# Patient Record
Sex: FEMALE | Race: BLACK OR AFRICAN AMERICAN | NOT HISPANIC OR LATINO | Employment: UNEMPLOYED | RURAL
[De-identification: names, ages, dates, MRNs, and addresses within clinical notes are randomized per-mention and may not be internally consistent; named-entity substitution may affect disease eponyms.]

---

## 2017-02-20 ENCOUNTER — OFFICE VISIT (OUTPATIENT)
Dept: RETAIL CLINIC | Facility: CLINIC | Age: 57
End: 2017-02-20

## 2017-02-20 VITALS
DIASTOLIC BLOOD PRESSURE: 72 MMHG | TEMPERATURE: 96.9 F | HEART RATE: 82 BPM | SYSTOLIC BLOOD PRESSURE: 130 MMHG | HEIGHT: 63 IN | OXYGEN SATURATION: 97 % | BODY MASS INDEX: 24.1 KG/M2 | WEIGHT: 136 LBS

## 2017-02-20 DIAGNOSIS — S46.911A RIGHT SHOULDER STRAIN, INITIAL ENCOUNTER: Primary | ICD-10-CM

## 2017-02-20 PROCEDURE — 99213 OFFICE O/P EST LOW 20 MIN: CPT | Performed by: NURSE PRACTITIONER

## 2017-02-20 PROCEDURE — 96372 THER/PROPH/DIAG INJ SC/IM: CPT | Performed by: NURSE PRACTITIONER

## 2017-02-20 RX ORDER — METHYLPREDNISOLONE ACETATE 40 MG/ML
80 INJECTION, SUSPENSION INTRA-ARTICULAR; INTRALESIONAL; INTRAMUSCULAR; SOFT TISSUE ONCE
Status: COMPLETED | OUTPATIENT
Start: 2017-02-20 | End: 2017-02-20

## 2017-02-20 RX ORDER — NAPROXEN SODIUM 220 MG
220 TABLET ORAL 2 TIMES DAILY PRN
COMMUNITY
End: 2017-07-10

## 2017-02-20 RX ORDER — CYCLOBENZAPRINE HCL 10 MG
10 TABLET ORAL 3 TIMES DAILY PRN
Qty: 30 TABLET | Refills: 0 | Status: SHIPPED | OUTPATIENT
Start: 2017-02-20 | End: 2017-07-10

## 2017-02-20 RX ADMIN — METHYLPREDNISOLONE ACETATE 80 MG: 40 INJECTION, SUSPENSION INTRA-ARTICULAR; INTRALESIONAL; INTRAMUSCULAR; SOFT TISSUE at 16:19

## 2017-02-20 NOTE — PATIENT INSTRUCTIONS
Muscle Strain  A muscle strain is an injury that occurs when a muscle is stretched beyond its normal length. Usually a small number of muscle fibers are torn when this happens. Muscle strain is rated in degrees. First-degree strains have the least amount of muscle fiber tearing and pain. Second-degree and third-degree strains have increasingly more tearing and pain.   Usually, recovery from muscle strain takes 1-2 weeks. Complete healing takes 5-6 weeks.   CAUSES   Muscle strain happens when a sudden, violent force placed on a muscle stretches it too far. This may occur with lifting, sports, or a fall.   RISK FACTORS  Muscle strain is especially common in athletes.   SIGNS AND SYMPTOMS  At the site of the muscle strain, there may be:  · Pain.  · Bruising.  · Swelling.  · Difficulty using the muscle due to pain or lack of normal function.  DIAGNOSIS   Your health care provider will perform a physical exam and ask about your medical history.  TREATMENT   Often, the best treatment for a muscle strain is resting, icing, and applying cold compresses to the injured area.    HOME CARE INSTRUCTIONS   · Use the PRICE method of treatment to promote muscle healing during the first 2-3 days after your injury. The PRICE method involves:    Protecting the muscle from being injured again.    Restricting your activity and resting the injured body part.    Icing your injury. To do this, put ice in a plastic bag. Place a towel between your skin and the bag. Then, apply the ice and leave it on from 15-20 minutes each hour. After the third day, switch to moist heat packs.    Apply compression to the injured area with a splint or elastic bandage. Be careful not to wrap it too tightly. This may interfere with blood circulation or increase swelling.    Elevate the injured body part above the level of your heart as often as you can.  · Only take over-the-counter or prescription medicines for pain, discomfort, or fever as directed by your  health care provider.  · Warming up prior to exercise helps to prevent future muscle strains.  SEEK MEDICAL CARE IF:   · You have increasing pain or swelling in the injured area.  · You have numbness, tingling, or a significant loss of strength in the injured area.  MAKE SURE YOU:   · Understand these instructions.  · Will watch your condition.  · Will get help right away if you are not doing well or get worse.     This information is not intended to replace advice given to you by your health care provider. Make sure you discuss any questions you have with your health care provider.     Document Released: 12/18/2006 Document Revised: 10/08/2014 Document Reviewed: 07/17/2014  [employer] Interactive Patient Education ©2016 [employer]    Shoulder Pain  Many things can cause shoulder pain, including:  · An injury to the area.  · Overuse of the shoulder.  · Arthritis.  The source of the pain can be:  · Inflammation.  · An injury to the shoulder joint.  · An injury to a tendon, ligament, or bone.  HOME CARE INSTRUCTIONS  Take these actions to help with your pain:  · Squeeze a soft ball or a foam pad as much as possible. This helps to keep the shoulder from swelling. It also helps to strengthen the arm.  · Take over-the-counter and prescription medicines only as told by your health care provider.  · If directed, apply ice to the area:    Put ice in a plastic bag.    Place a towel between your skin and the bag.    Leave the ice on for 20 minutes, 2-3 times per day. Stop applying ice if it does not help with the pain.  · If you were given a shoulder sling or immobilizer:    Wear it as told.    Remove it to shower or bathe.    Move your arm as little as possible, but keep your hand moving to prevent swelling.  SEEK MEDICAL CARE IF:  · Your pain gets worse.  · Your pain is not relieved with medicines.  · New pain develops in your arm, hand, or fingers.  SEEK IMMEDIATE MEDICAL CARE IF:  · Your arm, hand, or fingers:     Tingle.    Become numb.    Become swollen.    Become painful.    Turn white or blue.     This information is not intended to replace advice given to you by your health care provider. Make sure you discuss any questions you have with your health care provider.    Return to see your Primary Care Provider if not improved in 1 week.       Document Released: 09/27/2006 Document Revised: 11/28/2016 Document Reviewed: 04/11/2016  [employer] Interactive Patient Education ©2016 [employer] Inc.

## 2017-02-20 NOTE — PROGRESS NOTES
"Subjective   Jo Ann Lacy is a 56 y.o. female.     HPI Comments: Patient presents to clinic with Right shoulder pain, worsened by the repetitive motion she performs at work.  On questioning whether the patient had reported this to her employer, she says not yet \"because I've seen the way they treat the other employees around there when they go and report things like wrist pain.  A lot of them just get ignored. Or they put them on restrictions briefly but usually they end up doing their same job anyway.\"  Patient has been using aleve that helps a little.  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    Upper Extremity Issue   This is a new problem. The current episode started 1 to 4 weeks ago. The problem occurs constantly. The problem has been gradually worsening. Associated symptoms include chest pain, myalgias and weakness. Pertinent negatives include no joint swelling, neck pain or numbness. Associated symptoms comments: RUE weakness. Exacerbated by: Repetitive activity performed at work. She has tried NSAIDs (pain patches) for the symptoms. The treatment provided mild relief.        The following portions of the patient's history were reviewed and updated as appropriate: allergies, current medications, past family history, past medical history, past social history, past surgical history and problem list.    Review of Systems   Cardiovascular: Positive for chest pain.        Right upper anterior chest wall pain   Musculoskeletal: Positive for myalgias. Negative for joint swelling, neck pain and neck stiffness.   Neurological: Positive for weakness. Negative for numbness.        Extremity weakness      No Known Allergies      Objective      Visit Vitals   • /72 (BP Location: Left arm, Patient Position: Sitting, Cuff Size: Adult)   • Pulse 82   • Temp 96.9 °F (36.1 °C) (Tympanic)   • Ht 63\" (160 cm)   • Wt 136 lb (61.7 kg)   • SpO2 " 97%   • BMI 24.09 kg/m2       Physical Exam   Constitutional: She is oriented to person, place, and time. She appears well-developed.   Eyes: Conjunctivae and EOM are normal. Pupils are equal, round, and reactive to light.   Neck: Normal range of motion. Neck supple.   Cardiovascular: Normal rate, regular rhythm, normal heart sounds and intact distal pulses.  Exam reveals no gallop and no friction rub.    No murmur heard.  Pulmonary/Chest: Effort normal and breath sounds normal. No respiratory distress. She has no wheezes. She has no rales. She exhibits tenderness. She exhibits no mass, no laceration, no edema, no deformity, no swelling and no retraction.       Musculoskeletal:        Right shoulder: She exhibits tenderness, pain and decreased strength. She exhibits no effusion, no crepitus and normal pulse.   Pain elicited with most provocative testing for RTC   Neurological: She is alert and oriented to person, place, and time. She has normal reflexes. She displays no atrophy and no tremor. No cranial nerve deficit or sensory deficit. She exhibits normal muscle tone. Coordination normal.   RUE strength <LUE       Assessment/Plan   Jo Ann was seen today for shoulder pain.    Diagnoses and all orders for this visit:    Right shoulder strain, initial encounter  -     methylPREDNISolone acetate (DEPO-medrol) injection 80 mg; Inject 2 mL into the shoulder, thigh, or buttocks 1 (One) Time.    Other orders  -     diclofenac (VOLTAREN) 1 % gel gel; Apply 4 g topically 4 (Four) Times a Day As Needed (shoulder pain).  -     cyclobenzaprine (FLEXERIL) 10 MG tablet; Take 1 tablet by mouth 3 (Three) Times a Day As Needed for muscle spasms (DO NOT USE WHILE AT WORK).      SEEK IMMEDIATE MEDICAL CARE IF:  · Your arm, hand, or fingers:    Tingle.    Become numb.    Become swollen.    Become painful.    Turn white or blue.    Discussed the importance of further evaluation by a higher level of care.  Dulling pain with medications  can potentiate the risk for additional injury. I also encouraged her to further discuss her complaint with her employer.      RODNEY Oviedo

## 2017-06-15 RX ORDER — AMLODIPINE BESYLATE 5 MG/1
TABLET ORAL
Qty: 30 TABLET | Refills: 0 | OUTPATIENT
Start: 2017-06-15

## 2017-06-19 RX ORDER — AMLODIPINE BESYLATE 5 MG/1
TABLET ORAL
Qty: 30 TABLET | Refills: 0 | Status: SHIPPED | OUTPATIENT
Start: 2017-06-19 | End: 2017-07-10 | Stop reason: SDUPTHER

## 2017-07-10 ENCOUNTER — OFFICE VISIT (OUTPATIENT)
Dept: FAMILY MEDICINE CLINIC | Facility: CLINIC | Age: 57
End: 2017-07-10

## 2017-07-10 VITALS
WEIGHT: 130.9 LBS | RESPIRATION RATE: 20 BRPM | HEART RATE: 79 BPM | SYSTOLIC BLOOD PRESSURE: 128 MMHG | BODY MASS INDEX: 23.2 KG/M2 | TEMPERATURE: 98.7 F | HEIGHT: 63 IN | OXYGEN SATURATION: 97 % | DIASTOLIC BLOOD PRESSURE: 80 MMHG

## 2017-07-10 DIAGNOSIS — K63.5 BENIGN POLYP OF LARGE INTESTINE: Chronic | ICD-10-CM

## 2017-07-10 DIAGNOSIS — D37.4 VILLOUS ADENOMA OF COLON: Chronic | ICD-10-CM

## 2017-07-10 DIAGNOSIS — Z13.29 SCREENING FOR THYROID DISORDER: ICD-10-CM

## 2017-07-10 DIAGNOSIS — I10 ESSENTIAL HYPERTENSION: Primary | ICD-10-CM

## 2017-07-10 DIAGNOSIS — Z11.59 NEED FOR HEPATITIS C SCREENING TEST: ICD-10-CM

## 2017-07-10 DIAGNOSIS — Z13.220 SCREENING FOR LIPOID DISORDERS: ICD-10-CM

## 2017-07-10 DIAGNOSIS — D12.6 TUBULAR ADENOMA OF COLON: Chronic | ICD-10-CM

## 2017-07-10 DIAGNOSIS — Z12.31 ENCOUNTER FOR SCREENING MAMMOGRAM FOR BREAST CANCER: ICD-10-CM

## 2017-07-10 DIAGNOSIS — Z13.820 SCREENING FOR OSTEOPOROSIS: ICD-10-CM

## 2017-07-10 DIAGNOSIS — Z13.1 SCREENING FOR DIABETES MELLITUS: ICD-10-CM

## 2017-07-10 LAB
25(OH)D3 SERPL-MCNC: 33.9 NG/ML (ref 30–100)
ALBUMIN SERPL-MCNC: 4.2 G/DL (ref 3.4–4.8)
ALBUMIN UR-MCNC: 0.6 MG/L
ALBUMIN/GLOB SERPL: 1.4 G/DL (ref 1.1–1.8)
ALP SERPL-CCNC: 71 U/L (ref 38–126)
ALT SERPL W P-5'-P-CCNC: 29 U/L (ref 9–52)
ANION GAP SERPL CALCULATED.3IONS-SCNC: 10 MMOL/L (ref 5–15)
ARTICHOKE IGE QN: 80 MG/DL (ref 1–129)
AST SERPL-CCNC: 17 U/L (ref 14–36)
BILIRUB SERPL-MCNC: 0.2 MG/DL (ref 0.2–1.3)
BUN BLD-MCNC: 18 MG/DL (ref 7–21)
BUN/CREAT SERPL: 20.7 (ref 7–25)
CALCIUM SPEC-SCNC: 9.5 MG/DL (ref 8.4–10.2)
CHLORIDE SERPL-SCNC: 105 MMOL/L (ref 95–110)
CHOLEST SERPL-MCNC: 189 MG/DL (ref 0–199)
CO2 SERPL-SCNC: 24 MMOL/L (ref 22–31)
CREAT BLD-MCNC: 0.87 MG/DL (ref 0.5–1)
CREAT UR-MCNC: 59.5 MG/DL
DEPRECATED RDW RBC AUTO: 47.2 FL (ref 36.4–46.3)
ERYTHROCYTE [DISTWIDTH] IN BLOOD BY AUTOMATED COUNT: 13.3 % (ref 11.5–14.5)
GFR SERPL CREATININE-BSD FRML MDRD: 81 ML/MIN/1.73 (ref 51–120)
GLOBULIN UR ELPH-MCNC: 3 GM/DL (ref 2.3–3.5)
GLUCOSE BLD-MCNC: 91 MG/DL (ref 60–100)
HBA1C MFR BLD: 5.94 % (ref 4–5.6)
HCT VFR BLD AUTO: 37.4 % (ref 35–45)
HDLC SERPL-MCNC: 48 MG/DL (ref 60–200)
HGB BLD-MCNC: 12.3 G/DL (ref 12–15.5)
LDLC/HDLC SERPL: 1.52 {RATIO} (ref 0–3.22)
MCH RBC QN AUTO: 31.9 PG (ref 26.5–34)
MCHC RBC AUTO-ENTMCNC: 32.9 G/DL (ref 31.4–36)
MCV RBC AUTO: 96.9 FL (ref 80–98)
MICROALBUMIN/CREAT UR: 10.1 MG/G (ref 0–30)
PLATELET # BLD AUTO: 302 10*3/MM3 (ref 150–450)
PMV BLD AUTO: 10.1 FL (ref 8–12)
POTASSIUM BLD-SCNC: 4.3 MMOL/L (ref 3.5–5.1)
PROT SERPL-MCNC: 7.2 G/DL (ref 6.3–8.6)
RBC # BLD AUTO: 3.86 10*6/MM3 (ref 3.77–5.16)
SODIUM BLD-SCNC: 139 MMOL/L (ref 137–145)
TRIGL SERPL-MCNC: 340 MG/DL (ref 20–199)
TSH SERPL DL<=0.05 MIU/L-ACNC: 1.05 MIU/ML (ref 0.46–4.68)
WBC NRBC COR # BLD: 6.36 10*3/MM3 (ref 3.2–9.8)

## 2017-07-10 PROCEDURE — 80061 LIPID PANEL: CPT | Performed by: NURSE PRACTITIONER

## 2017-07-10 PROCEDURE — 82043 UR ALBUMIN QUANTITATIVE: CPT | Performed by: NURSE PRACTITIONER

## 2017-07-10 PROCEDURE — 80074 ACUTE HEPATITIS PANEL: CPT | Performed by: NURSE PRACTITIONER

## 2017-07-10 PROCEDURE — 99214 OFFICE O/P EST MOD 30 MIN: CPT | Performed by: NURSE PRACTITIONER

## 2017-07-10 PROCEDURE — 84443 ASSAY THYROID STIM HORMONE: CPT | Performed by: NURSE PRACTITIONER

## 2017-07-10 PROCEDURE — 82306 VITAMIN D 25 HYDROXY: CPT | Performed by: NURSE PRACTITIONER

## 2017-07-10 PROCEDURE — 83036 HEMOGLOBIN GLYCOSYLATED A1C: CPT | Performed by: NURSE PRACTITIONER

## 2017-07-10 PROCEDURE — 80053 COMPREHEN METABOLIC PANEL: CPT | Performed by: NURSE PRACTITIONER

## 2017-07-10 PROCEDURE — 82570 ASSAY OF URINE CREATININE: CPT | Performed by: NURSE PRACTITIONER

## 2017-07-10 PROCEDURE — 85027 COMPLETE CBC AUTOMATED: CPT | Performed by: NURSE PRACTITIONER

## 2017-07-10 RX ORDER — AMLODIPINE BESYLATE 5 MG/1
5 TABLET ORAL DAILY
Qty: 90 TABLET | Refills: 3 | Status: SHIPPED | OUTPATIENT
Start: 2017-07-10 | End: 2017-08-04 | Stop reason: SDUPTHER

## 2017-07-10 NOTE — PATIENT INSTRUCTIONS
Preventive Care 40-64 Years, Female  Preventive care refers to lifestyle choices and visits with your health care provider that can promote health and wellness.  WHAT DOES PREVENTIVE CARE INCLUDE?  · A yearly physical exam. This is also called an annual well check.  · Dental exams once or twice a year.  · Routine eye exams. Ask your health care provider how often you should have your eyes checked.  · Personal lifestyle choices, including:    Daily care of your teeth and gums.    Regular physical activity.    Eating a healthy diet.    Avoiding tobacco and drug use.    Limiting alcohol use.    Practicing safe sex.    Taking low-dose aspirin daily starting at age 50.    Taking vitamin and mineral supplements as recommended by your health care provider.  WHAT HAPPENS DURING AN ANNUAL WELL CHECK?  The services and screenings done by your health care provider during your annual well check will depend on your age, overall health, lifestyle risk factors, and family history of disease.  Counseling  Your health care provider may ask you questions about your:  · Alcohol use.  · Tobacco use.  · Drug use.  · Emotional well-being.  · Home and relationship well-being.  · Sexual activity.  · Eating habits.  · Work and work environment.  · Method of birth control.  · Menstrual cycle.  · Pregnancy history.  Screening  You may have the following tests or measurements:  · Height, weight, and BMI.  · Blood pressure.  · Lipid and cholesterol levels. These may be checked every 5 years, or more frequently if you are over 50 years old.  · Skin check.  · Lung cancer screening. You may have this screening every year starting at age 55 if you have a 30-pack-year history of smoking and currently smoke or have quit within the past 15 years.  · Fecal occult blood test (FOBT) of the stool. You may have this test every year starting at age 50.  · Flexible sigmoidoscopy or colonoscopy. You may have a sigmoidoscopy every 5 years or a colonoscopy  every 10 years starting at age 50.  · Hepatitis C blood test.  · Hepatitis B blood test.  · Sexually transmitted disease (STD) testing.  · Diabetes screening. This is done by checking your blood sugar (glucose) after you have not eaten for a while (fasting). You may have this done every 1-3 years.  · Mammogram. This may be done every 1-2 years. Talk to your health care provider about when you should start having regular mammograms. This may depend on whether you have a family history of breast cancer.  · BRCA-related cancer screening. This may be done if you have a family history of breast, ovarian, tubal, or peritoneal cancers.  · Pelvic exam and Pap test. This may be done every 3 years starting at age 21. Starting at age 30, this may be done every 5 years if you have a Pap test in combination with an HPV test.  · Bone density scan. This is done to screen for osteoporosis. You may have this scan if you are at high risk for osteoporosis.  Discuss your test results, treatment options, and if necessary, the need for more tests with your health care provider.  Vaccines   Your health care provider may recommend certain vaccines, such as:  · Influenza vaccine. This is recommended every year.  · Tetanus, diphtheria, and acellular pertussis (Tdap, Td) vaccine. You may need a Td booster every 10 years.  · Varicella vaccine. You may need this if you have not been vaccinated.  · Zoster vaccine. You may need this after age 60.  · Measles, mumps, and rubella (MMR) vaccine. You may need at least one dose of MMR if you were born in 1957 or later. You may also need a second dose.  · Pneumococcal 13-valent conjugate (PCV13) vaccine. You may need this if you have certain conditions and were not previously vaccinated.  · Pneumococcal polysaccharide (PPSV23) vaccine. You may need one or two doses if you smoke cigarettes or if you have certain conditions.  · Meningococcal vaccine. You may need this if you have certain  conditions.  · Hepatitis A vaccine. You may need this if you have certain conditions or if you travel or work in places where you may be exposed to hepatitis A.  · Hepatitis B vaccine. You may need this if you have certain conditions or if you travel or work in places where you may be exposed to hepatitis B.  · Haemophilus influenzae type b (Hib) vaccine. You may need this if you have certain conditions.  Talk to your health care provider about which screenings and vaccines you need and how often you need them.     This information is not intended to replace advice given to you by your health care provider. Make sure you discuss any questions you have with your health care provider.     Document Released: 01/13/2017 Document Reviewed: 01/13/2017  [employer] Interactive Patient Education ©2017 [employer] Inc.    Hypertension  Hypertension is another name for high blood pressure. High blood pressure forces your heart to work harder to pump blood. A blood pressure reading has two numbers, which includes a higher number over a lower number (example: 110/72).  HOME CARE   · Have your blood pressure rechecked by your doctor.  · Only take medicine as told by your doctor. Follow the directions carefully. The medicine does not work as well if you skip doses. Skipping doses also puts you at risk for problems.  · Do not smoke.  · Monitor your blood pressure at home as told by your doctor.  GET HELP IF:  · You think you are having a reaction to the medicine you are taking.  · You have repeat headaches or feel dizzy.  · You have puffiness (swelling) in your ankles.  · You have trouble with your vision.  GET HELP RIGHT AWAY IF:   · You get a very bad headache and are confused.  · You feel weak, numb, or faint.  · You get chest or belly (abdominal) pain.  · You throw up (vomit).  · You cannot breathe very well.  MAKE SURE YOU:   · Understand these instructions.  · Will watch your condition.  · Will get help right away if you are not  doing well or get worse.     This information is not intended to replace advice given to you by your health care provider. Make sure you discuss any questions you have with your health care provider.     Document Released: 06/05/2009 Document Revised: 12/23/2014 Document Reviewed: 10/10/2014  [employer] Interactive Patient Education ©2017 [employer]    Steps to Quit Smoking   Smoking tobacco can be harmful to your health and can affect almost every organ in your body. Smoking puts you, and those around you, at risk for developing many serious chronic diseases. Quitting smoking is difficult, but it is one of the best things that you can do for your health. It is never too late to quit.  WHAT ARE THE BENEFITS OF QUITTING SMOKING?  When you quit smoking, you lower your risk of developing serious diseases and conditions, such as:  · Lung cancer or lung disease, such as COPD.  · Heart disease.  · Stroke.  · Heart attack.  · Infertility.  · Osteoporosis and bone fractures.  Additionally, symptoms such as coughing, wheezing, and shortness of breath may get better when you quit. You may also find that you get sick less often because your body is stronger at fighting off colds and infections. If you are pregnant, quitting smoking can help to reduce your chances of having a baby of low birth weight.  HOW DO I GET READY TO QUIT?  When you decide to quit smoking, create a plan to make sure that you are successful. Before you quit:  · Pick a date to quit. Set a date within the next two weeks to give you time to prepare.  · Write down the reasons why you are quitting. Keep this list in places where you will see it often, such as on your bathroom mirror or in your car or wallet.  · Identify the people, places, things, and activities that make you want to smoke (triggers) and avoid them. Make sure to take these actions:    Throw away all cigarettes at home, at work, and in your car.    Throw away smoking accessories, such as  "ashtrays and lighters.    Clean your car and make sure to empty the ashtray.    Clean your home, including curtains and carpets.  · Tell your family, friends, and coworkers that you are quitting. Support from your loved ones can make quitting easier.  · Talk with your health care provider about your options for quitting smoking.  · Find out what treatment options are covered by your health insurance.  WHAT STRATEGIES CAN I USE TO QUIT SMOKING?   Talk with your healthcare provider about different strategies to quit smoking. Some strategies include:  · Quitting smoking altogether instead of gradually lessening how much you smoke over a period of time. Research shows that quitting \"cold turkey\" is more successful than gradually quitting.  · Attending in-person counseling to help you build problem-solving skills. You are more likely to have success in quitting if you attend several counseling sessions. Even short sessions of 10 minutes can be effective.  · Finding resources and support systems that can help you to quit smoking and remain smoke-free after you quit. These resources are most helpful when you use them often. They can include:    Online chats with a counselor.    Telephone quitlines.    Printed self-help materials.    Support groups or group counseling.    Text messaging programs.    Mobile phone applications.  · Taking medicines to help you quit smoking. (If you are pregnant or breastfeeding, talk with your health care provider first.) Some medicines contain nicotine and some do not. Both types of medicines help with cravings, but the medicines that include nicotine help to relieve withdrawal symptoms. Your health care provider may recommend:    Nicotine patches, gum, or lozenges.    Nicotine inhalers or sprays.    Non-nicotine medicine that is taken by mouth.  Talk with your health care provider about combining strategies, such as taking medicines while you are also receiving in-person counseling. Using " these two strategies together makes you more likely to succeed in quitting than if you used either strategy on its own.  If you are pregnant or breastfeeding, talk with your health care provider about finding counseling or other support strategies to quit smoking. Do not take medicine to help you quit smoking unless told to do so by your health care provider.  WHAT THINGS CAN I DO TO MAKE IT EASIER TO QUIT?  Quitting smoking might feel overwhelming at first, but there is a lot that you can do to make it easier. Take these important actions:  · Reach out to your family and friends and ask that they support and encourage you during this time. Call telephone quitlines, reach out to support groups, or work with a counselor for support.  · Ask people who smoke to avoid smoking around you.  · Avoid places that trigger you to smoke, such as bars, parties, or smoke-break areas at work.  · Spend time around people who do not smoke.  · Lessen stress in your life, because stress can be a smoking trigger for some people. To lessen stress, try:    Exercising regularly.    Deep-breathing exercises.    Yoga.    Meditating.    Performing a body scan. This involves closing your eyes, scanning your body from head to toe, and noticing which parts of your body are particularly tense. Purposefully relax the muscles in those areas.  · Download or purchase mobile phone or tablet apps (applications) that can help you stick to your quit plan by providing reminders, tips, and encouragement. There are many free apps, such as QuitGuide from the CDC (Centers for Disease Control and Prevention). You can find other support for quitting smoking (smoking cessation) through smokefree.gov and other websites.  HOW WILL I FEEL WHEN I QUIT SMOKING?  Within the first 24 hours of quitting smoking, you may start to feel some withdrawal symptoms. These symptoms are usually most noticeable 2-3 days after quitting, but they usually do not last beyond 2-3  weeks. Changes or symptoms that you might experience include:  · Mood swings.  · Restlessness, anxiety, or irritation.  · Difficulty concentrating.  · Dizziness.  · Strong cravings for sugary foods in addition to nicotine.  · Mild weight gain.  · Constipation.  · Nausea.  · Coughing or a sore throat.  · Changes in how your medicines work in your body.  · A depressed mood.  · Difficulty sleeping (insomnia).  After the first 2-3 weeks of quitting, you may start to notice more positive results, such as:  · Improved sense of smell and taste.  · Decreased coughing and sore throat.  · Slower heart rate.  · Lower blood pressure.  · Clearer skin.  · The ability to breathe more easily.  · Fewer sick days.  Quitting smoking is very challenging for most people. Do not get discouraged if you are not successful the first time. Some people need to make many attempts to quit before they achieve long-term success. Do your best to stick to your quit plan, and talk with your health care provider if you have any questions or concerns.     This information is not intended to replace advice given to you by your health care provider. Make sure you discuss any questions you have with your health care provider.     Document Released: 12/12/2002 Document Revised: 05/03/2016 Document Reviewed: 05/03/2016  [employer] Interactive Patient Education ©2017 [employer]

## 2017-07-10 NOTE — PROGRESS NOTES
Subjective   Jo Ann Lacy is a 56 y.o. female. She presents today for her routine follow up on chronic medical problems.  She has been doing fairly well since her last visit with me.  Hx of HTN that is well controlled on Norvasc.  She also is due for fasting labs and a repeat colonoscopy.  Previously saw Dr. Muñiz and Vaibhav Olsen, but does not have transportation to travel to [place]   She is also due for her screening mammogram and DXA scan.  Has had a total hysterectomy.    Hypertension   This is a chronic problem. The current episode started more than 1 year ago. The problem has been resolved since onset. The problem is controlled. Pertinent negatives include no anxiety, blurred vision, chest pain, headaches, malaise/fatigue, neck pain, orthopnea, palpitations, peripheral edema, PND, shortness of breath or sweats. There are no associated agents to hypertension. Risk factors for coronary artery disease include post-menopausal state, family history, sedentary lifestyle, smoking/tobacco exposure and stress. Past treatments include calcium channel blockers. The current treatment provides significant improvement. Compliance problems include diet and exercise.         The following portions of the patient's history were reviewed and updated as appropriate: allergies, current medications, past family history, past medical history, past social history, past surgical history and problem list.    Review of Systems   Constitutional: Negative.  Negative for malaise/fatigue.   Eyes: Negative for blurred vision.   Respiratory: Negative.  Negative for shortness of breath.    Cardiovascular: Negative.  Negative for chest pain, palpitations, orthopnea and PND.   Musculoskeletal: Negative for neck pain.   Skin: Negative.    Neurological: Negative for headaches.       Objective   Physical Exam   Constitutional: She is oriented to person, place, and time. Vital signs are normal. She appears well-developed and  well-nourished. No distress.   HENT:   Head: Normocephalic.   Right Ear: External ear normal.   Left Ear: External ear normal.   Nose: Nose normal.   Mouth/Throat: Oropharynx is clear and moist.   Eyes: EOM are normal. Pupils are equal, round, and reactive to light.   Neck: Normal range of motion. Neck supple. No JVD present. No thyromegaly present.   Cardiovascular: Normal rate and regular rhythm.  Exam reveals no gallop and no friction rub.    No murmur heard.  Pulmonary/Chest: Effort normal and breath sounds normal. No respiratory distress. She has no wheezes. She has no rales.   Musculoskeletal: Normal range of motion.   Neurological: She is alert and oriented to person, place, and time.   Skin: Skin is warm and dry. No rash noted. She is not diaphoretic. No erythema. No pallor.   Psychiatric: She has a normal mood and affect. Her behavior is normal. Judgment and thought content normal.   Nursing note and vitals reviewed.      Assessment/Plan   Jo Ann was seen today for follow-up and hypertension.    Diagnoses and all orders for this visit:    Essential hypertension  -     amLODIPine (NORVASC) 5 MG tablet; Take 1 tablet by mouth Daily.  -     CBC (No Diff)  -     Comprehensive Metabolic Panel  -     Microalbumin / Creatinine Urine Ratio  -     Vitamin D 25 Hydroxy    Screening for diabetes mellitus  -     Hemoglobin A1c    Screening for lipoid disorders  -     Lipid Panel    Screening for thyroid disorder  -     TSH    Encounter for screening mammogram for breast cancer  -     Mammo Screening Bilateral With CAD    Screening for osteoporosis  -     DEXA Bone Density Axial    Need for hepatitis C screening test  -     Hepatitis Panel, Acute    Fasting labs.   Schedule screening mammo and DXA.  Referral to Dr. Brewer at patient's request for repeat colonoscopy and GI care due to lack of transportation.   Continue current medications.  Follow up in 3 months for routine follow up.  Follow up sooner for  problems/concerns.  Patient verbalized understanding and agreement with plan of care.        This document has been electronically signed by RODNEY David on July 10, 2017 12:19 PM

## 2017-07-11 DIAGNOSIS — E78.1 HYPERTRIGLYCERIDEMIA: Primary | ICD-10-CM

## 2017-07-11 RX ORDER — FENOFIBRATE 160 MG/1
160 TABLET ORAL DAILY
Qty: 90 TABLET | Refills: 1 | Status: SHIPPED | OUTPATIENT
Start: 2017-07-11 | End: 2018-02-14 | Stop reason: SDUPTHER

## 2017-07-12 LAB
HAV IGM SERPL QL IA: NEGATIVE
HBV CORE IGM SERPL QL IA: NEGATIVE
HBV SURFACE AG SERPL QL IA: NEGATIVE
HCV AB SER DONR QL: NEGATIVE

## 2017-08-04 DIAGNOSIS — I10 ESSENTIAL HYPERTENSION: ICD-10-CM

## 2017-08-04 RX ORDER — AMLODIPINE BESYLATE 5 MG/1
5 TABLET ORAL DAILY
Qty: 90 TABLET | Refills: 3 | Status: SHIPPED | OUTPATIENT
Start: 2017-08-04 | End: 2018-02-14 | Stop reason: SDUPTHER

## 2018-02-14 ENCOUNTER — OFFICE VISIT (OUTPATIENT)
Dept: FAMILY MEDICINE CLINIC | Facility: CLINIC | Age: 58
End: 2018-02-14

## 2018-02-14 VITALS
RESPIRATION RATE: 20 BRPM | HEIGHT: 63 IN | OXYGEN SATURATION: 97 % | DIASTOLIC BLOOD PRESSURE: 80 MMHG | HEART RATE: 94 BPM | BODY MASS INDEX: 24.73 KG/M2 | TEMPERATURE: 98.4 F | SYSTOLIC BLOOD PRESSURE: 118 MMHG | WEIGHT: 139.6 LBS

## 2018-02-14 DIAGNOSIS — Z13.820 SCREENING FOR OSTEOPOROSIS: ICD-10-CM

## 2018-02-14 DIAGNOSIS — K63.5 BENIGN POLYP OF LARGE INTESTINE: Chronic | ICD-10-CM

## 2018-02-14 DIAGNOSIS — D37.4 VILLOUS ADENOMA OF COLON: Chronic | ICD-10-CM

## 2018-02-14 DIAGNOSIS — I10 ESSENTIAL HYPERTENSION: Primary | Chronic | ICD-10-CM

## 2018-02-14 DIAGNOSIS — D12.6 TUBULAR ADENOMA OF COLON: Chronic | ICD-10-CM

## 2018-02-14 DIAGNOSIS — E55.9 VITAMIN D DEFICIENCY: Chronic | ICD-10-CM

## 2018-02-14 DIAGNOSIS — Z12.31 ENCOUNTER FOR SCREENING MAMMOGRAM FOR BREAST CANCER: ICD-10-CM

## 2018-02-14 DIAGNOSIS — Z13.1 SCREENING FOR DIABETES MELLITUS: ICD-10-CM

## 2018-02-14 DIAGNOSIS — E78.1 HYPERTRIGLYCERIDEMIA: Chronic | ICD-10-CM

## 2018-02-14 DIAGNOSIS — Z13.29 SCREENING FOR THYROID DISORDER: ICD-10-CM

## 2018-02-14 PROCEDURE — 99213 OFFICE O/P EST LOW 20 MIN: CPT | Performed by: NURSE PRACTITIONER

## 2018-02-14 RX ORDER — FENOFIBRATE 160 MG/1
160 TABLET ORAL DAILY
Qty: 90 TABLET | Refills: 3 | Status: SHIPPED | OUTPATIENT
Start: 2018-02-14 | End: 2018-08-14 | Stop reason: SDDI

## 2018-02-14 RX ORDER — INFLUENZA A VIRUS A/MICHIGAN/45/2015 X-275 (H1N1) ANTIGEN (FORMALDEHYDE INACTIVATED), INFLUENZA A VIRUS A/SINGAPORE/INFIMH-16-0019/2016 IVR-186 (H3N2) ANTIGEN (FORMALDEHYDE INACTIVATED), INFLUENZA B VIRUS B/PHUKET/3073/2013 ANTIGEN (FORMALDEHYDE INACTIVATED), AND INFLUENZA B VIRUS B/MARYLAND/15/2016 BX-69A ANTIGEN (FORMALDEHYDE INACTIVATED) 15; 15; 15; 15 UG/.5ML; UG/.5ML; UG/.5ML; UG/.5ML
INJECTION, SUSPENSION INTRAMUSCULAR
COMMUNITY
Start: 2017-12-30 | End: 2018-02-14

## 2018-02-14 RX ORDER — AMLODIPINE BESYLATE 5 MG/1
5 TABLET ORAL DAILY
Qty: 90 TABLET | Refills: 3 | Status: SHIPPED | OUTPATIENT
Start: 2018-02-14 | End: 2018-08-14 | Stop reason: SDUPTHER

## 2018-02-14 RX ORDER — ISONIAZID 300 MG/1
TABLET ORAL
COMMUNITY
Start: 2018-01-17 | End: 2018-02-14

## 2018-02-14 NOTE — PROGRESS NOTES
Subjective   Jo Ann Lacy is a 57 y.o. female. She presents today for her routine follow up on chronic medical problems.  She is due for fasting labs, mammogram, and DXA scan.  No new complaints today in the office.    Hypertension   This is a chronic problem. The current episode started more than 1 year ago. The problem has been resolved since onset. The problem is controlled. Pertinent negatives include no anxiety, blurred vision, chest pain, headaches, malaise/fatigue, neck pain, orthopnea, palpitations, peripheral edema, PND, shortness of breath or sweats. There are no associated agents to hypertension. Risk factors for coronary artery disease include post-menopausal state, dyslipidemia, family history, sedentary lifestyle, smoking/tobacco exposure and stress. Past treatments include calcium channel blockers. The current treatment provides significant improvement. Compliance problems include diet and exercise.         The following portions of the patient's history were reviewed and updated as appropriate: allergies, current medications, past family history, past medical history, past social history, past surgical history and problem list.    Review of Systems   Constitutional: Negative.  Negative for malaise/fatigue.   Eyes: Negative for blurred vision.   Respiratory: Negative.  Negative for shortness of breath.    Cardiovascular: Negative.  Negative for chest pain, palpitations, orthopnea and PND.   Musculoskeletal: Negative for neck pain.   Skin: Negative.    Neurological: Negative for headaches.       Objective   Physical Exam   Constitutional: She is oriented to person, place, and time. Vital signs are normal. She appears well-developed and well-nourished. No distress.   HENT:   Head: Normocephalic.   Right Ear: External ear normal.   Left Ear: External ear normal.   Nose: Nose normal.   Mouth/Throat: Oropharynx is clear and moist.   Eyes: EOM are normal. Pupils are equal, round, and reactive to light.    Neck: Normal range of motion. Neck supple. No JVD present. No thyromegaly present.   Cardiovascular: Normal rate and regular rhythm.  Exam reveals no gallop and no friction rub.    No murmur heard.  Pulmonary/Chest: Effort normal and breath sounds normal. No respiratory distress. She has no wheezes. She has no rales.   Musculoskeletal: Normal range of motion.   Neurological: She is alert and oriented to person, place, and time.   Skin: Skin is warm and dry. No rash noted. She is not diaphoretic. No erythema. No pallor.   Psychiatric: She has a normal mood and affect. Her behavior is normal. Judgment and thought content normal.   Nursing note and vitals reviewed.      Assessment/Plan   Jo Ann was seen today for follow-up and hypertension.    Diagnoses and all orders for this visit:    Essential hypertension  -     CBC (No Diff)  -     Comprehensive Metabolic Panel  -     Microalbumin / Creatinine Urine Ratio - Urine, Clean Catch  -     amLODIPine (NORVASC) 5 MG tablet; Take 1 tablet by mouth Daily.    Hypertriglyceridemia  -     Lipid Panel  -     fenofibrate 160 MG tablet; Take 1 tablet by mouth Daily.    Vitamin D deficiency  -     Vitamin D 25 Hydroxy    Encounter for screening mammogram for breast cancer  -     Mammo Screening Bilateral With CAD    Screening for osteoporosis  -     DEXA Bone Density Axial    Screening for diabetes mellitus  -     Hemoglobin A1c    Screening for thyroid disorder  -     TSH    Fasting labs.  Schedule for mammo and DXA scan.  Continue all other current medications.  Follow up in 6 months for routine follow up.  Follow up sooner for problems/concerns.  Patient verbalized understanding and agreement with plan of care.        This document has been electronically signed by RODNEY David on February 14, 2018 8:47 AM

## 2018-02-14 NOTE — PATIENT INSTRUCTIONS
"Hypertension  Hypertension, commonly called high blood pressure, is when the force of blood pumping through the arteries is too strong. The arteries are the blood vessels that carry blood from the heart throughout the body. Hypertension forces the heart to work harder to pump blood and may cause arteries to become narrow or stiff. Having untreated or uncontrolled hypertension can cause heart attacks, strokes, kidney disease, and other problems.  A blood pressure reading consists of a higher number over a lower number. Ideally, your blood pressure should be below 120/80. The first (\"top\") number is called the systolic pressure. It is a measure of the pressure in your arteries as your heart beats. The second (\"bottom\") number is called the diastolic pressure. It is a measure of the pressure in your arteries as the heart relaxes.  What are the causes?  The cause of this condition is not known.  What increases the risk?  Some risk factors for high blood pressure are under your control. Others are not.  Factors you can change   · Smoking.  · Having type 2 diabetes mellitus, high cholesterol, or both.  · Not getting enough exercise or physical activity.  · Being overweight.  · Having too much fat, sugar, calories, or salt (sodium) in your diet.  · Drinking too much alcohol.  Factors that are difficult or impossible to change   · Having chronic kidney disease.  · Having a family history of high blood pressure.  · Age. Risk increases with age.  · Race. You may be at higher risk if you are -American.  · Gender. Men are at higher risk than women before age 45. After age 65, women are at higher risk than men.  · Having obstructive sleep apnea.  · Stress.  What are the signs or symptoms?  Extremely high blood pressure (hypertensive crisis) may cause:  · Headache.  · Anxiety.  · Shortness of breath.  · Nosebleed.  · Nausea and vomiting.  · Severe chest pain.  · Jerky movements you cannot control (seizures).  How is this " diagnosed?  This condition is diagnosed by measuring your blood pressure while you are seated, with your arm resting on a surface. The cuff of the blood pressure monitor will be placed directly against the skin of your upper arm at the level of your heart. It should be measured at least twice using the same arm. Certain conditions can cause a difference in blood pressure between your right and left arms.  Certain factors can cause blood pressure readings to be lower or higher than normal (elevated) for a short period of time:  · When your blood pressure is higher when you are in a health care provider's office than when you are at home, this is called white coat hypertension. Most people with this condition do not need medicines.  · When your blood pressure is higher at home than when you are in a health care provider's office, this is called masked hypertension. Most people with this condition may need medicines to control blood pressure.  If you have a high blood pressure reading during one visit or you have normal blood pressure with other risk factors:  · You may be asked to return on a different day to have your blood pressure checked again.  · You may be asked to monitor your blood pressure at home for 1 week or longer.  If you are diagnosed with hypertension, you may have other blood or imaging tests to help your health care provider understand your overall risk for other conditions.  How is this treated?  This condition is treated by making healthy lifestyle changes, such as eating healthy foods, exercising more, and reducing your alcohol intake. Your health care provider may prescribe medicine if lifestyle changes are not enough to get your blood pressure under control, and if:  · Your systolic blood pressure is above 130.  · Your diastolic blood pressure is above 80.  Your personal target blood pressure may vary depending on your medical conditions, your age, and other factors.  Follow these instructions  at home:  Eating and drinking   · Eat a diet that is high in fiber and potassium, and low in sodium, added sugar, and fat. An example eating plan is called the DASH (Dietary Approaches to Stop Hypertension) diet. To eat this way:  ¨ Eat plenty of fresh fruits and vegetables. Try to fill half of your plate at each meal with fruits and vegetables.  ¨ Eat whole grains, such as whole wheat pasta, brown rice, or whole grain bread. Fill about one quarter of your plate with whole grains.  ¨ Eat or drink low-fat dairy products, such as skim milk or low-fat yogurt.  ¨ Avoid fatty cuts of meat, processed or cured meats, and poultry with skin. Fill about one quarter of your plate with lean proteins, such as fish, chicken without skin, beans, eggs, and tofu.  ¨ Avoid premade and processed foods. These tend to be higher in sodium, added sugar, and fat.  · Reduce your daily sodium intake. Most people with hypertension should eat less than 1,500 mg of sodium a day.  · Limit alcohol intake to no more than 1 drink a day for nonpregnant women and 2 drinks a day for men. One drink equals 12 oz of beer, 5 oz of wine, or 1½ oz of hard liquor.  Lifestyle   · Work with your health care provider to maintain a healthy body weight or to lose weight. Ask what an ideal weight is for you.  · Get at least 30 minutes of exercise that causes your heart to beat faster (aerobic exercise) most days of the week. Activities may include walking, swimming, or biking.  · Include exercise to strengthen your muscles (resistance exercise), such as pilates or lifting weights, as part of your weekly exercise routine. Try to do these types of exercises for 30 minutes at least 3 days a week.  · Do not use any products that contain nicotine or tobacco, such as cigarettes and e-cigarettes. If you need help quitting, ask your health care provider.  · Monitor your blood pressure at home as told by your health care provider.  · Keep all follow-up visits as told by  "your health care provider. This is important.  Medicines   · Take over-the-counter and prescription medicines only as told by your health care provider. Follow directions carefully. Blood pressure medicines must be taken as prescribed.  · Do not skip doses of blood pressure medicine. Doing this puts you at risk for problems and can make the medicine less effective.  · Ask your health care provider about side effects or reactions to medicines that you should watch for.  Contact a health care provider if:  · You think you are having a reaction to a medicine you are taking.  · You have headaches that keep coming back (recurring).  · You feel dizzy.  · You have swelling in your ankles.  · You have trouble with your vision.  Get help right away if:  · You develop a severe headache or confusion.  · You have unusual weakness or numbness.  · You feel faint.  · You have severe pain in your chest or abdomen.  · You vomit repeatedly.  · You have trouble breathing.  Summary  · Hypertension is when the force of blood pumping through your arteries is too strong. If this condition is not controlled, it may put you at risk for serious complications.  · Your personal target blood pressure may vary depending on your medical conditions, your age, and other factors. For most people, a normal blood pressure is less than 120/80.  · Hypertension is treated with lifestyle changes, medicines, or a combination of both. Lifestyle changes include weight loss, eating a healthy, low-sodium diet, exercising more, and limiting alcohol.  This information is not intended to replace advice given to you by your health care provider. Make sure you discuss any questions you have with your health care provider.  Document Released: 12/18/2006 Document Revised: 11/15/2017 Document Reviewed: 11/15/2017  [employer] Interactive Patient Education © 2017 [employer]    DASH Eating Plan  DASH stands for \"Dietary Approaches to Stop Hypertension.\" The DASH eating " "plan is a healthy eating plan that has been shown to reduce high blood pressure (hypertension). It may also reduce your risk for type 2 diabetes, heart disease, and stroke. The DASH eating plan may also help with weight loss.  What are tips for following this plan?  General guidelines   · Avoid eating more than 2,300 mg (milligrams) of salt (sodium) a day. If you have hypertension, you may need to reduce your sodium intake to 1,500 mg a day.  · Limit alcohol intake to no more than 1 drink a day for nonpregnant women and 2 drinks a day for men. One drink equals 12 oz of beer, 5 oz of wine, or 1½ oz of hard liquor.  · Work with your health care provider to maintain a healthy body weight or to lose weight. Ask what an ideal weight is for you.  · Get at least 30 minutes of exercise that causes your heart to beat faster (aerobic exercise) most days of the week. Activities may include walking, swimming, or biking.  · Work with your health care provider or diet and nutrition specialist (dietitian) to adjust your eating plan to your individual calorie needs.  Reading food labels   · Check food labels for the amount of sodium per serving. Choose foods with less than 5 percent of the Daily Value of sodium. Generally, foods with less than 300 mg of sodium per serving fit into this eating plan.  · To find whole grains, look for the word \"whole\" as the first word in the ingredient list.  Shopping   · Buy products labeled as \"low-sodium\" or \"no salt added.\"  · Buy fresh foods. Avoid canned foods and premade or frozen meals.  Cooking   · Avoid adding salt when cooking. Use salt-free seasonings or herbs instead of table salt or sea salt. Check with your health care provider or pharmacist before using salt substitutes.  · Do not rawls foods. Cook foods using healthy methods such as baking, boiling, grilling, and broiling instead.  · Cook with heart-healthy oils, such as olive, canola, soybean, or sunflower oil.  Meal planning "     · Eat a balanced diet that includes:  ¨ 5 or more servings of fruits and vegetables each day. At each meal, try to fill half of your plate with fruits and vegetables.  ¨ Up to 6-8 servings of whole grains each day.  ¨ Less than 6 oz of lean meat, poultry, or fish each day. A 3-oz serving of meat is about the same size as a deck of cards. One egg equals 1 oz.  ¨ 2 servings of low-fat dairy each day.  ¨ A serving of nuts, seeds, or beans 5 times each week.  ¨ Heart-healthy fats. Healthy fats called Omega-3 fatty acids are found in foods such as flaxseeds and coldwater fish, like sardines, salmon, and mackerel.  · Limit how much you eat of the following:  ¨ Canned or prepackaged foods.  ¨ Food that is high in trans fat, such as fried foods.  ¨ Food that is high in saturated fat, such as fatty meat.  ¨ Sweets, desserts, sugary drinks, and other foods with added sugar.  ¨ Full-fat dairy products.  · Do not salt foods before eating.  · Try to eat at least 2 vegetarian meals each week.  · Eat more home-cooked food and less restaurant, buffet, and fast food.  · When eating at a restaurant, ask that your food be prepared with less salt or no salt, if possible.  What foods are recommended?  The items listed may not be a complete list. Talk with your dietitian about what dietary choices are best for you.  Grains   Whole-grain or whole-wheat bread. Whole-grain or whole-wheat pasta. Brown rice. Oatmeal. Quinoa. Bulgur. Whole-grain and low-sodium cereals. Lea bread. Low-fat, low-sodium crackers. Whole-wheat flour tortillas.  Vegetables   Fresh or frozen vegetables (raw, steamed, roasted, or grilled). Low-sodium or reduced-sodium tomato and vegetable juice. Low-sodium or reduced-sodium tomato sauce and tomato paste. Low-sodium or reduced-sodium canned vegetables.  Fruits   All fresh, dried, or frozen fruit. Canned fruit in natural juice (without added sugar).  Meat and other protein foods   Skinless chicken or turkey. Ground  chicken or turkey. Pork with fat trimmed off. Fish and seafood. Egg whites. Dried beans, peas, or lentils. Unsalted nuts, nut butters, and seeds. Unsalted canned beans. Lean cuts of beef with fat trimmed off. Low-sodium, lean deli meat.  Dairy   Low-fat (1%) or fat-free (skim) milk. Fat-free, low-fat, or reduced-fat cheeses. Nonfat, low-sodium ricotta or cottage cheese. Low-fat or nonfat yogurt. Low-fat, low-sodium cheese.  Fats and oils   Soft margarine without trans fats. Vegetable oil. Low-fat, reduced-fat, or light mayonnaise and salad dressings (reduced-sodium). Canola, safflower, olive, soybean, and sunflower oils. Avocado.  Seasoning and other foods   Herbs. Spices. Seasoning mixes without salt. Unsalted popcorn and pretzels. Fat-free sweets.  What foods are not recommended?  The items listed may not be a complete list. Talk with your dietitian about what dietary choices are best for you.  Grains   Baked goods made with fat, such as croissants, muffins, or some breads. Dry pasta or rice meal packs.  Vegetables   Creamed or fried vegetables. Vegetables in a cheese sauce. Regular canned vegetables (not low-sodium or reduced-sodium). Regular canned tomato sauce and paste (not low-sodium or reduced-sodium). Regular tomato and vegetable juice (not low-sodium or reduced-sodium). Pickles. Olives.  Fruits   Canned fruit in a light or heavy syrup. Fried fruit. Fruit in cream or butter sauce.  Meat and other protein foods   Fatty cuts of meat. Ribs. Fried meat. Patton. Sausage. Bologna and other processed lunch meats. Salami. Fatback. Hotdogs. Bratwurst. Salted nuts and seeds. Canned beans with added salt. Canned or smoked fish. Whole eggs or egg yolks. Chicken or turkey with skin.  Dairy   Whole or 2% milk, cream, and half-and-half. Whole or full-fat cream cheese. Whole-fat or sweetened yogurt. Full-fat cheese. Nondairy creamers. Whipped toppings. Processed cheese and cheese spreads.  Fats and oils   Butter. Stick  margarine. Lard. Shortening. Ghee. Patton fat. Tropical oils, such as coconut, palm kernel, or palm oil.  Seasoning and other foods   Salted popcorn and pretzels. Onion salt, garlic salt, seasoned salt, table salt, and sea salt. Worcestershire sauce. Tartar sauce. Barbecue sauce. Teriyaki sauce. Soy sauce, including reduced-sodium. Steak sauce. Canned and packaged gravies. Fish sauce. Oyster sauce. Cocktail sauce. Horseradish that you find on the shelf. Ketchup. Mustard. Meat flavorings and tenderizers. Bouillon cubes. Hot sauce and Tabasco sauce. Premade or packaged marinades. Premade or packaged taco seasonings. Relishes. Regular salad dressings.  Where to find more information:  · National Heart, Lung, and Blood [place]: www.nhlbi.nih.gov  · American Heart Association: www.heart.org  Summary  · The DASH eating plan is a healthy eating plan that has been shown to reduce high blood pressure (hypertension). It may also reduce your risk for type 2 diabetes, heart disease, and stroke.  · With the DASH eating plan, you should limit salt (sodium) intake to 2,300 mg a day. If you have hypertension, you may need to reduce your sodium intake to 1,500 mg a day.  · When on the DASH eating plan, aim to eat more fresh fruits and vegetables, whole grains, lean proteins, low-fat dairy, and heart-healthy fats.  · Work with your health care provider or diet and nutrition specialist (dietitian) to adjust your eating plan to your individual calorie needs.  This information is not intended to replace advice given to you by your health care provider. Make sure you discuss any questions you have with your health care provider.  Document Released: 12/06/2012 Document Revised: 12/11/2017 Document Reviewed: 12/11/2017  [employer] Interactive Patient Education © 2017 [employer]    Preventive Care 40-64 Years, Female  Preventive care refers to lifestyle choices and visits with your health care provider that can promote health and  wellness.  What does preventive care include?  · A yearly physical exam. This is also called an annual well check.  · Dental exams once or twice a year.  · Routine eye exams. Ask your health care provider how often you should have your eyes checked.  · Personal lifestyle choices, including:  ¨ Daily care of your teeth and gums.  ¨ Regular physical activity.  ¨ Eating a healthy diet.  ¨ Avoiding tobacco and drug use.  ¨ Limiting alcohol use.  ¨ Practicing safe sex.  ¨ Taking low-dose aspirin daily starting at age 50.  ¨ Taking vitamin and mineral supplements as recommended by your health care provider.  What happens during an annual well check?  The services and screenings done by your health care provider during your annual well check will depend on your age, overall health, lifestyle risk factors, and family history of disease.  Counseling   Your health care provider may ask you questions about your:  · Alcohol use.  · Tobacco use.  · Drug use.  · Emotional well-being.  · Home and relationship well-being.  · Sexual activity.  · Eating habits.  · Work and work environment.  · Method of birth control.  · Menstrual cycle.  · Pregnancy history.  Screening   You may have the following tests or measurements:  · Height, weight, and BMI.  · Blood pressure.  · Lipid and cholesterol levels. These may be checked every 5 years, or more frequently if you are over 50 years old.  · Skin check.  · Lung cancer screening. You may have this screening every year starting at age 55 if you have a 30-pack-year history of smoking and currently smoke or have quit within the past 15 years.  · Fecal occult blood test (FOBT) of the stool. You may have this test every year starting at age 50.  · Flexible sigmoidoscopy or colonoscopy. You may have a sigmoidoscopy every 5 years or a colonoscopy every 10 years starting at age 50.  · Hepatitis C blood test.  · Hepatitis B blood test.  · Sexually transmitted disease (STD) testing.  · Diabetes  screening. This is done by checking your blood sugar (glucose) after you have not eaten for a while (fasting). You may have this done every 1-3 years.  · Mammogram. This may be done every 1-2 years. Talk to your health care provider about when you should start having regular mammograms. This may depend on whether you have a family history of breast cancer.  · BRCA-related cancer screening. This may be done if you have a family history of breast, ovarian, tubal, or peritoneal cancers.  · Pelvic exam and Pap test. This may be done every 3 years starting at age 21. Starting at age 30, this may be done every 5 years if you have a Pap test in combination with an HPV test.  · Bone density scan. This is done to screen for osteoporosis. You may have this scan if you are at high risk for osteoporosis.  Discuss your test results, treatment options, and if necessary, the need for more tests with your health care provider.  Vaccines   Your health care provider may recommend certain vaccines, such as:  · Influenza vaccine. This is recommended every year.  · Tetanus, diphtheria, and acellular pertussis (Tdap, Td) vaccine. You may need a Td booster every 10 years.  · Varicella vaccine. You may need this if you have not been vaccinated.  · Zoster vaccine. You may need this after age 60.  · Measles, mumps, and rubella (MMR) vaccine. You may need at least one dose of MMR if you were born in 1957 or later. You may also need a second dose.  · Pneumococcal 13-valent conjugate (PCV13) vaccine. You may need this if you have certain conditions and were not previously vaccinated.  · Pneumococcal polysaccharide (PPSV23) vaccine. You may need one or two doses if you smoke cigarettes or if you have certain conditions.  · Meningococcal vaccine. You may need this if you have certain conditions.  · Hepatitis A vaccine. You may need this if you have certain conditions or if you travel or work in places where you may be exposed to hepatitis  A.  · Hepatitis B vaccine. You may need this if you have certain conditions or if you travel or work in places where you may be exposed to hepatitis B.  · Haemophilus influenzae type b (Hib) vaccine. You may need this if you have certain conditions.  Talk to your health care provider about which screenings and vaccines you need and how often you need them.  This information is not intended to replace advice given to you by your health care provider. Make sure you discuss any questions you have with your health care provider.  Document Released: 01/13/2017 Document Revised: 09/06/2017 Document Reviewed: 10/18/2016  [email or web address] Interactive Patient Education © 2017 [email or web address]    Steps to Quit Smoking  Smoking tobacco can be harmful to your health and can affect almost every organ in your body. Smoking puts you, and those around you, at risk for developing many serious chronic diseases. Quitting smoking is difficult, but it is one of the best things that you can do for your health. It is never too late to quit.  What are the benefits of quitting smoking?  When you quit smoking, you lower your risk of developing serious diseases and conditions, such as:  · Lung cancer or lung disease, such as COPD.  · Heart disease.  · Stroke.  · Heart attack.  · Infertility.  · Osteoporosis and bone fractures.  Additionally, symptoms such as coughing, wheezing, and shortness of breath may get better when you quit. You may also find that you get sick less often because your body is stronger at fighting off colds and infections. If you are pregnant, quitting smoking can help to reduce your chances of having a baby of low birth weight.  How do I get ready to quit?  When you decide to quit smoking, create a plan to make sure that you are successful. Before you quit:  · Pick a date to quit. Set a date within the next two weeks to give you time to prepare.  · Write down the reasons why you are quitting. Keep this list in places where you will  see it often, such as on your bathroom mirror or in your car or wallet.  · Identify the people, places, things, and activities that make you want to smoke (triggers) and avoid them. Make sure to take these actions:  ¨ Throw away all cigarettes at home, at work, and in your car.  ¨ Throw away smoking accessories, such as ashtrays and lighters.  ¨ Clean your car and make sure to empty the ashtray.  ¨ Clean your home, including curtains and carpets.  · Tell your family, friends, and coworkers that you are quitting. Support from your loved ones can make quitting easier.  · Talk with your health care provider about your options for quitting smoking.  · Find out what treatment options are covered by your health insurance.  What strategies can I use to quit smoking?  Talk with your healthcare provider about different strategies to quit smoking. Some strategies include:  · Quitting smoking altogether instead of gradually lessening how much you smoke over a period of time. Research shows that quitting “cold turkey” is more successful than gradually quitting.  · Attending in-person counseling to help you build problem-solving skills. You are more likely to have success in quitting if you attend several counseling sessions. Even short sessions of 10 minutes can be effective.  · Finding resources and support systems that can help you to quit smoking and remain smoke-free after you quit. These resources are most helpful when you use them often. They can include:  ¨ Online chats with a counselor.  ¨ Telephone quitlines.  ¨ Printed self-help materials.  ¨ Support groups or group counseling.  ¨ Text messaging programs.  ¨ Mobile phone applications.  · Taking medicines to help you quit smoking. (If you are pregnant or breastfeeding, talk with your health care provider first.) Some medicines contain nicotine and some do not. Both types of medicines help with cravings, but the medicines that include nicotine help to relieve withdrawal  symptoms. Your health care provider may recommend:  ¨ Nicotine patches, gum, or lozenges.  ¨ Nicotine inhalers or sprays.  ¨ Non-nicotine medicine that is taken by mouth.  Talk with your health care provider about combining strategies, such as taking medicines while you are also receiving in-person counseling. Using these two strategies together makes you more likely to succeed in quitting than if you used either strategy on its own.  If you are pregnant or breastfeeding, talk with your health care provider about finding counseling or other support strategies to quit smoking. Do not take medicine to help you quit smoking unless told to do so by your health care provider.  What things can I do to make it easier to quit?  Quitting smoking might feel overwhelming at first, but there is a lot that you can do to make it easier. Take these important actions:  · Reach out to your family and friends and ask that they support and encourage you during this time. Call telephone quitlines, reach out to support groups, or work with a counselor for support.  · Ask people who smoke to avoid smoking around you.  · Avoid places that trigger you to smoke, such as bars, parties, or smoke-break areas at work.  · Spend time around people who do not smoke.  · Lessen stress in your life, because stress can be a smoking trigger for some people. To lessen stress, try:  ¨ Exercising regularly.  ¨ Deep-breathing exercises.  ¨ Yoga.  ¨ Meditating.  ¨ Performing a body scan. This involves closing your eyes, scanning your body from head to toe, and noticing which parts of your body are particularly tense. Purposefully relax the muscles in those areas.  · Download or purchase mobile phone or tablet apps (applications) that can help you stick to your quit plan by providing reminders, tips, and encouragement. There are many free apps, such as QuitGuide from the CDC (Centers for Disease Control and Prevention). You can find other support for  quitting smoking (smoking cessation) through smokefree.gov and other websites.  How will I feel when I quit smoking?  Within the first 24 hours of quitting smoking, you may start to feel some withdrawal symptoms. These symptoms are usually most noticeable 2-3 days after quitting, but they usually do not last beyond 2-3 weeks. Changes or symptoms that you might experience include:  · Mood swings.  · Restlessness, anxiety, or irritation.  · Difficulty concentrating.  · Dizziness.  · Strong cravings for sugary foods in addition to nicotine.  · Mild weight gain.  · Constipation.  · Nausea.  · Coughing or a sore throat.  · Changes in how your medicines work in your body.  · A depressed mood.  · Difficulty sleeping (insomnia).  After the first 2-3 weeks of quitting, you may start to notice more positive results, such as:  · Improved sense of smell and taste.  · Decreased coughing and sore throat.  · Slower heart rate.  · Lower blood pressure.  · Clearer skin.  · The ability to breathe more easily.  · Fewer sick days.  Quitting smoking is very challenging for most people. Do not get discouraged if you are not successful the first time. Some people need to make many attempts to quit before they achieve long-term success. Do your best to stick to your quit plan, and talk with your health care provider if you have any questions or concerns.  This information is not intended to replace advice given to you by your health care provider. Make sure you discuss any questions you have with your health care provider.  Document Released: 12/12/2002 Document Revised: 08/15/2017 Document Reviewed: 05/03/2016  [employer] Interactive Patient Education © 2017 [employer]

## 2018-05-30 ENCOUNTER — TELEPHONE (OUTPATIENT)
Dept: FAMILY MEDICINE CLINIC | Facility: CLINIC | Age: 58
End: 2018-05-30

## 2018-07-14 DIAGNOSIS — I10 ESSENTIAL HYPERTENSION: ICD-10-CM

## 2018-07-17 RX ORDER — AMLODIPINE BESYLATE 5 MG/1
TABLET ORAL
Qty: 90 TABLET | Refills: 1 | Status: SHIPPED | OUTPATIENT
Start: 2018-07-17 | End: 2018-08-14 | Stop reason: SDUPTHER

## 2018-08-14 ENCOUNTER — OFFICE VISIT (OUTPATIENT)
Dept: FAMILY MEDICINE CLINIC | Facility: CLINIC | Age: 58
End: 2018-08-14

## 2018-08-14 VITALS
SYSTOLIC BLOOD PRESSURE: 130 MMHG | DIASTOLIC BLOOD PRESSURE: 80 MMHG | TEMPERATURE: 98.7 F | HEART RATE: 81 BPM | BODY MASS INDEX: 24.08 KG/M2 | HEIGHT: 63 IN | RESPIRATION RATE: 20 BRPM | OXYGEN SATURATION: 97 % | WEIGHT: 135.9 LBS

## 2018-08-14 DIAGNOSIS — E78.1 HYPERTRIGLYCERIDEMIA: Chronic | ICD-10-CM

## 2018-08-14 DIAGNOSIS — I10 ESSENTIAL HYPERTENSION: Primary | Chronic | ICD-10-CM

## 2018-08-14 DIAGNOSIS — R63.4 WEIGHT LOSS: ICD-10-CM

## 2018-08-14 DIAGNOSIS — E55.9 VITAMIN D DEFICIENCY: Chronic | ICD-10-CM

## 2018-08-14 LAB
ALBUMIN UR-MCNC: <0.6 MG/L
CREAT UR-MCNC: 81.7 MG/DL
DEPRECATED RDW RBC AUTO: 51.2 FL (ref 36.4–46.3)
ERYTHROCYTE [DISTWIDTH] IN BLOOD BY AUTOMATED COUNT: 14.3 % (ref 11.5–14.5)
HBA1C MFR BLD: 5.8 % (ref 4–5.6)
HCT VFR BLD AUTO: 38.3 % (ref 35–45)
HGB BLD-MCNC: 12.7 G/DL (ref 12–15.5)
MCH RBC QN AUTO: 32.3 PG (ref 26.5–34)
MCHC RBC AUTO-ENTMCNC: 33.2 G/DL (ref 31.4–36)
MCV RBC AUTO: 97.5 FL (ref 80–98)
MICROALBUMIN/CREAT UR: NORMAL MG/G (ref 0–30)
PLATELET # BLD AUTO: 280 10*3/MM3 (ref 150–450)
PMV BLD AUTO: 10.6 FL (ref 8–12)
RBC # BLD AUTO: 3.93 10*6/MM3 (ref 3.77–5.16)
WBC NRBC COR # BLD: 8.23 10*3/MM3 (ref 3.2–9.8)

## 2018-08-14 PROCEDURE — 84443 ASSAY THYROID STIM HORMONE: CPT | Performed by: NURSE PRACTITIONER

## 2018-08-14 PROCEDURE — 99214 OFFICE O/P EST MOD 30 MIN: CPT | Performed by: NURSE PRACTITIONER

## 2018-08-14 PROCEDURE — 83036 HEMOGLOBIN GLYCOSYLATED A1C: CPT | Performed by: NURSE PRACTITIONER

## 2018-08-14 PROCEDURE — 82043 UR ALBUMIN QUANTITATIVE: CPT | Performed by: NURSE PRACTITIONER

## 2018-08-14 PROCEDURE — 36415 COLL VENOUS BLD VENIPUNCTURE: CPT | Performed by: NURSE PRACTITIONER

## 2018-08-14 PROCEDURE — 82570 ASSAY OF URINE CREATININE: CPT | Performed by: NURSE PRACTITIONER

## 2018-08-14 PROCEDURE — 82306 VITAMIN D 25 HYDROXY: CPT | Performed by: NURSE PRACTITIONER

## 2018-08-14 PROCEDURE — 80061 LIPID PANEL: CPT | Performed by: NURSE PRACTITIONER

## 2018-08-14 PROCEDURE — 80053 COMPREHEN METABOLIC PANEL: CPT | Performed by: NURSE PRACTITIONER

## 2018-08-14 PROCEDURE — 85027 COMPLETE CBC AUTOMATED: CPT | Performed by: NURSE PRACTITIONER

## 2018-08-14 RX ORDER — AMITRIPTYLINE HYDROCHLORIDE 25 MG/1
25 TABLET, FILM COATED ORAL NIGHTLY
Qty: 30 TABLET | Refills: 5 | Status: SHIPPED | OUTPATIENT
Start: 2018-08-14 | End: 2019-02-12 | Stop reason: SDUPTHER

## 2018-08-14 RX ORDER — AMLODIPINE BESYLATE 5 MG/1
5 TABLET ORAL DAILY
Qty: 90 TABLET | Refills: 3 | Status: SHIPPED | OUTPATIENT
Start: 2018-08-14 | End: 2019-02-12 | Stop reason: SDUPTHER

## 2018-08-14 NOTE — PROGRESS NOTES
Subjective   Jo Ann Lacy is a 57 y.o. female.     She presents today for her routine follow up on chronic medical problems.  Her BP is well controlled on her current medication without side effects.  Unsure how well her cholesterol is controlled as she never had the fasting labs that we ordered for her the last time she was in the office.  She still has concerns with weight loss.  She has lost 4 lbs since her last office visit with me.  She would like to try something to help with weight loss.  Reports that she is currently in the process of moving and does not have any help so this has been very stressful for her.      Hypertension   This is a chronic problem. The current episode started more than 1 year ago. The problem has been resolved since onset. The problem is controlled. Associated symptoms include malaise/fatigue. Pertinent negatives include no anxiety, blurred vision, chest pain, headaches, neck pain, orthopnea, palpitations, peripheral edema, PND, shortness of breath or sweats. There are no associated agents to hypertension. Risk factors for coronary artery disease include family history, dyslipidemia, post-menopausal state, sedentary lifestyle, stress and smoking/tobacco exposure. Past treatments include calcium channel blockers. Current antihypertension treatment includes calcium channel blockers. The current treatment provides significant improvement. Compliance problems include diet and exercise.  There is no history of chronic renal disease.   Hyperlipidemia   This is a chronic problem. The current episode started more than 1 year ago. She has no history of chronic renal disease, diabetes, hypothyroidism, liver disease, obesity or nephrotic syndrome. There are no known factors aggravating her hyperlipidemia. Pertinent negatives include no chest pain, focal sensory loss, focal weakness, leg pain, myalgias or shortness of breath. Current antihyperlipidemic treatment includes fibric acid derivatives.  Compliance problems include adherence to diet, adherence to exercise and psychosocial issues.  Risk factors for coronary artery disease include dyslipidemia, family history, hypertension, post-menopausal, a sedentary lifestyle and stress.   Weight Loss   This is a chronic problem. The current episode started more than 1 year ago. The problem occurs constantly. The problem has been unchanged. Pertinent negatives include no abdominal pain, anorexia, arthralgias, change in bowel habit, chest pain, chills, congestion, coughing, diaphoresis, fatigue, fever, headaches, joint swelling, myalgias, nausea, neck pain, numbness, rash, sore throat, swollen glands, urinary symptoms, vertigo, visual change, vomiting or weakness. The treatment provided no relief.        The following portions of the patient's history were reviewed and updated as appropriate: allergies, current medications, past family history, past medical history, past social history, past surgical history and problem list.    Review of Systems   Constitutional: Positive for malaise/fatigue and unexpected weight loss. Negative for chills, diaphoresis, fatigue and fever.   HENT: Negative.  Negative for congestion and sore throat.    Eyes: Negative.  Negative for blurred vision.   Respiratory: Negative.  Negative for cough and shortness of breath.    Cardiovascular: Negative.  Negative for chest pain, palpitations, orthopnea and PND.   Gastrointestinal: Negative.  Negative for abdominal pain, anorexia, change in bowel habit, nausea and vomiting.   Musculoskeletal: Negative.  Negative for arthralgias, joint swelling, myalgias and neck pain.   Skin: Negative.  Negative for rash.   Allergic/Immunologic: Negative.    Neurological: Negative.  Negative for vertigo, focal weakness, weakness and numbness.   Hematological: Negative.    Psychiatric/Behavioral: Negative.  Positive for stress.       Objective   Physical Exam   Constitutional: She is oriented to person, place,  and time. Vital signs are normal. She appears well-developed and well-nourished. No distress.   HENT:   Head: Normocephalic.   Right Ear: External ear normal.   Left Ear: External ear normal.   Nose: Nose normal.   Mouth/Throat: Oropharynx is clear and moist. No oropharyngeal exudate.   Eyes: Pupils are equal, round, and reactive to light. Conjunctivae and EOM are normal. Right eye exhibits no discharge. Left eye exhibits no discharge.   Neck: Normal range of motion. Neck supple. No tracheal deviation present. No thyromegaly present.   Cardiovascular: Normal rate, regular rhythm and normal heart sounds.  Exam reveals no gallop and no friction rub.    No murmur heard.  Pulmonary/Chest: Effort normal and breath sounds normal. No respiratory distress. She has no wheezes. She has no rales. She exhibits no tenderness.   Musculoskeletal: Normal range of motion.   Lymphadenopathy:     She has no cervical adenopathy.   Neurological: She is alert and oriented to person, place, and time.   Skin: Skin is warm and dry. Capillary refill takes less than 2 seconds. No rash noted. She is not diaphoretic. No erythema. No pallor.   Psychiatric: She has a normal mood and affect. Her behavior is normal. Judgment and thought content normal.   Nursing note and vitals reviewed.        Assessment/Plan   Jo Ann was seen today for follow-up and hypertension.    Diagnoses and all orders for this visit:    Essential hypertension  -     amLODIPine (NORVASC) 5 MG tablet; Take 1 tablet by mouth Daily.    Hypertriglyceridemia    Vitamin D deficiency    Weight loss  -     amitriptyline (ELAVIL) 25 MG tablet; Take 1 tablet by mouth Every Night.    Patient's Body mass index is 24.07 kg/m². BMI is within normal parameters. No follow-up required.  Start on Elavil once daily at bedtime to help with weight gain.  Fasting labs that were previously ordered will be collected today in the office.  Continue all other current medications.  Follow up in 6  months for routine follow up.  Follow up sooner for problems/concerns.  Patient verbalized understanding and agreement with plan of care.        This document has been electronically signed by RODNEY David on August 27, 2018 11:09 PM

## 2018-08-14 NOTE — PATIENT INSTRUCTIONS
"Managing Your Hypertension  Hypertension is commonly called high blood pressure. This is when the force of your blood pressing against the walls of your arteries is too strong. Arteries are blood vessels that carry blood from your heart throughout your body. Hypertension forces the heart to work harder to pump blood, and may cause the arteries to become narrow or stiff. Having untreated or uncontrolled hypertension can cause heart attack, stroke, kidney disease, and other problems.  What are blood pressure readings?  A blood pressure reading consists of a higher number over a lower number. Ideally, your blood pressure should be below 120/80. The first (\"top\") number is called the systolic pressure. It is a measure of the pressure in your arteries as your heart beats. The second (\"bottom\") number is called the diastolic pressure. It is a measure of the pressure in your arteries as the heart relaxes.  What does my blood pressure reading mean?  Blood pressure is classified into four stages. Based on your blood pressure reading, your health care provider may use the following stages to determine what type of treatment you need, if any. Systolic pressure and diastolic pressure are measured in a unit called mm Hg.  Normal  · Systolic pressure: below 120.  · Diastolic pressure: below 80.  Elevated  · Systolic pressure: 120-129.  · Diastolic pressure: below 80.  Hypertension stage 1  · Systolic pressure: 130-139.  · Diastolic pressure: 80-89.  Hypertension stage 2  · Systolic pressure: 140 or above.  · Diastolic pressure: 90 or above.  What health risks are associated with hypertension?  Managing your hypertension is an important responsibility. Uncontrolled hypertension can lead to:  · A heart attack.  · A stroke.  · A weakened blood vessel (aneurysm).  · Heart failure.  · Kidney damage.  · Eye damage.  · Metabolic syndrome.  · Memory and concentration problems.    What changes can I make to manage my " hypertension?  Hypertension can be managed by making lifestyle changes and possibly by taking medicines. Your health care provider will help you make a plan to bring your blood pressure within a normal range.  Eating and drinking  · Eat a diet that is high in fiber and potassium, and low in salt (sodium), added sugar, and fat. An example eating plan is called the DASH (Dietary Approaches to Stop Hypertension) diet. To eat this way:  ? Eat plenty of fresh fruits and vegetables. Try to fill half of your plate at each meal with fruits and vegetables.  ? Eat whole grains, such as whole wheat pasta, brown rice, or whole grain bread. Fill about one quarter of your plate with whole grains.  ? Eat low-fat diary products.  ? Avoid fatty cuts of meat, processed or cured meats, and poultry with skin. Fill about one quarter of your plate with lean proteins such as fish, chicken without skin, beans, eggs, and tofu.  ? Avoid premade and processed foods. These tend to be higher in sodium, added sugar, and fat.  · Reduce your daily sodium intake. Most people with hypertension should eat less than 1,500 mg of sodium a day.  · Limit alcohol intake to no more than 1 drink a day for nonpregnant women and 2 drinks a day for men. One drink equals 12 oz of beer, 5 oz of wine, or 1½ oz of hard liquor.  Lifestyle  · Work with your health care provider to maintain a healthy body weight, or to lose weight. Ask what an ideal weight is for you.  · Get at least 30 minutes of exercise that causes your heart to beat faster (aerobic exercise) most days of the week. Activities may include walking, swimming, or biking.  · Include exercise to strengthen your muscles (resistance exercise), such as weight lifting, as part of your weekly exercise routine. Try to do these types of exercises for 30 minutes at least 3 days a week.  · Do not use any products that contain nicotine or tobacco, such as cigarettes and e-cigarettes. If you need help quitting, ask  your health care provider.  · Control any long-term (chronic) conditions you have, such as high cholesterol or diabetes.  Monitoring  · Monitor your blood pressure at home as told by your health care provider. Your personal target blood pressure may vary depending on your medical conditions, your age, and other factors.  · Have your blood pressure checked regularly, as often as told by your health care provider.  Working with your health care provider  · Review all the medicines you take with your health care provider because there may be side effects or interactions.  · Talk with your health care provider about your diet, exercise habits, and other lifestyle factors that may be contributing to hypertension.  · Visit your health care provider regularly. Your health care provider can help you create and adjust your plan for managing hypertension.  Will I need medicine to control my blood pressure?  Your health care provider may prescribe medicine if lifestyle changes are not enough to get your blood pressure under control, and if:  · Your systolic blood pressure is 130 or higher.  · Your diastolic blood pressure is 80 or higher.    Take medicines only as told by your health care provider. Follow the directions carefully. Blood pressure medicines must be taken as prescribed. The medicine does not work as well when you skip doses. Skipping doses also puts you at risk for problems.  Contact a health care provider if:  · You think you are having a reaction to medicines you have taken.  · You have repeated (recurrent) headaches.  · You feel dizzy.  · You have swelling in your ankles.  · You have trouble with your vision.  Get help right away if:  · You develop a severe headache or confusion.  · You have unusual weakness or numbness, or you feel faint.  · You have severe pain in your chest or abdomen.  · You vomit repeatedly.  · You have trouble breathing.  Summary  · Hypertension is when the force of blood pumping through  your arteries is too strong. If this condition is not controlled, it may put you at risk for serious complications.  · Your personal target blood pressure may vary depending on your medical conditions, your age, and other factors. For most people, a normal blood pressure is less than 120/80.  · Hypertension is managed by lifestyle changes, medicines, or both. Lifestyle changes include weight loss, eating a healthy, low-sodium diet, exercising more, and limiting alcohol.  This information is not intended to replace advice given to you by your health care provider. Make sure you discuss any questions you have with your health care provider.  Document Released: 09/11/2013 Document Revised: 11/15/2017 Document Reviewed: 11/15/2017  [employer] Interactive Patient Education © 2018 [employer]  Preventive Care 40-64 Years, Female  Preventive care refers to lifestyle choices and visits with your health care provider that can promote health and wellness.  What does preventive care include?  · A yearly physical exam. This is also called an annual well check.  · Dental exams once or twice a year.  · Routine eye exams. Ask your health care provider how often you should have your eyes checked.  · Personal lifestyle choices, including:  ? Daily care of your teeth and gums.  ? Regular physical activity.  ? Eating a healthy diet.  ? Avoiding tobacco and drug use.  ? Limiting alcohol use.  ? Practicing safe sex.  ? Taking low-dose aspirin daily starting at age 50.  ? Taking vitamin and mineral supplements as recommended by your health care provider.  What happens during an annual well check?  The services and screenings done by your health care provider during your annual well check will depend on your age, overall health, lifestyle risk factors, and family history of disease.  Counseling  Your health care provider may ask you questions about your:  · Alcohol use.  · Tobacco use.  · Drug use.  · Emotional well-being.  · Home and  relationship well-being.  · Sexual activity.  · Eating habits.  · Work and work environment.  · Method of birth control.  · Menstrual cycle.  · Pregnancy history.    Screening  You may have the following tests or measurements:  · Height, weight, and BMI.  · Blood pressure.  · Lipid and cholesterol levels. These may be checked every 5 years, or more frequently if you are over 50 years old.  · Skin check.  · Lung cancer screening. You may have this screening every year starting at age 55 if you have a 30-pack-year history of smoking and currently smoke or have quit within the past 15 years.  · Fecal occult blood test (FOBT) of the stool. You may have this test every year starting at age 50.  · Flexible sigmoidoscopy or colonoscopy. You may have a sigmoidoscopy every 5 years or a colonoscopy every 10 years starting at age 50.  · Hepatitis C blood test.  · Hepatitis B blood test.  · Sexually transmitted disease (STD) testing.  · Diabetes screening. This is done by checking your blood sugar (glucose) after you have not eaten for a while (fasting). You may have this done every 1-3 years.  · Mammogram. This may be done every 1-2 years. Talk to your health care provider about when you should start having regular mammograms. This may depend on whether you have a family history of breast cancer.  · BRCA-related cancer screening. This may be done if you have a family history of breast, ovarian, tubal, or peritoneal cancers.  · Pelvic exam and Pap test. This may be done every 3 years starting at age 21. Starting at age 30, this may be done every 5 years if you have a Pap test in combination with an HPV test.  · Bone density scan. This is done to screen for osteoporosis. You may have this scan if you are at high risk for osteoporosis.    Discuss your test results, treatment options, and if necessary, the need for more tests with your health care provider.  Vaccines  Your health care provider may recommend certain vaccines, such  as:  · Influenza vaccine. This is recommended every year.  · Tetanus, diphtheria, and acellular pertussis (Tdap, Td) vaccine. You may need a Td booster every 10 years.  · Varicella vaccine. You may need this if you have not been vaccinated.  · Zoster vaccine. You may need this after age 60.  · Measles, mumps, and rubella (MMR) vaccine. You may need at least one dose of MMR if you were born in 1957 or later. You may also need a second dose.  · Pneumococcal 13-valent conjugate (PCV13) vaccine. You may need this if you have certain conditions and were not previously vaccinated.  · Pneumococcal polysaccharide (PPSV23) vaccine. You may need one or two doses if you smoke cigarettes or if you have certain conditions.  · Meningococcal vaccine. You may need this if you have certain conditions.  · Hepatitis A vaccine. You may need this if you have certain conditions or if you travel or work in places where you may be exposed to hepatitis A.  · Hepatitis B vaccine. You may need this if you have certain conditions or if you travel or work in places where you may be exposed to hepatitis B.  · Haemophilus influenzae type b (Hib) vaccine. You may need this if you have certain conditions.    Talk to your health care provider about which screenings and vaccines you need and how often you need them.  This information is not intended to replace advice given to you by your health care provider. Make sure you discuss any questions you have with your health care provider.  Document Released: 01/13/2017 Document Revised: 09/06/2017 Document Reviewed: 10/18/2016  [employer] Interactive Patient Education © 2018 [employer]

## 2018-08-15 LAB
25(OH)D3 SERPL-MCNC: 24.9 NG/ML (ref 30–100)
ALBUMIN SERPL-MCNC: 4.2 G/DL (ref 3.4–4.8)
ALBUMIN/GLOB SERPL: 1.5 G/DL (ref 1.1–1.8)
ALP SERPL-CCNC: 66 U/L (ref 38–126)
ALT SERPL W P-5'-P-CCNC: 20 U/L (ref 9–52)
ANION GAP SERPL CALCULATED.3IONS-SCNC: 9 MMOL/L (ref 5–15)
ARTICHOKE IGE QN: 74 MG/DL (ref 1–129)
AST SERPL-CCNC: 19 U/L (ref 14–36)
BILIRUB SERPL-MCNC: 0.3 MG/DL (ref 0.2–1.3)
BUN BLD-MCNC: 17 MG/DL (ref 7–21)
BUN/CREAT SERPL: 25.8 (ref 7–25)
CALCIUM SPEC-SCNC: 9.2 MG/DL (ref 8.4–10.2)
CHLORIDE SERPL-SCNC: 109 MMOL/L (ref 95–110)
CHOLEST SERPL-MCNC: 184 MG/DL (ref 0–199)
CO2 SERPL-SCNC: 22 MMOL/L (ref 22–31)
CREAT BLD-MCNC: 0.66 MG/DL (ref 0.5–1)
GFR SERPL CREATININE-BSD FRML MDRD: 111 ML/MIN/1.73 (ref 51–120)
GLOBULIN UR ELPH-MCNC: 2.8 GM/DL (ref 2.3–3.5)
GLUCOSE BLD-MCNC: 93 MG/DL (ref 60–100)
HDLC SERPL-MCNC: 57 MG/DL (ref 60–200)
LDLC/HDLC SERPL: 1.51 {RATIO} (ref 0–3.22)
POTASSIUM BLD-SCNC: 4.1 MMOL/L (ref 3.5–5.1)
PROT SERPL-MCNC: 7 G/DL (ref 6.3–8.6)
SODIUM BLD-SCNC: 140 MMOL/L (ref 137–145)
TRIGL SERPL-MCNC: 206 MG/DL (ref 20–199)
TSH SERPL DL<=0.05 MIU/L-ACNC: 1.52 MIU/ML (ref 0.46–4.68)

## 2018-08-21 DIAGNOSIS — E78.00 HYPERCHOLESTEREMIA: Primary | ICD-10-CM

## 2018-08-21 RX ORDER — EZETIMIBE 10 MG/1
10 TABLET ORAL DAILY
Qty: 90 TABLET | Refills: 1 | Status: SHIPPED | OUTPATIENT
Start: 2018-08-21 | End: 2018-09-20 | Stop reason: CLARIF

## 2018-08-28 ENCOUNTER — TELEPHONE (OUTPATIENT)
Dept: FAMILY MEDICINE CLINIC | Facility: CLINIC | Age: 58
End: 2018-08-28

## 2018-08-28 NOTE — TELEPHONE ENCOUNTER
----- Message from RODNEY David sent at 8/21/2018  9:40 AM CDT -----  Her triglycerides are too high.  I am going to send in Zetia 10 mg once daily.  A1C continues to be mildly elevated at 5.8.  Continue to monitor diet closely and avoid greasy/sugary substances.

## 2018-08-29 ENCOUNTER — TELEPHONE (OUTPATIENT)
Dept: FAMILY MEDICINE CLINIC | Facility: CLINIC | Age: 58
End: 2018-08-29

## 2018-08-29 NOTE — TELEPHONE ENCOUNTER
I returned Pt's call to inform her of her lab results. Pt wants to know if you can write a statement work. Stating that she needs bathroom breaks due to blood presasure.

## 2018-09-20 DIAGNOSIS — E78.5 DYSLIPIDEMIA: Primary | ICD-10-CM

## 2018-09-20 RX ORDER — ROSUVASTATIN CALCIUM 20 MG/1
20 TABLET, COATED ORAL DAILY
Qty: 90 TABLET | Refills: 1 | Status: SHIPPED | OUTPATIENT
Start: 2018-09-20 | End: 2019-02-12 | Stop reason: SDUPTHER

## 2019-02-12 ENCOUNTER — OFFICE VISIT (OUTPATIENT)
Dept: FAMILY MEDICINE CLINIC | Facility: CLINIC | Age: 59
End: 2019-02-12

## 2019-02-12 VITALS
OXYGEN SATURATION: 98 % | HEART RATE: 93 BPM | WEIGHT: 139.5 LBS | RESPIRATION RATE: 20 BRPM | HEIGHT: 63 IN | DIASTOLIC BLOOD PRESSURE: 80 MMHG | SYSTOLIC BLOOD PRESSURE: 120 MMHG | TEMPERATURE: 98.2 F | BODY MASS INDEX: 24.72 KG/M2

## 2019-02-12 DIAGNOSIS — J40 BRONCHITIS: Primary | ICD-10-CM

## 2019-02-12 DIAGNOSIS — R63.4 WEIGHT LOSS: ICD-10-CM

## 2019-02-12 DIAGNOSIS — E78.5 DYSLIPIDEMIA: ICD-10-CM

## 2019-02-12 DIAGNOSIS — I10 ESSENTIAL HYPERTENSION: Chronic | ICD-10-CM

## 2019-02-12 PROCEDURE — 99214 OFFICE O/P EST MOD 30 MIN: CPT | Performed by: NURSE PRACTITIONER

## 2019-02-12 RX ORDER — LEVOFLOXACIN 500 MG/1
500 TABLET, FILM COATED ORAL DAILY
Qty: 10 TABLET | Refills: 0 | Status: SHIPPED | OUTPATIENT
Start: 2019-02-12 | End: 2019-02-14 | Stop reason: SINTOL

## 2019-02-12 RX ORDER — ALBUTEROL SULFATE 90 UG/1
2 AEROSOL, METERED RESPIRATORY (INHALATION) EVERY 4 HOURS PRN
Qty: 18 G | Refills: 1 | Status: SHIPPED | OUTPATIENT
Start: 2019-02-12 | End: 2019-08-16

## 2019-02-12 RX ORDER — AMLODIPINE BESYLATE 5 MG/1
5 TABLET ORAL DAILY
Qty: 90 TABLET | Refills: 3 | Status: SHIPPED | OUTPATIENT
Start: 2019-02-12 | End: 2020-02-25 | Stop reason: SDUPTHER

## 2019-02-12 RX ORDER — AMITRIPTYLINE HYDROCHLORIDE 25 MG/1
25 TABLET, FILM COATED ORAL NIGHTLY
Qty: 30 TABLET | Refills: 5 | Status: SHIPPED | OUTPATIENT
Start: 2019-02-12 | End: 2020-02-25 | Stop reason: SDUPTHER

## 2019-02-12 RX ORDER — GUAIFENESIN 600 MG/1
1200 TABLET, EXTENDED RELEASE ORAL 2 TIMES DAILY
Qty: 20 TABLET | Refills: 0 | Status: SHIPPED | OUTPATIENT
Start: 2019-02-12 | End: 2019-08-16

## 2019-02-12 RX ORDER — ROSUVASTATIN CALCIUM 20 MG/1
20 TABLET, COATED ORAL DAILY
Qty: 90 TABLET | Refills: 1 | Status: SHIPPED | OUTPATIENT
Start: 2019-02-12 | End: 2020-02-25 | Stop reason: SDUPTHER

## 2019-02-12 NOTE — PATIENT INSTRUCTIONS
Community-Acquired Pneumonia, Adult  Pneumonia is an infection of the lungs. One type of pneumonia can happen while a person is in a hospital. A different type can happen when a person is not in a hospital (community-acquired pneumonia). It is easy for this kind to spread from person to person. It can spread to you if you breathe near an infected person who coughs or sneezes. Some symptoms include:  · A dry cough.  · A wet (productive) cough.  · Fever.  · Sweating.  · Chest pain.    Follow these instructions at home:  · Take over-the-counter and prescription medicines only as told by your doctor.  ? Only take cough medicine if you are losing sleep.  ? If you were prescribed an antibiotic medicine, take it as told by your doctor. Do not stop taking the antibiotic even if you start to feel better.  · Sleep with your head and neck raised (elevated). You can do this by putting a few pillows under your head, or you can sleep in a recliner.  · Do not use tobacco products. These include cigarettes, chewing tobacco, and e-cigarettes. If you need help quitting, ask your doctor.  · Drink enough water to keep your pee (urine) clear or pale yellow.  A shot (vaccine) can help prevent pneumonia. Shots are often suggested for:  · People older than 65 years of age.  · People older than 19 years of age:  ? Who are having cancer treatment.  ? Who have long-term (chronic) lung disease.  ? Who have problems with their body's defense system (immune system).    You may also prevent pneumonia if you take these actions:  · Get the flu (influenza) shot every year.  · Go to the dentist as often as told.  · Wash your hands often. If soap and water are not available, use hand .    Contact a doctor if:  · You have a fever.  · You lose sleep because your cough medicine does not help.  Get help right away if:  · You are short of breath and it gets worse.  · You have more chest pain.  · Your sickness gets worse. This is very serious  if:  ? You are an older adult.  ? Your body's defense system is weak.  · You cough up blood.  This information is not intended to replace advice given to you by your health care provider. Make sure you discuss any questions you have with your health care provider.  Document Released: 06/05/2009 Document Revised: 05/25/2017 Document Reviewed: 04/13/2016  [employer] Interactive Patient Education © 2018 [employer]    Acute Bronchitis, Adult  Acute bronchitis is sudden (acute) swelling of the air tubes (bronchi) in the lungs. Acute bronchitis causes these tubes to fill with mucus, which can make it hard to breathe. It can also cause coughing or wheezing.  In adults, acute bronchitis usually goes away within 2 weeks. A cough caused by bronchitis may last up to 3 weeks. Smoking, allergies, and asthma can make the condition worse. Repeated episodes of bronchitis may cause further lung problems, such as chronic obstructive pulmonary disease (COPD).  What are the causes?  This condition can be caused by germs and by substances that irritate the lungs, including:  · Cold and flu viruses. This condition is most often caused by the same virus that causes a cold.  · Bacteria.  · Exposure to tobacco smoke, dust, fumes, and air pollution.    What increases the risk?  This condition is more likely to develop in people who:  · Have close contact with someone with acute bronchitis.  · Are exposed to lung irritants, such as tobacco smoke, dust, fumes, and vapors.  · Have a weak immune system.  · Have a respiratory condition such as asthma.    What are the signs or symptoms?  Symptoms of this condition include:  · A cough.  · Coughing up clear, yellow, or green mucus.  · Wheezing.  · Chest congestion.  · Shortness of breath.  · A fever.  · Body aches.  · Chills.  · A sore throat.    How is this diagnosed?  This condition is usually diagnosed with a physical exam. During the exam, your health care provider may order tests, such as  chest X-rays, to rule out other conditions. He or she may also:  · Test a sample of your mucus for bacterial infection.  · Check the level of oxygen in your blood. This is done to check for pneumonia.  · Do a chest X-ray or lung function testing to rule out pneumonia and other conditions.  · Perform blood tests.    Your health care provider will also ask about your symptoms and medical history.  How is this treated?  Most cases of acute bronchitis clear up over time without treatment. Your health care provider may recommend:  · Drinking more fluids. Drinking more makes your mucus thinner, which may make it easier to breathe.  · Taking a medicine for a fever or cough.  · Taking an antibiotic medicine.  · Using an inhaler to help improve shortness of breath and to control a cough.  · Using a cool mist vaporizer or humidifier to make it easier to breathe.    Follow these instructions at home:  Medicines  · Take over-the-counter and prescription medicines only as told by your health care provider.  · If you were prescribed an antibiotic, take it as told by your health care provider. Do not stop taking the antibiotic even if you start to feel better.  General instructions  · Get plenty of rest.  · Drink enough fluids to keep your urine clear or pale yellow.  · Avoid smoking and secondhand smoke. Exposure to cigarette smoke or irritating chemicals will make bronchitis worse. If you smoke and you need help quitting, ask your health care provider. Quitting smoking will help your lungs heal faster.  · Use an inhaler, cool mist vaporizer, or humidifier as told by your health care provider.  · Keep all follow-up visits as told by your health care provider. This is important.  How is this prevented?  To lower your risk of getting this condition again:  · Wash your hands often with soap and water. If soap and water are not available, use hand .  · Avoid contact with people who have cold symptoms.  · Try not to touch  your hands to your mouth, nose, or eyes.  · Make sure to get the flu shot every year.    Contact a health care provider if:  · Your symptoms do not improve in 2 weeks of treatment.  Get help right away if:  · You cough up blood.  · You have chest pain.  · You have severe shortness of breath.  · You become dehydrated.  · You faint or keep feeling like you are going to faint.  · You keep vomiting.  · You have a severe headache.  · Your fever or chills gets worse.  This information is not intended to replace advice given to you by your health care provider. Make sure you discuss any questions you have with your health care provider.  Document Released: 01/25/2006 Document Revised: 07/12/2017 Document Reviewed: 06/07/2017  [employer] Interactive Patient Education © 2018 [employer]

## 2019-02-14 ENCOUNTER — TELEPHONE (OUTPATIENT)
Dept: FAMILY MEDICINE CLINIC | Facility: CLINIC | Age: 59
End: 2019-02-14

## 2019-02-14 DIAGNOSIS — J06.9 ACUTE URI: Primary | ICD-10-CM

## 2019-02-14 RX ORDER — AZITHROMYCIN 250 MG/1
TABLET, FILM COATED ORAL
Qty: 6 TABLET | Refills: 0 | Status: SHIPPED | OUTPATIENT
Start: 2019-02-14 | End: 2019-08-16

## 2019-02-14 NOTE — TELEPHONE ENCOUNTER
Patient states that she believes that the antibiotics caused her not to be able to sleep states she also have swelling in one of her ankles another side effect she read about.  Would also like her note to be extended for another day.

## 2019-02-15 ENCOUNTER — TELEPHONE (OUTPATIENT)
Dept: FAMILY MEDICINE CLINIC | Facility: CLINIC | Age: 59
End: 2019-02-15

## 2019-02-15 NOTE — TELEPHONE ENCOUNTER
----- Message from RODNEY David sent at 2/13/2019  8:37 AM CST -----  No pneumonia noted on x-ray.

## 2019-02-27 NOTE — PROGRESS NOTES
Subjective   Jo Ann Lacy is a 58 y.o. female.     She presents today for her routine follow up on chronic medical problems.  Her BP is well controlled on her current medication without side effects.  She is tolerating her cholesterol medication without side effects.  She still has concerns with weight loss, but she has gained 4 lbs since her last office visit with me.  She reports that she is still under an immense amount of stress currently.  She has complaints of cough, congestion, chest tightness, etc over the last several days.  She denies any fever.  She is otherwise without any new complaints today in the office.      Hypertension   This is a chronic problem. The current episode started more than 1 year ago. The problem has been resolved since onset. The problem is controlled. Associated symptoms include malaise/fatigue and shortness of breath. Pertinent negatives include no anxiety, blurred vision, chest pain, headaches, neck pain, orthopnea, palpitations, peripheral edema, PND or sweats. There are no associated agents to hypertension. Risk factors for coronary artery disease include family history, dyslipidemia, post-menopausal state, sedentary lifestyle, stress and smoking/tobacco exposure. Past treatments include calcium channel blockers. Current antihypertension treatment includes calcium channel blockers. The current treatment provides significant improvement. Compliance problems include diet and exercise.  There is no history of chronic renal disease.   Hyperlipidemia   This is a chronic problem. The current episode started more than 1 year ago. She has no history of chronic renal disease, diabetes, hypothyroidism, liver disease, obesity or nephrotic syndrome. There are no known factors aggravating her hyperlipidemia. Associated symptoms include shortness of breath. Pertinent negatives include no chest pain, focal sensory loss, focal weakness, leg pain or myalgias. Current antihyperlipidemic  treatment includes fibric acid derivatives. Compliance problems include adherence to diet, adherence to exercise and psychosocial issues.  Risk factors for coronary artery disease include dyslipidemia, family history, hypertension, post-menopausal, a sedentary lifestyle and stress.   Weight Loss   This is a chronic problem. The current episode started more than 1 year ago. The problem occurs constantly. The problem has been unchanged. Associated symptoms include congestion, coughing, fatigue and a sore throat. Pertinent negatives include no abdominal pain, anorexia, arthralgias, change in bowel habit, chest pain, chills, diaphoresis, fever, headaches, joint swelling, myalgias, nausea, neck pain, numbness, rash, swollen glands, urinary symptoms, vertigo, visual change, vomiting or weakness. The treatment provided no relief.   URI    This is a new problem. The current episode started in the past 7 days. The problem has been gradually worsening. There has been no fever. Associated symptoms include congestion, coughing, rhinorrhea, sinus pain, sneezing, a sore throat and wheezing. Pertinent negatives include no abdominal pain, chest pain, diarrhea, dysuria, ear pain, headaches, joint pain, joint swelling, nausea, neck pain, plugged ear sensation, rash, swollen glands or vomiting. The treatment provided no relief.        The following portions of the patient's history were reviewed and updated as appropriate: allergies, current medications, past family history, past medical history, past social history, past surgical history and problem list.    Review of Systems   Constitutional: Positive for fatigue, malaise/fatigue and unexpected weight loss. Negative for chills, diaphoresis and fever.   HENT: Positive for congestion, postnasal drip, rhinorrhea, sinus pressure, sneezing and sore throat. Negative for ear pain.    Eyes: Negative.  Negative for blurred vision.   Respiratory: Positive for cough, chest tightness, shortness  of breath and wheezing.    Cardiovascular: Negative.  Negative for chest pain, palpitations, orthopnea and PND.   Gastrointestinal: Negative.  Negative for abdominal pain, anorexia, change in bowel habit, diarrhea, nausea and vomiting.   Genitourinary: Negative for dysuria.   Musculoskeletal: Negative.  Negative for arthralgias, joint pain, joint swelling, myalgias and neck pain.   Skin: Negative.  Negative for rash.   Allergic/Immunologic: Negative.    Neurological: Negative.  Negative for vertigo, focal weakness, weakness and numbness.   Hematological: Negative.    Psychiatric/Behavioral: Negative.        Objective   Physical Exam   Constitutional: She is oriented to person, place, and time. Vital signs are normal. She appears well-developed and well-nourished. No distress.   HENT:   Head: Normocephalic.   Right Ear: External ear normal. A middle ear effusion is present.   Left Ear: External ear normal. A middle ear effusion is present.   Nose: Mucosal edema and rhinorrhea present.   Mouth/Throat: Posterior oropharyngeal edema and posterior oropharyngeal erythema present. No oropharyngeal exudate.   Eyes: Conjunctivae and EOM are normal. Pupils are equal, round, and reactive to light. Right eye exhibits no discharge. Left eye exhibits no discharge.   Neck: Normal range of motion. Neck supple. No tracheal deviation present. No thyromegaly present.   Cardiovascular: Normal rate, regular rhythm and normal heart sounds. Exam reveals no gallop and no friction rub.   No murmur heard.  Pulmonary/Chest: Effort normal. No respiratory distress. She has wheezes. She has rhonchi. She has no rales. She exhibits no tenderness.   Musculoskeletal: Normal range of motion.   Lymphadenopathy:     She has no cervical adenopathy.   Neurological: She is alert and oriented to person, place, and time.   Skin: Skin is warm and dry. Capillary refill takes less than 2 seconds. No rash noted. She is not diaphoretic. No erythema. No pallor.    Psychiatric: She has a normal mood and affect. Her behavior is normal. Judgment and thought content normal.   Nursing note and vitals reviewed.        Assessment/Plan   Jo Ann was seen today for follow-up, hypertension and cough.    Diagnoses and all orders for this visit:    Bronchitis  -     XR Chest PA & Lateral  -     Discontinue: levoFLOXacin (LEVAQUIN) 500 MG tablet; Take 1 tablet by mouth Daily.  -     albuterol sulfate  (90 Base) MCG/ACT inhaler; Inhale 2 puffs Every 4 (Four) Hours As Needed for Wheezing.  -     Spacer/Aero Chamber Mouthpiece misc; 1 each 1 (One) Time for 1 dose.  -     guaiFENesin (MUCINEX) 600 MG 12 hr tablet; Take 2 tablets by mouth 2 (Two) Times a Day.    Weight loss  -     amitriptyline (ELAVIL) 25 MG tablet; Take 1 tablet by mouth Every Night.    Essential hypertension  -     amLODIPine (NORVASC) 5 MG tablet; Take 1 tablet by mouth Daily.    Dyslipidemia  -     rosuvastatin (CRESTOR) 20 MG tablet; Take 1 tablet by mouth Daily.    Patient's Body mass index is 24.71 kg/m². BMI is within normal parameters. No follow-up required..  X-ray following office visit.  Plenty of fluids.  Finish all antibiotics.  Albuterol inhaler with spacer given to help with wheezing symptoms.  Mucinex BID for congestion symptoms.  Continue all other current medications.  Follow up in 6 weeks for routine follow up.  Follow up sooner for problems/concerns.  Patient verbalized understanding and agreement with plan of care.        This document has been electronically signed by RODNEY David on February 26, 2019 9:05 PM

## 2019-04-30 ENCOUNTER — TELEPHONE (OUTPATIENT)
Dept: GASTROENTEROLOGY | Facility: CLINIC | Age: 59
End: 2019-04-30

## 2019-04-30 NOTE — TELEPHONE ENCOUNTER
----- Message from Vaibhav Olsen PA-C sent at 4/30/2019  4:08 PM CDT -----  Can you pull last note? I cant.  ----- Message -----  From: Purnima Espinoza MA  Sent: 4/30/2019   2:25 PM  To: Vaibhav Olsen PA-C    Seen and scoped by Dr. Brewer 04/2018 last seen by you in 2016.   ----- Message -----  From: Mai Saenz MA  Sent: 4/30/2019   2:06 PM  To: Purnima Espinoza MA    Patient is former patient of Vaibhav, went to see Dr Brewer. Patient wants to know how long she would have to wait  Before Vaibhav would see her again?

## 2019-04-30 NOTE — TELEPHONE ENCOUNTER
A voicemail has been left for the patient to make her aware that per Vaibhav Olsen MS, PA-C if she provides a copy of her records from Dr. Brewer for him to review he may consider seeing her in office.

## 2019-08-16 ENCOUNTER — OFFICE VISIT (OUTPATIENT)
Dept: FAMILY MEDICINE CLINIC | Facility: CLINIC | Age: 59
End: 2019-08-16

## 2019-08-16 VITALS
DIASTOLIC BLOOD PRESSURE: 70 MMHG | HEART RATE: 89 BPM | TEMPERATURE: 98.1 F | HEIGHT: 63 IN | BODY MASS INDEX: 24.52 KG/M2 | OXYGEN SATURATION: 97 % | RESPIRATION RATE: 20 BRPM | WEIGHT: 138.4 LBS | SYSTOLIC BLOOD PRESSURE: 122 MMHG

## 2019-08-16 DIAGNOSIS — Z13.820 SCREENING FOR OSTEOPOROSIS: ICD-10-CM

## 2019-08-16 DIAGNOSIS — Z13.1 SCREENING FOR DIABETES MELLITUS: ICD-10-CM

## 2019-08-16 DIAGNOSIS — E55.9 VITAMIN D DEFICIENCY: Chronic | ICD-10-CM

## 2019-08-16 DIAGNOSIS — E78.00 HYPERCHOLESTEREMIA: ICD-10-CM

## 2019-08-16 DIAGNOSIS — Z12.31 ENCOUNTER FOR SCREENING MAMMOGRAM FOR BREAST CANCER: ICD-10-CM

## 2019-08-16 DIAGNOSIS — Z13.29 SCREENING FOR THYROID DISORDER: ICD-10-CM

## 2019-08-16 DIAGNOSIS — Z00.00 WELL ADULT EXAM: Primary | ICD-10-CM

## 2019-08-16 DIAGNOSIS — I10 ESSENTIAL HYPERTENSION: Chronic | ICD-10-CM

## 2019-08-16 PROCEDURE — 99214 OFFICE O/P EST MOD 30 MIN: CPT | Performed by: NURSE PRACTITIONER

## 2019-09-03 NOTE — PROGRESS NOTES
Subjective   Jo Ann Lacy is a 58 y.o. female.     She presents today for her routine follow up on chronic medical problems.  Her BP is well controlled on her current medication without side effects.  She is tolerating her cholesterol medication without side effects.  She reports that she has been doing well since her last office visit with me.  She is due for routine fasting labs.  She is also due for screening mammogram and bone density.  She is without any verbalize complaints today in the office.      Hypertension   This is a chronic problem. The current episode started more than 1 year ago. The problem has been resolved since onset. The problem is controlled. Associated symptoms include malaise/fatigue. Pertinent negatives include no anxiety, blurred vision, chest pain, headaches, neck pain, orthopnea, palpitations, peripheral edema, PND, shortness of breath or sweats. There are no associated agents to hypertension. Risk factors for coronary artery disease include family history, dyslipidemia, post-menopausal state, sedentary lifestyle, stress and smoking/tobacco exposure. Past treatments include calcium channel blockers. Current antihypertension treatment includes calcium channel blockers. The current treatment provides significant improvement. Compliance problems include diet and exercise.  There is no history of chronic renal disease.   Hyperlipidemia   This is a chronic problem. The current episode started more than 1 year ago. She has no history of chronic renal disease, diabetes, hypothyroidism, liver disease, obesity or nephrotic syndrome. There are no known factors aggravating her hyperlipidemia. Pertinent negatives include no chest pain, focal sensory loss, leg pain or shortness of breath. Current antihyperlipidemic treatment includes fibric acid derivatives. Compliance problems include adherence to diet, adherence to exercise and psychosocial issues.  Risk factors for coronary artery disease include  dyslipidemia, family history, hypertension, post-menopausal, a sedentary lifestyle and stress.        The following portions of the patient's history were reviewed and updated as appropriate: allergies, current medications, past family history, past medical history, past social history, past surgical history and problem list.    Review of Systems   Constitutional: Positive for malaise/fatigue and unexpected weight loss. Negative for diaphoresis.   HENT: Negative.  Negative for swollen glands.    Eyes: Negative.  Negative for blurred vision.   Respiratory: Negative.  Negative for shortness of breath.    Cardiovascular: Negative.  Negative for chest pain, palpitations, orthopnea and PND.   Gastrointestinal: Negative.    Musculoskeletal: Negative.  Negative for neck pain.   Skin: Negative.    Allergic/Immunologic: Negative.    Neurological: Negative.    Hematological: Negative.    Psychiatric/Behavioral: Negative.        Objective   Physical Exam   Constitutional: She is oriented to person, place, and time. Vital signs are normal. She appears well-developed and well-nourished. No distress.   HENT:   Head: Normocephalic.   Right Ear: External ear normal.   Left Ear: External ear normal.   Nose: Nose normal.   Mouth/Throat: Oropharynx is clear and moist. No oropharyngeal exudate.   Eyes: Conjunctivae and EOM are normal. Pupils are equal, round, and reactive to light. Right eye exhibits no discharge. Left eye exhibits no discharge.   Neck: Normal range of motion. Neck supple. No tracheal deviation present. No thyromegaly present.   Cardiovascular: Normal rate, regular rhythm and normal heart sounds. Exam reveals no gallop and no friction rub.   No murmur heard.  Pulmonary/Chest: Effort normal and breath sounds normal. No respiratory distress. She has no wheezes. She has no rales. She exhibits no tenderness.   Musculoskeletal: Normal range of motion.   Lymphadenopathy:     She has no cervical adenopathy.   Neurological:  She is alert and oriented to person, place, and time.   Skin: Skin is warm and dry. Capillary refill takes less than 2 seconds. No rash noted. She is not diaphoretic. No erythema. No pallor.   Psychiatric: She has a normal mood and affect. Her behavior is normal. Judgment and thought content normal.   Nursing note and vitals reviewed.        Assessment/Plan   Jo Ann was seen today for follow-up, hypertension and annual exam.    Diagnoses and all orders for this visit:    Well adult exam  -     CBC & Differential  -     Comprehensive Metabolic Panel  -     Hemoglobin A1c  -     Lipid Panel  -     TSH  -     Vitamin D 25 Hydroxy  -     Microalbumin / Creatinine Urine Ratio - Urine, Clean Catch  -     DEXA Bone Density Axial; Future  -     Mammo Screening Bilateral With CAD; Future    Essential hypertension  -     CBC & Differential  -     Comprehensive Metabolic Panel  -     Hemoglobin A1c  -     Lipid Panel  -     TSH  -     Vitamin D 25 Hydroxy  -     Microalbumin / Creatinine Urine Ratio - Urine, Clean Catch  -     DEXA Bone Density Axial; Future  -     Mammo Screening Bilateral With CAD; Future    Hypercholesteremia  -     Lipid Panel    Vitamin D deficiency  -     Vitamin D 25 Hydroxy    Encounter for screening mammogram for breast cancer  -     Mammo Screening Bilateral With CAD; Future    Screening for osteoporosis  -     DEXA Bone Density Axial; Future    Screening for diabetes mellitus  -     Hemoglobin A1c    Screening for thyroid disorder  -     TSH               Patient's Body mass index is 24.52 kg/m². BMI is within normal parameters. No follow-up required..    Fasting labs.  Scheduled for mammogram and bone density.  Continue current medications.  Follow up in 1 year for routine follow up.  Follow up sooner for problems/concerns.  Patient verbalized understanding and agreement with plan of care.        This document has been electronically signed by RODNEY David on September 2, 2019 10:19 PM

## 2019-12-11 DIAGNOSIS — Z00.00 WELL ADULT EXAM: ICD-10-CM

## 2019-12-11 DIAGNOSIS — I10 ESSENTIAL HYPERTENSION: Chronic | ICD-10-CM

## 2019-12-11 DIAGNOSIS — Z12.31 ENCOUNTER FOR SCREENING MAMMOGRAM FOR BREAST CANCER: ICD-10-CM

## 2019-12-13 ENCOUNTER — TELEPHONE (OUTPATIENT)
Dept: FAMILY MEDICINE CLINIC | Facility: CLINIC | Age: 59
End: 2019-12-13

## 2019-12-16 DIAGNOSIS — I10 ESSENTIAL HYPERTENSION: Chronic | ICD-10-CM

## 2019-12-16 DIAGNOSIS — Z13.820 SCREENING FOR OSTEOPOROSIS: ICD-10-CM

## 2019-12-16 DIAGNOSIS — Z00.00 WELL ADULT EXAM: ICD-10-CM

## 2020-02-25 ENCOUNTER — OFFICE VISIT (OUTPATIENT)
Dept: FAMILY MEDICINE CLINIC | Facility: CLINIC | Age: 60
End: 2020-02-25

## 2020-02-25 VITALS
BODY MASS INDEX: 23.9 KG/M2 | RESPIRATION RATE: 20 BRPM | DIASTOLIC BLOOD PRESSURE: 80 MMHG | TEMPERATURE: 98.1 F | HEART RATE: 87 BPM | SYSTOLIC BLOOD PRESSURE: 120 MMHG | HEIGHT: 63 IN | WEIGHT: 134.9 LBS | OXYGEN SATURATION: 95 %

## 2020-02-25 DIAGNOSIS — F17.210 NICOTINE DEPENDENCE, CIGARETTES, UNCOMPLICATED: ICD-10-CM

## 2020-02-25 DIAGNOSIS — R63.4 WEIGHT LOSS: ICD-10-CM

## 2020-02-25 DIAGNOSIS — E55.9 VITAMIN D DEFICIENCY: Chronic | ICD-10-CM

## 2020-02-25 DIAGNOSIS — I10 ESSENTIAL HYPERTENSION: Primary | Chronic | ICD-10-CM

## 2020-02-25 DIAGNOSIS — Z71.6 TOBACCO ABUSE COUNSELING: ICD-10-CM

## 2020-02-25 DIAGNOSIS — Z12.2 ENCOUNTER FOR SCREENING FOR LUNG CANCER: ICD-10-CM

## 2020-02-25 DIAGNOSIS — E78.5 DYSLIPIDEMIA: ICD-10-CM

## 2020-02-25 PROCEDURE — 99214 OFFICE O/P EST MOD 30 MIN: CPT | Performed by: NURSE PRACTITIONER

## 2020-02-25 RX ORDER — AMITRIPTYLINE HYDROCHLORIDE 25 MG/1
25 TABLET, FILM COATED ORAL NIGHTLY
Qty: 90 TABLET | Refills: 1 | Status: SHIPPED | OUTPATIENT
Start: 2020-02-25 | End: 2020-02-25 | Stop reason: SDUPTHER

## 2020-02-25 RX ORDER — ROSUVASTATIN CALCIUM 20 MG/1
20 TABLET, COATED ORAL DAILY
Qty: 90 TABLET | Refills: 1 | Status: SHIPPED | OUTPATIENT
Start: 2020-02-25 | End: 2020-02-25 | Stop reason: SDUPTHER

## 2020-02-25 RX ORDER — AMLODIPINE BESYLATE 5 MG/1
5 TABLET ORAL DAILY
Qty: 90 TABLET | Refills: 3 | Status: SHIPPED | OUTPATIENT
Start: 2020-02-25 | End: 2020-08-25 | Stop reason: SDUPTHER

## 2020-02-25 RX ORDER — ROSUVASTATIN CALCIUM 20 MG/1
20 TABLET, COATED ORAL DAILY
Qty: 90 TABLET | Refills: 1 | Status: SHIPPED | OUTPATIENT
Start: 2020-02-25 | End: 2020-08-25 | Stop reason: SDUPTHER

## 2020-02-25 RX ORDER — AMLODIPINE BESYLATE 5 MG/1
5 TABLET ORAL DAILY
Qty: 90 TABLET | Refills: 3 | Status: SHIPPED | OUTPATIENT
Start: 2020-02-25 | End: 2020-02-25 | Stop reason: SDUPTHER

## 2020-02-25 RX ORDER — AMITRIPTYLINE HYDROCHLORIDE 25 MG/1
25 TABLET, FILM COATED ORAL NIGHTLY
Qty: 90 TABLET | Refills: 1 | Status: SHIPPED | OUTPATIENT
Start: 2020-02-25 | End: 2020-08-25 | Stop reason: SDUPTHER

## 2020-02-25 NOTE — PROGRESS NOTES
Subjective   Jo Ann Lacy is a 59 y.o. female.     She presents today for her routine follow up on chronic medical problems.  Her BP is well controlled on her current medication without side effects.  She is tolerating her cholesterol medication without side effects.  She reports that she has been doing well since her last office visit with me.  She is due for routine fasting labs.  She has not yet had these completed.  She reports that her job continues to be stressful.  She is planning to retire this year.  She does have a 45-pack-year history of smoking.  She is open to lung cancer screening.  She is otherwise without any other new complaints today in the office.      Hypertension   This is a chronic problem. The current episode started more than 1 year ago. The problem has been resolved since onset. The problem is controlled. Associated symptoms include malaise/fatigue. Pertinent negatives include no anxiety, blurred vision, chest pain, headaches, neck pain, orthopnea, palpitations, peripheral edema, PND, shortness of breath or sweats. There are no associated agents to hypertension. Risk factors for coronary artery disease include family history, dyslipidemia, post-menopausal state, sedentary lifestyle, stress and smoking/tobacco exposure. Past treatments include calcium channel blockers. Current antihypertension treatment includes calcium channel blockers. The current treatment provides significant improvement. Compliance problems include diet and exercise.  There is no history of chronic renal disease.   Hyperlipidemia   This is a chronic problem. The current episode started more than 1 year ago. She has no history of chronic renal disease, diabetes, hypothyroidism, liver disease, obesity or nephrotic syndrome. There are no known factors aggravating her hyperlipidemia. Pertinent negatives include no chest pain, focal sensory loss, leg pain or shortness of breath. Current antihyperlipidemic treatment includes  fibric acid derivatives. Compliance problems include adherence to diet, adherence to exercise and psychosocial issues.  Risk factors for coronary artery disease include dyslipidemia, family history, hypertension, post-menopausal, a sedentary lifestyle and stress.        The following portions of the patient's history were reviewed and updated as appropriate: allergies, current medications, past family history, past medical history, past social history, past surgical history and problem list.    Review of Systems   Constitutional: Positive for fatigue and malaise/fatigue.   HENT: Negative.    Eyes: Negative.  Negative for blurred vision.   Respiratory: Negative.  Negative for shortness of breath.    Cardiovascular: Negative.  Negative for chest pain, palpitations, orthopnea and PND.   Gastrointestinal: Negative.    Musculoskeletal: Negative.  Negative for neck pain.   Skin: Negative.    Allergic/Immunologic: Negative.    Neurological: Negative.    Hematological: Negative.    Psychiatric/Behavioral: Negative.        Objective   Physical Exam   Constitutional: She is oriented to person, place, and time. Vital signs are normal. She appears well-developed and well-nourished. No distress.   HENT:   Head: Normocephalic.   Right Ear: External ear normal.   Left Ear: External ear normal.   Nose: Nose normal.   Mouth/Throat: Oropharynx is clear and moist. No oropharyngeal exudate.   Eyes: Pupils are equal, round, and reactive to light. Conjunctivae and EOM are normal. Right eye exhibits no discharge. Left eye exhibits no discharge.   Neck: Normal range of motion. Neck supple. No tracheal deviation present. No thyromegaly present.   Cardiovascular: Normal rate, regular rhythm and normal heart sounds. Exam reveals no gallop and no friction rub.   No murmur heard.  Pulmonary/Chest: Effort normal and breath sounds normal. No respiratory distress. She has no wheezes. She has no rales. She exhibits no tenderness.   Musculoskeletal:  Normal range of motion.   Lymphadenopathy:     She has no cervical adenopathy.   Neurological: She is alert and oriented to person, place, and time.   Skin: Skin is warm and dry. Capillary refill takes less than 2 seconds. No rash noted. She is not diaphoretic. No erythema. No pallor.   Psychiatric: She has a normal mood and affect. Her behavior is normal. Judgment and thought content normal.   Nursing note and vitals reviewed.        Assessment/Plan   Jo Ann was seen today for follow-up and hypertension.    Diagnoses and all orders for this visit:    Essential hypertension  -     Discontinue: amLODIPine (NORVASC) 5 MG tablet; Take 1 tablet by mouth Daily.  -     amLODIPine (NORVASC) 5 MG tablet; Take 1 tablet by mouth Daily.    Weight loss  -     Discontinue: amitriptyline (ELAVIL) 25 MG tablet; Take 1 tablet by mouth Every Night.  -     amitriptyline (ELAVIL) 25 MG tablet; Take 1 tablet by mouth Every Night.    Dyslipidemia  -     Discontinue: rosuvastatin (CRESTOR) 20 MG tablet; Take 1 tablet by mouth Daily.  -     rosuvastatin (CRESTOR) 20 MG tablet; Take 1 tablet by mouth Daily.    Vitamin D deficiency    Encounter for screening for lung cancer  -     CT chest low dose wo; Future    Tobacco abuse counseling  -     CT chest low dose wo; Future    Nicotine dependence, cigarettes, uncomplicated  -     CT chest low dose wo; Future               Patient's Body mass index is 23.9 kg/m². BMI is within normal parameters. No follow-up required..    Schedule for low-dose CT scan for lung cancer screening.  Follow-up as soon as possible for fasting labs.  Continue current medications.  Follow up in 6 months for routine follow up.  Follow up sooner for problems/concerns.  Patient verbalized understanding and agreement with plan of care.        This document has been electronically signed by RODNEY David on February 25, 2020 12:35 PM     Low-Dose Lung Cancer CT Screening Visit    CHIEF COMPLAINT:    Shared Decision  "Making  I am discussing tobacco cessation today with Jo Ann Lacy    SMOKING HISTORY:     Social History     Tobacco Use   Smoking Status Current Every Day Smoker   • Packs/day: 1.00   • Years: 45.00   • Pack years: 45.00   • Types: Cigarettes   • Start date: 1975   Smokeless Tobacco Never Used       SUBJECTIVE:     Jo Ann Lacy is currently smoking 1 pack(s) per day with a  45 pack year history.  Reports no use of alternate forms of tobacco, electronic cigarettes, marijuana or other substances.  Based on the recommendation of the [place] Preventive Services Task Force, this patient is at high risk for lung cancer and a low-dose CT screening scan is recommended.     The patient has had no hemoptysis, unintentional weight loss or increasing shortness of breath. The patient is asymptomatic and has no signs or symptoms of lung cancer.     Together we discussed the potential benefits and potential harms of being screened for lung cancer including the potential for follow up diagnostic testing, risk for over diagnosis, false positive rate and radiation exposure using the [facility]: Is Lung Cancer Screening Right for Me Decision Aid (Publication 07-[id number], web site www.ahrq.gov). A copy of this decision aid resource has been provided in the after visit summary.  We also reviewed the patient's smoking history and counseled her on the importance and health benefits of stopping the use of tobacco products.      OBJECTIVE:    /80 (Patient Position: Sitting, Cuff Size: Adult)   Pulse 87   Temp 98.1 °F (36.7 °C) (Oral)   Resp 20   Ht 160 cm (63\")   Wt 61.2 kg (134 lb 14.4 oz)   LMP 07/10/2017   SpO2 95%   BMI 23.90 kg/m²   General: no distress, alert and oriented  Chest: Lung sounds are clear to auscultation, no wheezes, rales or rhonchi, with symmetric air entry. No respiratory distress  Cardiovascular: RRR with no murmur auscultated      Smoking Cessation discussion:     We discussed that there are a " number of resources and interventions to assist with smoking cessation if needed in the future including the 1-800-Quit Now line.(Included in the decision aid shared with the patient today).   On a scale of zero to ten, the patient rates their motivation to quit at a 5 out of 10 today.  Referral to stop smoking class has been offered. Medication options for tobacco cessation have been discussed with the patient.     Recommendations for continued lung cancer screening:      We discussed the NCCN guidelines for lung cancer screening and the patient verbalized understanding that annual screening is recommended until fifteen years beyond smoking as long as they have no other disease or comorbidity that would prevent them from receiving cancer treatments such as surgery should a lung cancer be detected.  After review of the NCCN guidelines and recommendations for ongoing screening, the patient verbalized understanding of recommendations for follow-up.  The patient has decided to proceed with a Low Dose Lung Cancer Screening CT today.      3 minutes face-to-face spent counseling patient on the continued health benefits of having quit tobacco, maintaining smoking abstinence, smoking cessation strategies and resources, as well as the importance of adherence to annual lung cancer low-dose CT screening.

## 2020-02-25 NOTE — PATIENT INSTRUCTIONS
"Hypertension, Adult  High blood pressure (hypertension) is when the force of blood pumping through the arteries is too strong. The arteries are the blood vessels that carry blood from the heart throughout the body. Hypertension forces the heart to work harder to pump blood and may cause arteries to become narrow or stiff. Untreated or uncontrolled hypertension can cause a heart attack, heart failure, a stroke, kidney disease, and other problems.  A blood pressure reading consists of a higher number over a lower number. Ideally, your blood pressure should be below 120/80. The first (\"top\") number is called the systolic pressure. It is a measure of the pressure in your arteries as your heart beats. The second (\"bottom\") number is called the diastolic pressure. It is a measure of the pressure in your arteries as the heart relaxes.  What are the causes?  The exact cause of this condition is not known. There are some conditions that result in or are related to high blood pressure.  What increases the risk?  Some risk factors for high blood pressure are under your control. The following factors may make you more likely to develop this condition:  · Smoking.  · Having type 2 diabetes mellitus, high cholesterol, or both.  · Not getting enough exercise or physical activity.  · Being overweight.  · Having too much fat, sugar, calories, or salt (sodium) in your diet.  · Drinking too much alcohol.  Some risk factors for high blood pressure may be difficult or impossible to change. Some of these factors include:  · Having chronic kidney disease.  · Having a family history of high blood pressure.  · Age. Risk increases with age.  · Race. You may be at higher risk if you are .  · Gender. Men are at higher risk than women before age 45. After age 65, women are at higher risk than men.  · Having obstructive sleep apnea.  · Stress.  What are the signs or symptoms?  High blood pressure may not cause symptoms. Very high " blood pressure (hypertensive crisis) may cause:  · Headache.  · Anxiety.  · Shortness of breath.  · Nosebleed.  · Nausea and vomiting.  · Vision changes.  · Severe chest pain.  · Seizures.  How is this diagnosed?  This condition is diagnosed by measuring your blood pressure while you are seated, with your arm resting on a flat surface, your legs uncrossed, and your feet flat on the floor. The cuff of the blood pressure monitor will be placed directly against the skin of your upper arm at the level of your heart. It should be measured at least twice using the same arm. Certain conditions can cause a difference in blood pressure between your right and left arms.  Certain factors can cause blood pressure readings to be lower or higher than normal for a short period of time:  · When your blood pressure is higher when you are in a health care provider's office than when you are at home, this is called white coat hypertension. Most people with this condition do not need medicines.  · When your blood pressure is higher at home than when you are in a health care provider's office, this is called masked hypertension. Most people with this condition may need medicines to control blood pressure.  If you have a high blood pressure reading during one visit or you have normal blood pressure with other risk factors, you may be asked to:  · Return on a different day to have your blood pressure checked again.  · Monitor your blood pressure at home for 1 week or longer.  If you are diagnosed with hypertension, you may have other blood or imaging tests to help your health care provider understand your overall risk for other conditions.  How is this treated?  This condition is treated by making healthy lifestyle changes, such as eating healthy foods, exercising more, and reducing your alcohol intake. Your health care provider may prescribe medicine if lifestyle changes are not enough to get your blood pressure under control, and  if:  · Your systolic blood pressure is above 130.  · Your diastolic blood pressure is above 80.  Your personal target blood pressure may vary depending on your medical conditions, your age, and other factors.  Follow these instructions at home:  Eating and drinking    · Eat a diet that is high in fiber and potassium, and low in sodium, added sugar, and fat. An example eating plan is called the DASH (Dietary Approaches to Stop Hypertension) diet. To eat this way:  ? Eat plenty of fresh fruits and vegetables. Try to fill one half of your plate at each meal with fruits and vegetables.  ? Eat whole grains, such as whole-wheat pasta, brown rice, or whole-grain bread. Fill about one fourth of your plate with whole grains.  ? Eat or drink low-fat dairy products, such as skim milk or low-fat yogurt.  ? Avoid fatty cuts of meat, processed or cured meats, and poultry with skin. Fill about one fourth of your plate with lean proteins, such as fish, chicken without skin, beans, eggs, or tofu.  ? Avoid pre-made and processed foods. These tend to be higher in sodium, added sugar, and fat.  · Reduce your daily sodium intake. Most people with hypertension should eat less than 1,500 mg of sodium a day.  · Do not drink alcohol if:  ? Your health care provider tells you not to drink.  ? You are pregnant, may be pregnant, or are planning to become pregnant.  · If you drink alcohol:  ? Limit how much you use to:  § 0-1 drink a day for women.  § 0-2 drinks a day for men.  ? Be aware of how much alcohol is in your drink. In the [place] one drink equals one 12 oz bottle of beer (355 mL), one 5 oz glass of wine (148 mL), or one 1½ oz glass of hard liquor (44 mL).  Lifestyle    · Work with your health care provider to maintain a healthy body weight or to lose weight. Ask what an ideal weight is for you.  · Get at least 30 minutes of exercise most days of the week. Activities may include walking, swimming, or biking.  · Include exercise to  strengthen your muscles (resistance exercise), such as Pilates or lifting weights, as part of your weekly exercise routine. Try to do these types of exercises for 30 minutes at least 3 days a week.  · Do not use any products that contain nicotine or tobacco, such as cigarettes, e-cigarettes, and chewing tobacco. If you need help quitting, ask your health care provider.  · Monitor your blood pressure at home as told by your health care provider.  · Keep all follow-up visits as told by your health care provider. This is important.  Medicines  · Take over-the-counter and prescription medicines only as told by your health care provider. Follow directions carefully. Blood pressure medicines must be taken as prescribed.  · Do not skip doses of blood pressure medicine. Doing this puts you at risk for problems and can make the medicine less effective.  · Ask your health care provider about side effects or reactions to medicines that you should watch for.  Contact a health care provider if you:  · Think you are having a reaction to a medicine you are taking.  · Have headaches that keep coming back (recurring).  · Feel dizzy.  · Have swelling in your ankles.  · Have trouble with your vision.  Get help right away if you:  · Develop a severe headache or confusion.  · Have unusual weakness or numbness.  · Feel faint.  · Have severe pain in your chest or abdomen.  · Vomit repeatedly.  · Have trouble breathing.  Summary  · Hypertension is when the force of blood pumping through your arteries is too strong. If this condition is not controlled, it may put you at risk for serious complications.  · Your personal target blood pressure may vary depending on your medical conditions, your age, and other factors. For most people, a normal blood pressure is less than 120/80.  · Hypertension is treated with lifestyle changes, medicines, or a combination of both. Lifestyle changes include losing weight, eating a healthy, low-sodium diet,  "exercising more, and limiting alcohol.  This information is not intended to replace advice given to you by your health care provider. Make sure you discuss any questions you have with your health care provider.  Document Released: 12/18/2006 Document Revised: 08/28/2019 Document Reviewed: 08/28/2019  [employer] Patient Education © 2020 [employer]      Managing Your Hypertension  Hypertension is commonly called high blood pressure. This is when the force of your blood pressing against the walls of your arteries is too strong. Arteries are blood vessels that carry blood from your heart throughout your body. Hypertension forces the heart to work harder to pump blood, and may cause the arteries to become narrow or stiff. Having untreated or uncontrolled hypertension can cause heart attack, stroke, kidney disease, and other problems.  What are blood pressure readings?  A blood pressure reading consists of a higher number over a lower number. Ideally, your blood pressure should be below 120/80. The first (\"top\") number is called the systolic pressure. It is a measure of the pressure in your arteries as your heart beats. The second (\"bottom\") number is called the diastolic pressure. It is a measure of the pressure in your arteries as the heart relaxes.  What does my blood pressure reading mean?  Blood pressure is classified into four stages. Based on your blood pressure reading, your health care provider may use the following stages to determine what type of treatment you need, if any. Systolic pressure and diastolic pressure are measured in a unit called mm Hg.  Normal  · Systolic pressure: below 120.  · Diastolic pressure: below 80.  Elevated  · Systolic pressure: 120-129.  · Diastolic pressure: below 80.  Hypertension stage 1  · Systolic pressure: 130-139.  · Diastolic pressure: 80-89.  Hypertension stage 2  · Systolic pressure: 140 or above.  · Diastolic pressure: 90 or above.  What health risks are " associated with hypertension?  Managing your hypertension is an important responsibility. Uncontrolled hypertension can lead to:  · A heart attack.  · A stroke.  · A weakened blood vessel (aneurysm).  · Heart failure.  · Kidney damage.  · Eye damage.  · Metabolic syndrome.  · Memory and concentration problems.  What changes can I make to manage my hypertension?  Hypertension can be managed by making lifestyle changes and possibly by taking medicines. Your health care provider will help you make a plan to bring your blood pressure within a normal range.  Eating and drinking    · Eat a diet that is high in fiber and potassium, and low in salt (sodium), added sugar, and fat. An example eating plan is called the DASH (Dietary Approaches to Stop Hypertension) diet. To eat this way:  ? Eat plenty of fresh fruits and vegetables. Try to fill half of your plate at each meal with fruits and vegetables.  ? Eat whole grains, such as whole wheat pasta, brown rice, or whole grain bread. Fill about one quarter of your plate with whole grains.  ? Eat low-fat diary products.  ? Avoid fatty cuts of meat, processed or cured meats, and poultry with skin. Fill about one quarter of your plate with lean proteins such as fish, chicken without skin, beans, eggs, and tofu.  ? Avoid premade and processed foods. These tend to be higher in sodium, added sugar, and fat.  · Reduce your daily sodium intake. Most people with hypertension should eat less than 1,500 mg of sodium a day.  · Limit alcohol intake to no more than 1 drink a day for nonpregnant women and 2 drinks a day for men. One drink equals 12 oz of beer, 5 oz of wine, or 1½ oz of hard liquor.  Lifestyle  · Work with your health care provider to maintain a healthy body weight, or to lose weight. Ask what an ideal weight is for you.  · Get at least 30 minutes of exercise that causes your heart to beat faster (aerobic exercise) most days of the week. Activities may include walking,  swimming, or biking.  · Include exercise to strengthen your muscles (resistance exercise), such as weight lifting, as part of your weekly exercise routine. Try to do these types of exercises for 30 minutes at least 3 days a week.  · Do not use any products that contain nicotine or tobacco, such as cigarettes and e-cigarettes. If you need help quitting, ask your health care provider.  · Control any long-term (chronic) conditions you have, such as high cholesterol or diabetes.  Monitoring  · Monitor your blood pressure at home as told by your health care provider. Your personal target blood pressure may vary depending on your medical conditions, your age, and other factors.  · Have your blood pressure checked regularly, as often as told by your health care provider.  Working with your health care provider  · Review all the medicines you take with your health care provider because there may be side effects or interactions.  · Talk with your health care provider about your diet, exercise habits, and other lifestyle factors that may be contributing to hypertension.  · Visit your health care provider regularly. Your health care provider can help you create and adjust your plan for managing hypertension.  Will I need medicine to control my blood pressure?  Your health care provider may prescribe medicine if lifestyle changes are not enough to get your blood pressure under control, and if:  · Your systolic blood pressure is 130 or higher.  · Your diastolic blood pressure is 80 or higher.  Take medicines only as told by your health care provider. Follow the directions carefully. Blood pressure medicines must be taken as prescribed. The medicine does not work as well when you skip doses. Skipping doses also puts you at risk for problems.  Contact a health care provider if:  · You think you are having a reaction to medicines you have taken.  · You have repeated (recurrent) headaches.  · You feel dizzy.  · You have swelling in  your ankles.  · You have trouble with your vision.  Get help right away if:  · You develop a severe headache or confusion.  · You have unusual weakness or numbness, or you feel faint.  · You have severe pain in your chest or abdomen.  · You vomit repeatedly.  · You have trouble breathing.  Summary  · Hypertension is when the force of blood pumping through your arteries is too strong. If this condition is not controlled, it may put you at risk for serious complications.  · Your personal target blood pressure may vary depending on your medical conditions, your age, and other factors. For most people, a normal blood pressure is less than 120/80.  · Hypertension is managed by lifestyle changes, medicines, or both. Lifestyle changes include weight loss, eating a healthy, low-sodium diet, exercising more, and limiting alcohol.  This information is not intended to replace advice given to you by your health care provider. Make sure you discuss any questions you have with your health care provider.  Document Released: 09/11/2013 Document Revised: 11/15/2017 Document Reviewed: 11/15/2017  [employer] Interactive Patient Education © 2020 [employer]      Please review the decision aid used during our discussion regarding the Low dose lung cancer screening visit today.

## 2020-03-18 ENCOUNTER — TELEPHONE (OUTPATIENT)
Dept: GENERAL RADIOLOGY | Facility: HOSPITAL | Age: 60
End: 2020-03-18

## 2020-03-18 NOTE — TELEPHONE ENCOUNTER
Patient was a no show for appointment On 03/18/2020 at  12:30am for a ct chest low dose wo contrast

## 2020-08-25 ENCOUNTER — OFFICE VISIT (OUTPATIENT)
Dept: FAMILY MEDICINE CLINIC | Facility: CLINIC | Age: 60
End: 2020-08-25

## 2020-08-25 ENCOUNTER — LAB (OUTPATIENT)
Dept: LAB | Facility: HOSPITAL | Age: 60
End: 2020-08-25

## 2020-08-25 VITALS
DIASTOLIC BLOOD PRESSURE: 80 MMHG | HEIGHT: 63 IN | TEMPERATURE: 98.2 F | OXYGEN SATURATION: 98 % | RESPIRATION RATE: 20 BRPM | SYSTOLIC BLOOD PRESSURE: 118 MMHG | HEART RATE: 98 BPM | WEIGHT: 140.6 LBS | BODY MASS INDEX: 24.91 KG/M2

## 2020-08-25 DIAGNOSIS — Z13.29 SCREENING FOR THYROID DISORDER: ICD-10-CM

## 2020-08-25 DIAGNOSIS — E78.5 DYSLIPIDEMIA: ICD-10-CM

## 2020-08-25 DIAGNOSIS — Z13.1 SCREENING FOR DIABETES MELLITUS: ICD-10-CM

## 2020-08-25 DIAGNOSIS — Z00.00 WELL ADULT EXAM: Primary | ICD-10-CM

## 2020-08-25 DIAGNOSIS — R63.4 WEIGHT LOSS: ICD-10-CM

## 2020-08-25 DIAGNOSIS — I10 ESSENTIAL HYPERTENSION: ICD-10-CM

## 2020-08-25 LAB
25(OH)D3 SERPL-MCNC: 12 NG/ML (ref 30–100)
ALBUMIN SERPL-MCNC: 4.2 G/DL (ref 3.5–5.2)
ALBUMIN UR-MCNC: <1.2 MG/DL
ALBUMIN/GLOB SERPL: 1.4 G/DL
ALP SERPL-CCNC: 67 U/L (ref 39–117)
ALT SERPL W P-5'-P-CCNC: 11 U/L (ref 1–33)
ANION GAP SERPL CALCULATED.3IONS-SCNC: 10.7 MMOL/L (ref 5–15)
AST SERPL-CCNC: 16 U/L (ref 1–32)
BASOPHILS # BLD AUTO: 0.04 10*3/MM3 (ref 0–0.2)
BASOPHILS NFR BLD AUTO: 0.6 % (ref 0–1.5)
BILIRUB SERPL-MCNC: 0.2 MG/DL (ref 0–1.2)
BUN SERPL-MCNC: 16 MG/DL (ref 8–23)
BUN/CREAT SERPL: 25.8 (ref 7–25)
CALCIUM SPEC-SCNC: 9.7 MG/DL (ref 8.6–10.5)
CHLORIDE SERPL-SCNC: 108 MMOL/L (ref 98–107)
CHOLEST SERPL-MCNC: 202 MG/DL (ref 0–200)
CO2 SERPL-SCNC: 21.3 MMOL/L (ref 22–29)
CREAT SERPL-MCNC: 0.62 MG/DL (ref 0.57–1)
CREAT UR-MCNC: 119.1 MG/DL
DEPRECATED RDW RBC AUTO: 49.6 FL (ref 37–54)
EOSINOPHIL # BLD AUTO: 0.18 10*3/MM3 (ref 0–0.4)
EOSINOPHIL NFR BLD AUTO: 2.7 % (ref 0.3–6.2)
ERYTHROCYTE [DISTWIDTH] IN BLOOD BY AUTOMATED COUNT: 14 % (ref 12.3–15.4)
GFR SERPL CREATININE-BSD FRML MDRD: 119 ML/MIN/1.73
GLOBULIN UR ELPH-MCNC: 2.9 GM/DL
GLUCOSE SERPL-MCNC: 77 MG/DL (ref 65–99)
HBA1C MFR BLD: 5.6 % (ref 4.8–5.6)
HCT VFR BLD AUTO: 37.9 % (ref 34–46.6)
HDLC SERPL-MCNC: 51 MG/DL (ref 40–60)
HGB BLD-MCNC: 12.8 G/DL (ref 12–15.9)
IMM GRANULOCYTES # BLD AUTO: 0.01 10*3/MM3 (ref 0–0.05)
IMM GRANULOCYTES NFR BLD AUTO: 0.2 % (ref 0–0.5)
LDLC SERPL CALC-MCNC: 126 MG/DL (ref 0–100)
LDLC/HDLC SERPL: 2.47 {RATIO}
LYMPHOCYTES # BLD AUTO: 2.34 10*3/MM3 (ref 0.7–3.1)
LYMPHOCYTES NFR BLD AUTO: 35.6 % (ref 19.6–45.3)
MCH RBC QN AUTO: 32.2 PG (ref 26.6–33)
MCHC RBC AUTO-ENTMCNC: 33.8 G/DL (ref 31.5–35.7)
MCV RBC AUTO: 95.5 FL (ref 79–97)
MICROALBUMIN/CREAT UR: NORMAL MG/G{CREAT}
MONOCYTES # BLD AUTO: 0.4 10*3/MM3 (ref 0.1–0.9)
MONOCYTES NFR BLD AUTO: 6.1 % (ref 5–12)
NEUTROPHILS NFR BLD AUTO: 3.6 10*3/MM3 (ref 1.7–7)
NEUTROPHILS NFR BLD AUTO: 54.8 % (ref 42.7–76)
NRBC BLD AUTO-RTO: 0 /100 WBC (ref 0–0.2)
PLATELET # BLD AUTO: 310 10*3/MM3 (ref 140–450)
PMV BLD AUTO: 10.5 FL (ref 6–12)
POTASSIUM SERPL-SCNC: 3.5 MMOL/L (ref 3.5–5.2)
PROT SERPL-MCNC: 7.1 G/DL (ref 6–8.5)
RBC # BLD AUTO: 3.97 10*6/MM3 (ref 3.77–5.28)
SODIUM SERPL-SCNC: 140 MMOL/L (ref 136–145)
TRIGL SERPL-MCNC: 126 MG/DL (ref 0–150)
TSH SERPL DL<=0.05 MIU/L-ACNC: 1.19 UIU/ML (ref 0.27–4.2)
VLDLC SERPL-MCNC: 25.2 MG/DL (ref 5–40)
WBC # BLD AUTO: 6.57 10*3/MM3 (ref 3.4–10.8)

## 2020-08-25 PROCEDURE — 83036 HEMOGLOBIN GLYCOSYLATED A1C: CPT | Performed by: NURSE PRACTITIONER

## 2020-08-25 PROCEDURE — 80053 COMPREHEN METABOLIC PANEL: CPT | Performed by: NURSE PRACTITIONER

## 2020-08-25 PROCEDURE — 82043 UR ALBUMIN QUANTITATIVE: CPT | Performed by: NURSE PRACTITIONER

## 2020-08-25 PROCEDURE — 84443 ASSAY THYROID STIM HORMONE: CPT | Performed by: NURSE PRACTITIONER

## 2020-08-25 PROCEDURE — 82570 ASSAY OF URINE CREATININE: CPT | Performed by: NURSE PRACTITIONER

## 2020-08-25 PROCEDURE — 85025 COMPLETE CBC W/AUTO DIFF WBC: CPT | Performed by: NURSE PRACTITIONER

## 2020-08-25 PROCEDURE — 82306 VITAMIN D 25 HYDROXY: CPT | Performed by: NURSE PRACTITIONER

## 2020-08-25 PROCEDURE — 99213 OFFICE O/P EST LOW 20 MIN: CPT | Performed by: NURSE PRACTITIONER

## 2020-08-25 PROCEDURE — 80061 LIPID PANEL: CPT | Performed by: NURSE PRACTITIONER

## 2020-08-25 RX ORDER — AMITRIPTYLINE HYDROCHLORIDE 25 MG/1
25 TABLET, FILM COATED ORAL NIGHTLY
Qty: 90 TABLET | Refills: 1 | Status: SHIPPED | OUTPATIENT
Start: 2020-08-25 | End: 2021-02-23 | Stop reason: SDUPTHER

## 2020-08-25 RX ORDER — AMLODIPINE BESYLATE 5 MG/1
5 TABLET ORAL DAILY
Qty: 90 TABLET | Refills: 1 | Status: SHIPPED | OUTPATIENT
Start: 2020-08-25 | End: 2021-02-23 | Stop reason: SDUPTHER

## 2020-08-25 RX ORDER — ROSUVASTATIN CALCIUM 20 MG/1
20 TABLET, COATED ORAL DAILY
Qty: 90 TABLET | Refills: 1 | Status: SHIPPED | OUTPATIENT
Start: 2020-08-25 | End: 2021-02-23 | Stop reason: SDUPTHER

## 2020-08-27 ENCOUNTER — TELEPHONE (OUTPATIENT)
Dept: FAMILY MEDICINE CLINIC | Facility: CLINIC | Age: 60
End: 2020-08-27

## 2020-08-27 NOTE — TELEPHONE ENCOUNTER
----- Message from RODNEY David sent at 8/26/2020 10:52 AM CDT -----  Her cholesterol remains slightly elevated.  Make sure that she is taking her Crestor daily.  If she is, we may need to discuss increasing this dosage.  Vitamin D is low.  Recommend that she start taking vitamin D3 5000 IU over-the-counter once daily.  Otherwise her labs look pretty good.

## 2020-08-28 ENCOUNTER — TELEPHONE (OUTPATIENT)
Dept: FAMILY MEDICINE CLINIC | Facility: CLINIC | Age: 60
End: 2020-08-28

## 2020-09-14 NOTE — PROGRESS NOTES
Subjective   Jo Ann Lacy is a 59 y.o. female.     She presents today for her routine follow up on chronic medical problems.  Her BP is well controlled on her current medication without side effects.  She is tolerating her cholesterol medication without side effects.  She reports that she has been doing well since her last office visit with me.  She is due for routine fasting labs.  She has never had these completed.  She reports that her job continues to be stressful.  She is due for routine screening mammogram and bone density.  She is without any new complaints today in the office.    Hypertension  This is a chronic problem. The current episode started more than 1 year ago. The problem has been resolved since onset. The problem is controlled. Associated symptoms include malaise/fatigue. Pertinent negatives include no anxiety, blurred vision, chest pain, headaches, neck pain, orthopnea, palpitations, peripheral edema, PND, shortness of breath or sweats. There are no associated agents to hypertension. Risk factors for coronary artery disease include family history, dyslipidemia, post-menopausal state, sedentary lifestyle, stress and smoking/tobacco exposure. Past treatments include calcium channel blockers. Current antihypertension treatment includes calcium channel blockers. The current treatment provides significant improvement. Compliance problems include diet and exercise.  There is no history of chronic renal disease.   Hyperlipidemia  This is a chronic problem. The current episode started more than 1 year ago. She has no history of chronic renal disease, diabetes, hypothyroidism, liver disease, obesity or nephrotic syndrome. There are no known factors aggravating her hyperlipidemia. Pertinent negatives include no chest pain, focal sensory loss, leg pain or shortness of breath. Current antihyperlipidemic treatment includes fibric acid derivatives. Compliance problems include adherence to diet, adherence to  exercise and psychosocial issues.  Risk factors for coronary artery disease include dyslipidemia, family history, hypertension, post-menopausal, a sedentary lifestyle and stress.        The following portions of the patient's history were reviewed and updated as appropriate: allergies, current medications, past family history, past medical history, past social history, past surgical history and problem list.    Review of Systems   Constitutional: Positive for fatigue and malaise/fatigue.   HENT: Negative.    Eyes: Negative.  Negative for blurred vision.   Respiratory: Negative.  Negative for shortness of breath.    Cardiovascular: Negative.  Negative for chest pain, palpitations, orthopnea and PND.   Gastrointestinal: Negative.    Musculoskeletal: Negative.  Negative for neck pain.   Skin: Negative.    Allergic/Immunologic: Negative.    Neurological: Negative.    Hematological: Negative.    Psychiatric/Behavioral: Negative.        Objective   Physical Exam  Vitals signs reviewed.   Constitutional:       General: She is not in acute distress.     Appearance: She is well-developed. She is not diaphoretic.   HENT:      Head: Normocephalic.      Right Ear: External ear normal.      Left Ear: External ear normal.      Nose: Nose normal.   Eyes:      Pupils: Pupils are equal, round, and reactive to light.   Neck:      Musculoskeletal: Normal range of motion and neck supple.      Thyroid: No thyromegaly.      Vascular: No JVD.   Cardiovascular:      Rate and Rhythm: Normal rate and regular rhythm.      Heart sounds: No murmur. No friction rub. No gallop.    Pulmonary:      Effort: Pulmonary effort is normal. No respiratory distress.      Breath sounds: Normal breath sounds. No wheezing or rales.   Musculoskeletal: Normal range of motion.   Skin:     General: Skin is warm and dry.      Coloration: Skin is not pale.      Findings: No erythema or rash.   Neurological:      Mental Status: She is alert and oriented to person,  place, and time.   Psychiatric:         Behavior: Behavior normal.         Thought Content: Thought content normal.         Judgment: Judgment normal.           Assessment/Plan   Jo Ann was seen today for follow-up and hypertension.    Diagnoses and all orders for this visit:    Well adult exam  -     CBC & Differential  -     Comprehensive Metabolic Panel  -     Hemoglobin A1c  -     Lipid Panel  -     TSH  -     Vitamin D 25 Hydroxy  -     Microalbumin / Creatinine Urine Ratio - Urine, Clean Catch  -     CBC Auto Differential    Weight loss  -     amitriptyline (ELAVIL) 25 MG tablet; Take 1 tablet by mouth Every Night.  -     CBC & Differential  -     Comprehensive Metabolic Panel  -     Hemoglobin A1c  -     Lipid Panel  -     TSH  -     Vitamin D 25 Hydroxy  -     Microalbumin / Creatinine Urine Ratio - Urine, Clean Catch  -     CBC Auto Differential    Essential hypertension  -     amLODIPine (NORVASC) 5 MG tablet; Take 1 tablet by mouth Daily.  -     CBC & Differential  -     Comprehensive Metabolic Panel  -     Hemoglobin A1c  -     Lipid Panel  -     TSH  -     Vitamin D 25 Hydroxy  -     Microalbumin / Creatinine Urine Ratio - Urine, Clean Catch  -     CBC Auto Differential    Dyslipidemia  -     rosuvastatin (Crestor) 20 MG tablet; Take 1 tablet by mouth Daily.  -     CBC & Differential  -     Comprehensive Metabolic Panel  -     Hemoglobin A1c  -     Lipid Panel  -     TSH  -     Vitamin D 25 Hydroxy  -     Microalbumin / Creatinine Urine Ratio - Urine, Clean Catch  -     CBC Auto Differential    Screening for diabetes mellitus  -     CBC & Differential  -     Comprehensive Metabolic Panel  -     Hemoglobin A1c  -     Lipid Panel  -     TSH  -     Vitamin D 25 Hydroxy  -     Microalbumin / Creatinine Urine Ratio - Urine, Clean Catch  -     CBC Auto Differential    Screening for thyroid disorder  -     CBC & Differential  -     Comprehensive Metabolic Panel  -     Hemoglobin A1c  -     Lipid Panel  -      TSH  -     Vitamin D 25 Hydroxy  -     Microalbumin / Creatinine Urine Ratio - Urine, Clean Catch  -     CBC Auto Differential               Patient's Body mass index is 24.91 kg/m². BMI is within normal parameters. No follow-up required..    Fasting labs.  Continue current medications.  Follow up in 6 months for routine follow up.  Follow up sooner for problems/concerns.  Patient verbalized understanding and agreement with plan of care.        This document has been electronically signed by RODNEY David on September 13, 2020 20:27 CDT

## 2021-02-22 ENCOUNTER — TELEPHONE (OUTPATIENT)
Dept: FAMILY MEDICINE CLINIC | Facility: CLINIC | Age: 61
End: 2021-02-22

## 2021-02-22 NOTE — TELEPHONE ENCOUNTER
Tried calling to confirm appointment remind to wear a mask and prescreen   No answer left voicemail [facility] to read

## 2021-02-23 ENCOUNTER — OFFICE VISIT (OUTPATIENT)
Dept: FAMILY MEDICINE CLINIC | Facility: CLINIC | Age: 61
End: 2021-02-23

## 2021-02-23 VITALS
SYSTOLIC BLOOD PRESSURE: 120 MMHG | OXYGEN SATURATION: 95 % | HEART RATE: 91 BPM | BODY MASS INDEX: 24.31 KG/M2 | RESPIRATION RATE: 20 BRPM | DIASTOLIC BLOOD PRESSURE: 80 MMHG | TEMPERATURE: 96.9 F | WEIGHT: 137.2 LBS | HEIGHT: 63 IN

## 2021-02-23 DIAGNOSIS — E78.00 HYPERCHOLESTEREMIA: ICD-10-CM

## 2021-02-23 DIAGNOSIS — E78.5 DYSLIPIDEMIA: ICD-10-CM

## 2021-02-23 DIAGNOSIS — L67.8 ABNORMAL TEXTURE OF HAIR: ICD-10-CM

## 2021-02-23 DIAGNOSIS — R63.4 WEIGHT LOSS: ICD-10-CM

## 2021-02-23 DIAGNOSIS — I10 ESSENTIAL HYPERTENSION: Primary | Chronic | ICD-10-CM

## 2021-02-23 DIAGNOSIS — Z12.31 ENCOUNTER FOR SCREENING MAMMOGRAM FOR BREAST CANCER: ICD-10-CM

## 2021-02-23 DIAGNOSIS — E55.9 VITAMIN D DEFICIENCY: Chronic | ICD-10-CM

## 2021-02-23 PROCEDURE — 99214 OFFICE O/P EST MOD 30 MIN: CPT | Performed by: NURSE PRACTITIONER

## 2021-02-23 RX ORDER — ROSUVASTATIN CALCIUM 20 MG/1
20 TABLET, COATED ORAL DAILY
Qty: 90 TABLET | Refills: 1 | Status: SHIPPED | OUTPATIENT
Start: 2021-02-23 | End: 2021-08-24 | Stop reason: SDUPTHER

## 2021-02-23 RX ORDER — AMITRIPTYLINE HYDROCHLORIDE 25 MG/1
25 TABLET, FILM COATED ORAL NIGHTLY
Qty: 90 TABLET | Refills: 1 | Status: SHIPPED | OUTPATIENT
Start: 2021-02-23 | End: 2021-08-24 | Stop reason: SDUPTHER

## 2021-02-23 RX ORDER — AMLODIPINE BESYLATE 5 MG/1
5 TABLET ORAL DAILY
Qty: 90 TABLET | Refills: 1 | Status: SHIPPED | OUTPATIENT
Start: 2021-02-23 | End: 2021-08-24 | Stop reason: SDUPTHER

## 2021-02-23 NOTE — PROGRESS NOTES
Subjective   Jo Ann Lacy is a 60 y.o. female.     She presents today for her routine follow up on chronic medical problems.  Her BP is well controlled on her current medication without side effects.  She reports that she has difficulty remembering to take her cholesterol medication, but would like to get this refilled so she can get back started taking it regularly.  She reports that she has been doing OK since her last office visit with me.  She is due for repeat fasting labs.  She reports that her job continues to be stressful.  She is working 12 hours shifts seven days a week.  She is due for her screening mammogram.  She is without any new complaints today in the office other than fatigue symptoms.    Hypertension  This is a chronic problem. The current episode started more than 1 year ago. The problem has been resolved since onset. The problem is controlled. Associated symptoms include malaise/fatigue. Pertinent negatives include no anxiety, blurred vision, chest pain, headaches, neck pain, orthopnea, palpitations, peripheral edema, PND, shortness of breath or sweats. There are no associated agents to hypertension. Risk factors for coronary artery disease include family history, dyslipidemia, post-menopausal state, sedentary lifestyle, stress and smoking/tobacco exposure. Past treatments include calcium channel blockers. Current antihypertension treatment includes calcium channel blockers. The current treatment provides significant improvement. Compliance problems include diet and exercise.  There is no history of chronic renal disease.   Hyperlipidemia  This is a chronic problem. The current episode started more than 1 year ago. She has no history of chronic renal disease, diabetes, hypothyroidism, liver disease, obesity or nephrotic syndrome. There are no known factors aggravating her hyperlipidemia. Pertinent negatives include no chest pain, focal sensory loss, leg pain or shortness of breath. Current  antihyperlipidemic treatment includes fibric acid derivatives. Compliance problems include adherence to diet, adherence to exercise and psychosocial issues.  Risk factors for coronary artery disease include dyslipidemia, family history, hypertension, post-menopausal, a sedentary lifestyle and stress.        The following portions of the patient's history were reviewed and updated as appropriate: allergies, current medications, past family history, past medical history, past social history, past surgical history and problem list.    Review of Systems   Constitutional: Positive for fatigue and malaise/fatigue.   HENT: Negative.    Eyes: Negative.  Negative for blurred vision.   Respiratory: Negative.  Negative for shortness of breath.    Cardiovascular: Negative.  Negative for chest pain, palpitations, orthopnea and PND.   Gastrointestinal: Negative.    Musculoskeletal: Negative.  Negative for neck pain.   Skin: Negative.    Allergic/Immunologic: Negative.    Neurological: Negative.    Hematological: Negative.    Psychiatric/Behavioral: Negative.        Objective   Physical Exam  Vitals signs reviewed.   Constitutional:       General: She is not in acute distress.     Appearance: She is well-developed. She is not diaphoretic.   HENT:      Head: Normocephalic.      Right Ear: External ear normal.      Left Ear: External ear normal.      Nose: Nose normal.   Eyes:      Pupils: Pupils are equal, round, and reactive to light.   Neck:      Musculoskeletal: Normal range of motion and neck supple.      Thyroid: No thyromegaly.      Vascular: No JVD.   Cardiovascular:      Rate and Rhythm: Normal rate and regular rhythm.      Heart sounds: No murmur. No friction rub. No gallop.    Pulmonary:      Effort: Pulmonary effort is normal. No respiratory distress.      Breath sounds: Normal breath sounds. No wheezing or rales.   Musculoskeletal: Normal range of motion.   Skin:     General: Skin is warm and dry.      Coloration: Skin  is not pale.      Findings: No erythema or rash.   Neurological:      Mental Status: She is alert and oriented to person, place, and time.   Psychiatric:         Behavior: Behavior normal.         Thought Content: Thought content normal.         Judgment: Judgment normal.           Assessment/Plan   Diagnoses and all orders for this visit:    1. Essential hypertension (Primary)  -     Comprehensive Metabolic Panel  -     Lipid Panel  -     Vitamin D 25 Hydroxy  -     amLODIPine (NORVASC) 5 MG tablet; Take 1 tablet by mouth Daily.  Dispense: 90 tablet; Refill: 1    2. Hypercholesteremia  -     Comprehensive Metabolic Panel  -     Lipid Panel  -     Vitamin D 25 Hydroxy    3. Vitamin D deficiency  -     Comprehensive Metabolic Panel  -     Lipid Panel  -     Vitamin D 25 Hydroxy    4. Encounter for screening mammogram for breast cancer  -     Mammo Screening Bilateral With CAD; Future    5. Weight loss  -     amitriptyline (ELAVIL) 25 MG tablet; Take 1 tablet by mouth Every Night.  Dispense: 90 tablet; Refill: 1    6. Dyslipidemia  -     rosuvastatin (Crestor) 20 MG tablet; Take 1 tablet by mouth Daily.  Dispense: 90 tablet; Refill: 1    7. Abnormal texture of hair  -     T3, Free  -     T4, Free  -     TSH               Patient's Body mass index is 24.3 kg/m². BMI is within normal parameters. No follow-up required..    Fasting labs.  Schedule for screening mammogram.  Continue current medications.  Follow up in 6 months for routine follow up.  Follow up sooner for problems/concerns.  Patient verbalized understanding and agreement with plan of care.        This document has been electronically signed by RODNEY David on February 23, 2021 11:01 CST

## 2021-02-23 NOTE — PATIENT INSTRUCTIONS
"Hypertension, Adult  High blood pressure (hypertension) is when the force of blood pumping through the arteries is too strong. The arteries are the blood vessels that carry blood from the heart throughout the body. Hypertension forces the heart to work harder to pump blood and may cause arteries to become narrow or stiff. Untreated or uncontrolled hypertension can cause a heart attack, heart failure, a stroke, kidney disease, and other problems.  A blood pressure reading consists of a higher number over a lower number. Ideally, your blood pressure should be below 120/80. The first (\"top\") number is called the systolic pressure. It is a measure of the pressure in your arteries as your heart beats. The second (\"bottom\") number is called the diastolic pressure. It is a measure of the pressure in your arteries as the heart relaxes.  What are the causes?  The exact cause of this condition is not known. There are some conditions that result in or are related to high blood pressure.  What increases the risk?  Some risk factors for high blood pressure are under your control. The following factors may make you more likely to develop this condition:  · Smoking.  · Having type 2 diabetes mellitus, high cholesterol, or both.  · Not getting enough exercise or physical activity.  · Being overweight.  · Having too much fat, sugar, calories, or salt (sodium) in your diet.  · Drinking too much alcohol.  Some risk factors for high blood pressure may be difficult or impossible to change. Some of these factors include:  · Having chronic kidney disease.  · Having a family history of high blood pressure.  · Age. Risk increases with age.  · Race. You may be at higher risk if you are .  · Gender. Men are at higher risk than women before age 45. After age 65, women are at higher risk than men.  · Having obstructive sleep apnea.  · Stress.  What are the signs or symptoms?  High blood pressure may not cause symptoms. Very high " blood pressure (hypertensive crisis) may cause:  · Headache.  · Anxiety.  · Shortness of breath.  · Nosebleed.  · Nausea and vomiting.  · Vision changes.  · Severe chest pain.  · Seizures.  How is this diagnosed?  This condition is diagnosed by measuring your blood pressure while you are seated, with your arm resting on a flat surface, your legs uncrossed, and your feet flat on the floor. The cuff of the blood pressure monitor will be placed directly against the skin of your upper arm at the level of your heart. It should be measured at least twice using the same arm. Certain conditions can cause a difference in blood pressure between your right and left arms.  Certain factors can cause blood pressure readings to be lower or higher than normal for a short period of time:  · When your blood pressure is higher when you are in a health care provider's office than when you are at home, this is called white coat hypertension. Most people with this condition do not need medicines.  · When your blood pressure is higher at home than when you are in a health care provider's office, this is called masked hypertension. Most people with this condition may need medicines to control blood pressure.  If you have a high blood pressure reading during one visit or you have normal blood pressure with other risk factors, you may be asked to:  · Return on a different day to have your blood pressure checked again.  · Monitor your blood pressure at home for 1 week or longer.  If you are diagnosed with hypertension, you may have other blood or imaging tests to help your health care provider understand your overall risk for other conditions.  How is this treated?  This condition is treated by making healthy lifestyle changes, such as eating healthy foods, exercising more, and reducing your alcohol intake. Your health care provider may prescribe medicine if lifestyle changes are not enough to get your blood pressure under control, and  if:  · Your systolic blood pressure is above 130.  · Your diastolic blood pressure is above 80.  Your personal target blood pressure may vary depending on your medical conditions, your age, and other factors.  Follow these instructions at home:  Eating and drinking    · Eat a diet that is high in fiber and potassium, and low in sodium, added sugar, and fat. An example eating plan is called the DASH (Dietary Approaches to Stop Hypertension) diet. To eat this way:  ? Eat plenty of fresh fruits and vegetables. Try to fill one half of your plate at each meal with fruits and vegetables.  ? Eat whole grains, such as whole-wheat pasta, brown rice, or whole-grain bread. Fill about one fourth of your plate with whole grains.  ? Eat or drink low-fat dairy products, such as skim milk or low-fat yogurt.  ? Avoid fatty cuts of meat, processed or cured meats, and poultry with skin. Fill about one fourth of your plate with lean proteins, such as fish, chicken without skin, beans, eggs, or tofu.  ? Avoid pre-made and processed foods. These tend to be higher in sodium, added sugar, and fat.  · Reduce your daily sodium intake. Most people with hypertension should eat less than 1,500 mg of sodium a day.  · Do not drink alcohol if:  ? Your health care provider tells you not to drink.  ? You are pregnant, may be pregnant, or are planning to become pregnant.  · If you drink alcohol:  ? Limit how much you use to:  § 0-1 drink a day for women.  § 0-2 drinks a day for men.  ? Be aware of how much alcohol is in your drink. In the [place] one drink equals one 12 oz bottle of beer (355 mL), one 5 oz glass of wine (148 mL), or one 1½ oz glass of hard liquor (44 mL).  Lifestyle    · Work with your health care provider to maintain a healthy body weight or to lose weight. Ask what an ideal weight is for you.  · Get at least 30 minutes of exercise most days of the week. Activities may include walking, swimming, or biking.  · Include exercise to  strengthen your muscles (resistance exercise), such as Pilates or lifting weights, as part of your weekly exercise routine. Try to do these types of exercises for 30 minutes at least 3 days a week.  · Do not use any products that contain nicotine or tobacco, such as cigarettes, e-cigarettes, and chewing tobacco. If you need help quitting, ask your health care provider.  · Monitor your blood pressure at home as told by your health care provider.  · Keep all follow-up visits as told by your health care provider. This is important.  Medicines  · Take over-the-counter and prescription medicines only as told by your health care provider. Follow directions carefully. Blood pressure medicines must be taken as prescribed.  · Do not skip doses of blood pressure medicine. Doing this puts you at risk for problems and can make the medicine less effective.  · Ask your health care provider about side effects or reactions to medicines that you should watch for.  Contact a health care provider if you:  · Think you are having a reaction to a medicine you are taking.  · Have headaches that keep coming back (recurring).  · Feel dizzy.  · Have swelling in your ankles.  · Have trouble with your vision.  Get help right away if you:  · Develop a severe headache or confusion.  · Have unusual weakness or numbness.  · Feel faint.  · Have severe pain in your chest or abdomen.  · Vomit repeatedly.  · Have trouble breathing.  Summary  · Hypertension is when the force of blood pumping through your arteries is too strong. If this condition is not controlled, it may put you at risk for serious complications.  · Your personal target blood pressure may vary depending on your medical conditions, your age, and other factors. For most people, a normal blood pressure is less than 120/80.  · Hypertension is treated with lifestyle changes, medicines, or a combination of both. Lifestyle changes include losing weight, eating a healthy, low-sodium diet,  "exercising more, and limiting alcohol.  This information is not intended to replace advice given to you by your health care provider. Make sure you discuss any questions you have with your health care provider.  Document Revised: 08/28/2019 Document Reviewed: 08/28/2019  [employer]vier Patient Education © 2020 [employer]      Managing Your Hypertension  Hypertension is commonly called high blood pressure. This is when the force of your blood pressing against the walls of your arteries is too strong. Arteries are blood vessels that carry blood from your heart throughout your body. Hypertension forces the heart to work harder to pump blood, and may cause the arteries to become narrow or stiff. Having untreated or uncontrolled hypertension can cause heart attack, stroke, kidney disease, and other problems.  What are blood pressure readings?  A blood pressure reading consists of a higher number over a lower number. Ideally, your blood pressure should be below 120/80. The first (\"top\") number is called the systolic pressure. It is a measure of the pressure in your arteries as your heart beats. The second (\"bottom\") number is called the diastolic pressure. It is a measure of the pressure in your arteries as the heart relaxes.  What does my blood pressure reading mean?  Blood pressure is classified into four stages. Based on your blood pressure reading, your health care provider may use the following stages to determine what type of treatment you need, if any. Systolic pressure and diastolic pressure are measured in a unit called mm Hg.  Normal  · Systolic pressure: below 120.  · Diastolic pressure: below 80.  Elevated  · Systolic pressure: 120-129.  · Diastolic pressure: below 80.  Hypertension stage 1  · Systolic pressure: 130-139.  · Diastolic pressure: 80-89.  Hypertension stage 2  · Systolic pressure: 140 or above.  · Diastolic pressure: 90 or above.  What health risks are associated with hypertension?  Managing your " hypertension is an important responsibility. Uncontrolled hypertension can lead to:  · A heart attack.  · A stroke.  · A weakened blood vessel (aneurysm).  · Heart failure.  · Kidney damage.  · Eye damage.  · Metabolic syndrome.  · Memory and concentration problems.  What changes can I make to manage my hypertension?  Hypertension can be managed by making lifestyle changes and possibly by taking medicines. Your health care provider will help you make a plan to bring your blood pressure within a normal range.  Eating and drinking    · Eat a diet that is high in fiber and potassium, and low in salt (sodium), added sugar, and fat. An example eating plan is called the DASH (Dietary Approaches to Stop Hypertension) diet. To eat this way:  ? Eat plenty of fresh fruits and vegetables. Try to fill half of your plate at each meal with fruits and vegetables.  ? Eat whole grains, such as whole wheat pasta, brown rice, or whole grain bread. Fill about one quarter of your plate with whole grains.  ? Eat low-fat diary products.  ? Avoid fatty cuts of meat, processed or cured meats, and poultry with skin. Fill about one quarter of your plate with lean proteins such as fish, chicken without skin, beans, eggs, and tofu.  ? Avoid premade and processed foods. These tend to be higher in sodium, added sugar, and fat.  · Reduce your daily sodium intake. Most people with hypertension should eat less than 1,500 mg of sodium a day.  · Limit alcohol intake to no more than 1 drink a day for nonpregnant women and 2 drinks a day for men. One drink equals 12 oz of beer, 5 oz of wine, or 1½ oz of hard liquor.  Lifestyle  · Work with your health care provider to maintain a healthy body weight, or to lose weight. Ask what an ideal weight is for you.  · Get at least 30 minutes of exercise that causes your heart to beat faster (aerobic exercise) most days of the week. Activities may include walking, swimming, or biking.  · Include exercise to  strengthen your muscles (resistance exercise), such as weight lifting, as part of your weekly exercise routine. Try to do these types of exercises for 30 minutes at least 3 days a week.  · Do not use any products that contain nicotine or tobacco, such as cigarettes and e-cigarettes. If you need help quitting, ask your health care provider.  · Control any long-term (chronic) conditions you have, such as high cholesterol or diabetes.  Monitoring  · Monitor your blood pressure at home as told by your health care provider. Your personal target blood pressure may vary depending on your medical conditions, your age, and other factors.  · Have your blood pressure checked regularly, as often as told by your health care provider.  Working with your health care provider  · Review all the medicines you take with your health care provider because there may be side effects or interactions.  · Talk with your health care provider about your diet, exercise habits, and other lifestyle factors that may be contributing to hypertension.  · Visit your health care provider regularly. Your health care provider can help you create and adjust your plan for managing hypertension.  Will I need medicine to control my blood pressure?  Your health care provider may prescribe medicine if lifestyle changes are not enough to get your blood pressure under control, and if:  · Your systolic blood pressure is 130 or higher.  · Your diastolic blood pressure is 80 or higher.  Take medicines only as told by your health care provider. Follow the directions carefully. Blood pressure medicines must be taken as prescribed. The medicine does not work as well when you skip doses. Skipping doses also puts you at risk for problems.  Contact a health care provider if:  · You think you are having a reaction to medicines you have taken.  · You have repeated (recurrent) headaches.  · You feel dizzy.  · You have swelling in your ankles.  · You have trouble with your  vision.  Get help right away if:  · You develop a severe headache or confusion.  · You have unusual weakness or numbness, or you feel faint.  · You have severe pain in your chest or abdomen.  · You vomit repeatedly.  · You have trouble breathing.  Summary  · Hypertension is when the force of blood pumping through your arteries is too strong. If this condition is not controlled, it may put you at risk for serious complications.  · Your personal target blood pressure may vary depending on your medical conditions, your age, and other factors. For most people, a normal blood pressure is less than 120/80.  · Hypertension is managed by lifestyle changes, medicines, or both. Lifestyle changes include weight loss, eating a healthy, low-sodium diet, exercising more, and limiting alcohol.  This information is not intended to replace advice given to you by your health care provider. Make sure you discuss any questions you have with your health care provider.  Document Revised: 04/10/2020 Document Reviewed: 11/15/2017  [employer] Patient Education © 2020 [employer]     Preventive Care 40-64 Years Old, Female  Preventive care refers to visits with your health care provider and lifestyle choices that can promote health and wellness. This includes:  · A yearly physical exam. This may also be called an annual well check.  · Regular dental visits and eye exams.  · Immunizations.  · Screening for certain conditions.  · Healthy lifestyle choices, such as eating a healthy diet, getting regular exercise, not using drugs or products that contain nicotine and tobacco, and limiting alcohol use.  What can I expect for my preventive care visit?  Physical exam  Your health care provider will check your:  · Height and weight. This may be used to calculate body mass index (BMI), which tells if you are at a healthy weight.  · Heart rate and blood pressure.  · Skin for abnormal spots.  Counseling  Your health care provider may ask you questions  about your:  · Alcohol, tobacco, and drug use.  · Emotional well-being.  · Home and relationship well-being.  · Sexual activity.  · Eating habits.  · Work and work environment.  · Method of birth control.  · Menstrual cycle.  · Pregnancy history.  What immunizations do I need?    Influenza (flu) vaccine  · This is recommended every year.  Tetanus, diphtheria, and pertussis (Tdap) vaccine  · You may need a Td booster every 10 years.  Varicella (chickenpox) vaccine  · You may need this if you have not been vaccinated.  Zoster (shingles) vaccine  · You may need this after age 60.  Measles, mumps, and rubella (MMR) vaccine  · You may need at least one dose of MMR if you were born in 1957 or later. You may also need a second dose.  Pneumococcal conjugate (PCV13) vaccine  · You may need this if you have certain conditions and were not previously vaccinated.  Pneumococcal polysaccharide (PPSV23) vaccine  · You may need one or two doses if you smoke cigarettes or if you have certain conditions.  Meningococcal conjugate (MenACWY) vaccine  · You may need this if you have certain conditions.  Hepatitis A vaccine  · You may need this if you have certain conditions or if you travel or work in places where you may be exposed to hepatitis A.  Hepatitis B vaccine  · You may need this if you have certain conditions or if you travel or work in places where you may be exposed to hepatitis B.  Haemophilus influenzae type b (Hib) vaccine  · You may need this if you have certain conditions.  Human papillomavirus (HPV) vaccine  · If recommended by your health care provider, you may need three doses over 6 months.  You may receive vaccines as individual doses or as more than one vaccine together in one shot (combination vaccines). Talk with your health care provider about the risks and benefits of combination vaccines.  What tests do I need?  Blood tests  · Lipid and cholesterol levels. These may be checked every 5 years, or more  frequently if you are over 50 years old.  · Hepatitis C test.  · Hepatitis B test.  Screening  · Lung cancer screening. You may have this screening every year starting at age 55 if you have a 30-pack-year history of smoking and currently smoke or have quit within the past 15 years.  · Colorectal cancer screening. All adults should have this screening starting at age 50 and continuing until age 75. Your health care provider may recommend screening at age 45 if you are at increased risk. You will have tests every 1-10 years, depending on your results and the type of screening test.  · Diabetes screening. This is done by checking your blood sugar (glucose) after you have not eaten for a while (fasting). You may have this done every 1-3 years.  · Mammogram. This may be done every 1-2 years. Talk with your health care provider about when you should start having regular mammograms. This may depend on whether you have a family history of breast cancer.  · BRCA-related cancer screening. This may be done if you have a family history of breast, ovarian, tubal, or peritoneal cancers.  · Pelvic exam and Pap test. This may be done every 3 years starting at age 21. Starting at age 30, this may be done every 5 years if you have a Pap test in combination with an HPV test.  Other tests  · Sexually transmitted disease (STD) testing.  · Bone density scan. This is done to screen for osteoporosis. You may have this scan if you are at high risk for osteoporosis.  Follow these instructions at home:  Eating and drinking  · Eat a diet that includes fresh fruits and vegetables, whole grains, lean protein, and low-fat dairy.  · Take vitamin and mineral supplements as recommended by your health care provider.  · Do not drink alcohol if:  ? Your health care provider tells you not to drink.  ? You are pregnant, may be pregnant, or are planning to become pregnant.  · If you drink alcohol:  ? Limit how much you have to 0-1 drink a day.  ? Be aware  of how much alcohol is in your drink. In the [place] one drink equals one 12 oz bottle of beer (355 mL), one 5 oz glass of wine (148 mL), or one 1½ oz glass of hard liquor (44 mL).  Lifestyle  · Take daily care of your teeth and gums.  · Stay active. Exercise for at least 30 minutes on 5 or more days each week.  · Do not use any products that contain nicotine or tobacco, such as cigarettes, e-cigarettes, and chewing tobacco. If you need help quitting, ask your health care provider.  · If you are sexually active, practice safe sex. Use a condom or other form of birth control (contraception) in order to prevent pregnancy and STIs (sexually transmitted infections).  · If told by your health care provider, take low-dose aspirin daily starting at age 50.  What's next?  · Visit your health care provider once a year for a well check visit.  · Ask your health care provider how often you should have your eyes and teeth checked.  · Stay up to date on all vaccines.  This information is not intended to replace advice given to you by your health care provider. Make sure you discuss any questions you have with your health care provider.  Document Revised: 08/29/2019 Document Reviewed: 08/29/2019  Elsevier Patient Education © 2020 [employer]

## 2021-04-01 DIAGNOSIS — Z12.31 ENCOUNTER FOR SCREENING MAMMOGRAM FOR BREAST CANCER: ICD-10-CM

## 2021-04-05 ENCOUNTER — TELEPHONE (OUTPATIENT)
Dept: FAMILY MEDICINE CLINIC | Facility: CLINIC | Age: 61
End: 2021-04-05

## 2021-04-05 NOTE — TELEPHONE ENCOUNTER
----- Message from RODNEY David sent at 4/1/2021  1:07 PM CDT -----  Normal mammogram.  Repeat in 1 year.

## 2021-08-23 ENCOUNTER — TELEPHONE (OUTPATIENT)
Dept: FAMILY MEDICINE CLINIC | Facility: CLINIC | Age: 61
End: 2021-08-23

## 2021-08-23 NOTE — TELEPHONE ENCOUNTER
Tried calling to confirm appointment and ask COVID screening questions.  No answer left detailed voice message    
muscle strength/gross motor/tone/decreased midline orientation/balance

## 2021-08-24 ENCOUNTER — OFFICE VISIT (OUTPATIENT)
Dept: FAMILY MEDICINE CLINIC | Facility: CLINIC | Age: 61
End: 2021-08-24

## 2021-08-24 ENCOUNTER — LAB (OUTPATIENT)
Dept: LAB | Facility: HOSPITAL | Age: 61
End: 2021-08-24

## 2021-08-24 VITALS
WEIGHT: 142.2 LBS | RESPIRATION RATE: 20 BRPM | BODY MASS INDEX: 25.2 KG/M2 | TEMPERATURE: 97.1 F | HEIGHT: 63 IN | SYSTOLIC BLOOD PRESSURE: 130 MMHG | HEART RATE: 85 BPM | DIASTOLIC BLOOD PRESSURE: 70 MMHG

## 2021-08-24 DIAGNOSIS — R63.4 WEIGHT LOSS: ICD-10-CM

## 2021-08-24 DIAGNOSIS — M25.562 CHRONIC PAIN OF BOTH KNEES: Primary | ICD-10-CM

## 2021-08-24 DIAGNOSIS — E78.5 DYSLIPIDEMIA: ICD-10-CM

## 2021-08-24 DIAGNOSIS — I10 ESSENTIAL HYPERTENSION: Chronic | ICD-10-CM

## 2021-08-24 DIAGNOSIS — R23.4 PEELING SKIN: ICD-10-CM

## 2021-08-24 DIAGNOSIS — G89.29 CHRONIC PAIN OF BOTH KNEES: Primary | ICD-10-CM

## 2021-08-24 DIAGNOSIS — M25.561 CHRONIC PAIN OF BOTH KNEES: Primary | ICD-10-CM

## 2021-08-24 PROCEDURE — 84443 ASSAY THYROID STIM HORMONE: CPT | Performed by: NURSE PRACTITIONER

## 2021-08-24 PROCEDURE — 84481 FREE ASSAY (FT-3): CPT | Performed by: NURSE PRACTITIONER

## 2021-08-24 PROCEDURE — 80061 LIPID PANEL: CPT | Performed by: NURSE PRACTITIONER

## 2021-08-24 PROCEDURE — 85025 COMPLETE CBC W/AUTO DIFF WBC: CPT | Performed by: NURSE PRACTITIONER

## 2021-08-24 PROCEDURE — 99214 OFFICE O/P EST MOD 30 MIN: CPT | Performed by: NURSE PRACTITIONER

## 2021-08-24 PROCEDURE — 80053 COMPREHEN METABOLIC PANEL: CPT | Performed by: NURSE PRACTITIONER

## 2021-08-24 PROCEDURE — 84439 ASSAY OF FREE THYROXINE: CPT | Performed by: NURSE PRACTITIONER

## 2021-08-24 PROCEDURE — 82306 VITAMIN D 25 HYDROXY: CPT | Performed by: NURSE PRACTITIONER

## 2021-08-24 RX ORDER — AMLODIPINE BESYLATE 5 MG/1
5 TABLET ORAL DAILY
Qty: 90 TABLET | Refills: 1 | Status: SHIPPED | OUTPATIENT
Start: 2021-08-24 | End: 2022-05-17 | Stop reason: SDDI

## 2021-08-24 RX ORDER — AMITRIPTYLINE HYDROCHLORIDE 25 MG/1
25 TABLET, FILM COATED ORAL NIGHTLY
Qty: 90 TABLET | Refills: 1 | Status: SHIPPED | OUTPATIENT
Start: 2021-08-24 | End: 2022-05-17 | Stop reason: SDDI

## 2021-08-24 RX ORDER — ROSUVASTATIN CALCIUM 20 MG/1
20 TABLET, COATED ORAL DAILY
Qty: 90 TABLET | Refills: 1 | Status: SHIPPED | OUTPATIENT
Start: 2021-08-24 | End: 2022-05-17 | Stop reason: SDDI

## 2021-08-25 LAB
25(OH)D3 SERPL-MCNC: 9.7 NG/ML
ALBUMIN SERPL-MCNC: 4.1 G/DL (ref 3.5–5.2)
ALBUMIN/GLOB SERPL: 1.4 G/DL
ALP SERPL-CCNC: 72 U/L (ref 39–117)
ALT SERPL W P-5'-P-CCNC: 11 U/L (ref 1–33)
ANION GAP SERPL CALCULATED.3IONS-SCNC: 8.8 MMOL/L (ref 5–15)
AST SERPL-CCNC: 15 U/L (ref 1–32)
BASOPHILS # BLD AUTO: 0.05 10*3/MM3 (ref 0–0.2)
BASOPHILS NFR BLD AUTO: 0.9 % (ref 0–1.5)
BILIRUB SERPL-MCNC: <0.2 MG/DL (ref 0–1.2)
BUN SERPL-MCNC: 22 MG/DL (ref 8–23)
BUN/CREAT SERPL: 28.9 (ref 7–25)
CALCIUM SPEC-SCNC: 8.8 MG/DL (ref 8.6–10.5)
CHLORIDE SERPL-SCNC: 106 MMOL/L (ref 98–107)
CHOLEST SERPL-MCNC: 199 MG/DL (ref 0–200)
CO2 SERPL-SCNC: 24.2 MMOL/L (ref 22–29)
CREAT SERPL-MCNC: 0.76 MG/DL (ref 0.57–1)
DEPRECATED RDW RBC AUTO: 47.2 FL (ref 37–54)
EOSINOPHIL # BLD AUTO: 0.11 10*3/MM3 (ref 0–0.4)
EOSINOPHIL NFR BLD AUTO: 2 % (ref 0.3–6.2)
ERYTHROCYTE [DISTWIDTH] IN BLOOD BY AUTOMATED COUNT: 13.1 % (ref 12.3–15.4)
GFR SERPL CREATININE-BSD FRML MDRD: 93 ML/MIN/1.73
GLOBULIN UR ELPH-MCNC: 3 GM/DL
GLUCOSE SERPL-MCNC: 94 MG/DL (ref 65–99)
HCT VFR BLD AUTO: 36.9 % (ref 34–46.6)
HDLC SERPL-MCNC: 50 MG/DL (ref 40–60)
HGB BLD-MCNC: 12 G/DL (ref 12–15.9)
IMM GRANULOCYTES # BLD AUTO: 0.02 10*3/MM3 (ref 0–0.05)
IMM GRANULOCYTES NFR BLD AUTO: 0.4 % (ref 0–0.5)
LDLC SERPL CALC-MCNC: 126 MG/DL (ref 0–100)
LDLC/HDLC SERPL: 2.46 {RATIO}
LYMPHOCYTES # BLD AUTO: 2.37 10*3/MM3 (ref 0.7–3.1)
LYMPHOCYTES NFR BLD AUTO: 42.5 % (ref 19.6–45.3)
MCH RBC QN AUTO: 31.8 PG (ref 26.6–33)
MCHC RBC AUTO-ENTMCNC: 32.5 G/DL (ref 31.5–35.7)
MCV RBC AUTO: 97.9 FL (ref 79–97)
MONOCYTES # BLD AUTO: 0.4 10*3/MM3 (ref 0.1–0.9)
MONOCYTES NFR BLD AUTO: 7.2 % (ref 5–12)
NEUTROPHILS NFR BLD AUTO: 2.63 10*3/MM3 (ref 1.7–7)
NEUTROPHILS NFR BLD AUTO: 47 % (ref 42.7–76)
NRBC BLD AUTO-RTO: 0 /100 WBC (ref 0–0.2)
PLATELET # BLD AUTO: 244 10*3/MM3 (ref 140–450)
PMV BLD AUTO: 11 FL (ref 6–12)
POTASSIUM SERPL-SCNC: 4.1 MMOL/L (ref 3.5–5.2)
PROT SERPL-MCNC: 7.1 G/DL (ref 6–8.5)
RBC # BLD AUTO: 3.77 10*6/MM3 (ref 3.77–5.28)
SODIUM SERPL-SCNC: 139 MMOL/L (ref 136–145)
T3FREE SERPL-MCNC: 2.95 PG/ML (ref 2–4.4)
T4 FREE SERPL-MCNC: 1.06 NG/DL (ref 0.93–1.7)
TRIGL SERPL-MCNC: 130 MG/DL (ref 0–150)
TSH SERPL DL<=0.05 MIU/L-ACNC: 0.71 UIU/ML (ref 0.27–4.2)
VLDLC SERPL-MCNC: 23 MG/DL (ref 5–40)
WBC # BLD AUTO: 5.58 10*3/MM3 (ref 3.4–10.8)

## 2021-08-26 DIAGNOSIS — E55.9 VITAMIN D DEFICIENCY: Primary | ICD-10-CM

## 2021-08-26 RX ORDER — ERGOCALCIFEROL 1.25 MG/1
50000 CAPSULE ORAL WEEKLY
Qty: 12 CAPSULE | Refills: 3 | Status: SHIPPED | OUTPATIENT
Start: 2021-08-26 | End: 2022-05-17 | Stop reason: SDDI

## 2021-08-30 ENCOUNTER — TELEPHONE (OUTPATIENT)
Dept: FAMILY MEDICINE CLINIC | Facility: CLINIC | Age: 61
End: 2021-08-30

## 2021-08-30 DIAGNOSIS — M17.0 PRIMARY OSTEOARTHRITIS OF BOTH KNEES: Primary | ICD-10-CM

## 2021-08-30 NOTE — TELEPHONE ENCOUNTER
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    I would recommend a referral to orthopedics if she is open to this.  Pt would like the ortho referral would like to see Lisy Stephenson here our clinic

## 2021-08-30 NOTE — TELEPHONE ENCOUNTER
----- Message from RODNEY David sent at 8/26/2021  3:23 PM CDT -----  Vitamin D is very low.  I am going to send in a once weekly vitamin D supplement.  Normal thyroid.  Normal electrolytes, kidney function, and liver function.  Cholesterol is not well controlled.  Find out if she has been taking her cholesteorl medication.    Pt stated she has cholesterol  medication Crestor 20 mg but has not picked it up yet from her pharmacy.  Pt stated that she will start taking it.

## 2021-08-30 NOTE — TELEPHONE ENCOUNTER
----- Message from RODNEY David sent at 8/26/2021  3:23 PM CDT -----  Vitamin D is very low.  I am going to send in a once weekly vitamin D supplement.  Normal thyroid.  Normal electrolytes, kidney function, and liver function.  Cholesterol is not well controlled.  Find out if she has been taking her cholesteorl medication.

## 2021-09-12 NOTE — PROGRESS NOTES
Subjective   Jo Ann Lacy is a 60 y.o. female.     She presents today for her routine follow up on chronic medical problems.  Her BP is fairly well controlled on her current medication without side effects.  She is taking her cholesterol medication without side effects.  She reports that she has been doing OK since her last office visit with me.  She is due for repeat labs.  She reports that her job continues to be stressful.  She is working 12 hours shifts seven days a week.  She has been struggling with chronic pain of her bilateral knees.  She also has noticed that the skin on her hands is peeling.  She believes this may be related to her employment.  She is otherwise without any other new complaints today in the office.    Hypertension  This is a chronic problem. The current episode started more than 1 year ago. The problem has been resolved since onset. The problem is controlled. Pertinent negatives include no anxiety, blurred vision, chest pain, headaches, neck pain, orthopnea, palpitations, peripheral edema, PND, shortness of breath or sweats. There are no associated agents to hypertension. Risk factors for coronary artery disease include family history, dyslipidemia, post-menopausal state, sedentary lifestyle, stress and smoking/tobacco exposure. Past treatments include calcium channel blockers. Current antihypertension treatment includes calcium channel blockers. The current treatment provides significant improvement. Compliance problems include diet and exercise.  There is no history of chronic renal disease.   Hyperlipidemia  This is a chronic problem. The current episode started more than 1 year ago. She has no history of chronic renal disease, diabetes, hypothyroidism, liver disease, obesity or nephrotic syndrome. There are no known factors aggravating her hyperlipidemia. Pertinent negatives include no chest pain, focal sensory loss, leg pain or shortness of breath. Current antihyperlipidemic  treatment includes fibric acid derivatives. Compliance problems include adherence to diet, adherence to exercise and psychosocial issues.  Risk factors for coronary artery disease include dyslipidemia, family history, hypertension, post-menopausal, a sedentary lifestyle and stress.        The following portions of the patient's history were reviewed and updated as appropriate: allergies, current medications, past family history, past medical history, past social history, past surgical history and problem list.    Review of Systems   Constitutional: Negative.    HENT: Negative.    Eyes: Negative.  Negative for blurred vision.   Respiratory: Negative.  Negative for shortness of breath.    Cardiovascular: Negative.  Negative for chest pain, palpitations, orthopnea and PND.   Gastrointestinal: Negative.    Musculoskeletal: Negative.  Negative for neck pain.   Skin: Positive for rash.   Allergic/Immunologic: Negative.    Neurological: Negative.    Hematological: Negative.    Psychiatric/Behavioral: Negative.        Objective   Physical Exam  Vitals reviewed.   Constitutional:       General: She is not in acute distress.     Appearance: She is well-developed. She is not diaphoretic.   HENT:      Head: Normocephalic.      Right Ear: External ear normal.      Left Ear: External ear normal.      Nose: Nose normal.   Eyes:      Pupils: Pupils are equal, round, and reactive to light.   Neck:      Thyroid: No thyromegaly.      Vascular: No JVD.   Cardiovascular:      Rate and Rhythm: Normal rate and regular rhythm.      Heart sounds: No murmur heard.   No friction rub. No gallop.    Pulmonary:      Effort: Pulmonary effort is normal. No respiratory distress.      Breath sounds: Normal breath sounds. No wheezing or rales.   Musculoskeletal:         General: Normal range of motion.      Cervical back: Normal range of motion and neck supple.   Skin:     General: Skin is warm and dry.      Coloration: Skin is not pale.       Findings: No erythema or rash.   Neurological:      Mental Status: She is alert and oriented to person, place, and time.   Psychiatric:         Behavior: Behavior normal.         Thought Content: Thought content normal.         Judgment: Judgment normal.           Assessment/Plan   Diagnoses and all orders for this visit:    1. Chronic pain of both knees (Primary)  -     CBC & Differential  -     XR knee 4+ vw bilateral    2. Weight loss  -     amitriptyline (ELAVIL) 25 MG tablet; Take 1 tablet by mouth Every Night.  Dispense: 90 tablet; Refill: 1    3. Essential hypertension  -     amLODIPine (NORVASC) 5 MG tablet; Take 1 tablet by mouth Daily.  Dispense: 90 tablet; Refill: 1    4. Dyslipidemia  -     rosuvastatin (Crestor) 20 MG tablet; Take 1 tablet by mouth Daily.  Dispense: 90 tablet; Refill: 1    5. Peeling skin      Lab Results   Component Value Date    WBC 5.58 08/24/2021    HGB 12.0 08/24/2021    HCT 36.9 08/24/2021    MCV 97.9 (H) 08/24/2021     08/24/2021     Lab Results   Component Value Date    GLUCOSE 94 08/24/2021    BUN 22 08/24/2021    CREATININE 0.76 08/24/2021    EGFRIFAFRI 93 08/24/2021    BCR 28.9 (H) 08/24/2021    K 4.1 08/24/2021    CO2 24.2 08/24/2021    CALCIUM 8.8 08/24/2021    ALBUMIN 4.10 08/24/2021    AST 15 08/24/2021    ALT 11 08/24/2021     Lab Results   Component Value Date    HGBA1C 5.60 08/25/2020     Lab Results   Component Value Date    TSH 0.711 08/24/2021     Lab Results   Component Value Date    CHOL 199 08/24/2021    CHLPL 200 (H) 03/28/2016    TRIG 130 08/24/2021    HDL 50 08/24/2021     (H) 08/24/2021              Patient's Body mass index is 25.19 kg/m². indicating that she is within normal range (BMI 18.5-24.9). No BMI management plan needed..    X-ray and lab following office visit.  Continue current medications.  Follow up in 3 months for routine follow up.  Follow up sooner for problems/concerns.  Patient verbalized understanding and agreement with plan  of care.        This document has been electronically signed by RODNEY David on September 12, 2021 18:31 CDT

## 2021-09-27 ENCOUNTER — TELEPHONE (OUTPATIENT)
Dept: FAMILY MEDICINE CLINIC | Facility: CLINIC | Age: 61
End: 2021-09-27

## 2021-12-02 ENCOUNTER — OFFICE VISIT (OUTPATIENT)
Dept: ORTHOPEDIC SURGERY | Facility: CLINIC | Age: 61
End: 2021-12-02

## 2021-12-02 VITALS
DIASTOLIC BLOOD PRESSURE: 82 MMHG | RESPIRATION RATE: 18 BRPM | HEART RATE: 83 BPM | SYSTOLIC BLOOD PRESSURE: 144 MMHG | OXYGEN SATURATION: 99 % | HEIGHT: 63 IN | WEIGHT: 142 LBS | BODY MASS INDEX: 25.16 KG/M2

## 2021-12-02 DIAGNOSIS — M17.12 PRIMARY LOCALIZED OSTEOARTHROSIS OF LEFT LOWER LEG: ICD-10-CM

## 2021-12-02 DIAGNOSIS — M25.561 ACUTE BILATERAL KNEE PAIN: Primary | ICD-10-CM

## 2021-12-02 DIAGNOSIS — M25.562 ACUTE BILATERAL KNEE PAIN: Primary | ICD-10-CM

## 2021-12-02 DIAGNOSIS — M17.11 PRIMARY LOCALIZED OSTEOARTHROSIS OF RIGHT LOWER LEG: ICD-10-CM

## 2021-12-02 PROCEDURE — 20610 DRAIN/INJ JOINT/BURSA W/O US: CPT | Performed by: ORTHOPAEDIC SURGERY

## 2021-12-02 PROCEDURE — 99203 OFFICE O/P NEW LOW 30 MIN: CPT | Performed by: ORTHOPAEDIC SURGERY

## 2021-12-02 RX ORDER — LIDOCAINE HYDROCHLORIDE AND EPINEPHRINE 10; 10 MG/ML; UG/ML
2 INJECTION, SOLUTION INFILTRATION; PERINEURAL
Status: COMPLETED | OUTPATIENT
Start: 2021-12-02 | End: 2021-12-02

## 2021-12-02 RX ORDER — BUPIVACAINE HYDROCHLORIDE 5 MG/ML
3 INJECTION, SOLUTION PERINEURAL
Status: COMPLETED | OUTPATIENT
Start: 2021-12-02 | End: 2021-12-02

## 2021-12-02 RX ORDER — TRIAMCINOLONE ACETONIDE 40 MG/ML
80 INJECTION, SUSPENSION INTRA-ARTICULAR; INTRAMUSCULAR
Status: COMPLETED | OUTPATIENT
Start: 2021-12-02 | End: 2021-12-02

## 2021-12-02 RX ADMIN — TRIAMCINOLONE ACETONIDE 80 MG: 40 INJECTION, SUSPENSION INTRA-ARTICULAR; INTRAMUSCULAR at 09:33

## 2021-12-02 RX ADMIN — BUPIVACAINE HYDROCHLORIDE 3 ML: 5 INJECTION, SOLUTION PERINEURAL at 09:33

## 2021-12-02 RX ADMIN — LIDOCAINE HYDROCHLORIDE AND EPINEPHRINE 2 ML: 10; 10 INJECTION, SOLUTION INFILTRATION; PERINEURAL at 09:33

## 2021-12-02 NOTE — PROGRESS NOTES
Jo Ann Lacy is a 61 y.o. female   Primary provider:  Spring Hollins APRN       Chief Complaint   Patient presents with   • Left Knee - Pain, Initial Evaluation   • Right Knee - Pain, Initial Evaluation     X-rays [facility]  HISTORY OF PRESENT ILLNESS:Bilateral knee pain.  Pain today 3/10    This Is the first office visit for evaluation of bilateral knee pain.    Ms. Lacy is 61 years old.  She has had pain in both knees for about 3 months.  There is been no history of trauma.  Her left knee bothers her more than the right.  The pain is fairly diffuse in nature.  She has had swelling but no popping.  Her pain is worse with prolonged standing and walking especially while at work.  The pain is been relieved somewhat by ice.  She has had no recent treatment for this.  She said she had what sounds like a cartilage tear in her left knee in 2002 and had arthroscopy for treatment of this.  She said she did well after the surgery.      Home medications include Elavil Norvasc Crestor and vitamin D.  She has no drug allergies.  She smokes 1/2 pack/day of cigarettes.  Past medical history is remarkable for hypertension and reflux.  She is employed as a .  She is single.    RODNEY Epps has asked that I see her for evaluation and treatment.    Pain  This is a new problem. The current episode started more than 1 month ago (3 months). The problem occurs intermittently (moderate). The problem has been unchanged. Associated symptoms include joint swelling. Associated symptoms comments: Aching,swelling. The symptoms are aggravated by standing. She has tried ice and heat for the symptoms. The treatment provided no relief.        CONCURRENT MEDICAL HISTORY:    Past Medical History:   Diagnosis Date   • Benign polyp of large intestine    • Essential hypertension    • Gastroesophageal reflux disease    • Hypertension    • Postmenopausal state    • Tubular adenoma of colon    • Villous adenoma of colon     1.6cm VTA with  "intermediate grade dysplasia transverse.  1.0cm VTA with intermediate grade dysplasia ascending   • Vitamin D deficiency        No Known Allergies      Current Outpatient Medications:   •  amitriptyline (ELAVIL) 25 MG tablet, Take 1 tablet by mouth Every Night., Disp: 90 tablet, Rfl: 1  •  amLODIPine (NORVASC) 5 MG tablet, Take 1 tablet by mouth Daily., Disp: 90 tablet, Rfl: 1  •  rosuvastatin (Crestor) 20 MG tablet, Take 1 tablet by mouth Daily., Disp: 90 tablet, Rfl: 1  •  vitamin D (ERGOCALCIFEROL) 1.25 MG (70134 UT) capsule capsule, Take 1 capsule by mouth 1 (One) Time Per Week., Disp: 12 capsule, Rfl: 3    Past Surgical History:   Procedure Laterality Date   • COLONOSCOPY  05/16/2016    Many 4-5 mm polyps in the transverse colon and in the ascending colon.Resected and retrieved.External and internal hemorrhoids   • HYSTERECTOMY  2013   • KNEE SURGERY Left    • TOTAL ABDOMINAL HYSTERECTOMY WITH SALPINGO OOPHORECTOMY         Family History   Problem Relation Age of Onset   • Stroke Mother    • Cataracts Mother    • Hypertension Mother    • Heart disease Father    • Diabetes Father    • Obesity Father    • Glaucoma Father    • Hypertension Father        Social History     Socioeconomic History   • Marital status: Single   Tobacco Use   • Smoking status: Current Every Day Smoker     Packs/day: 1.00     Years: 45.00     Pack years: 45.00     Types: Cigarettes     Start date: 1975   • Smokeless tobacco: Never Used   Substance and Sexual Activity   • Alcohol use: No   • Drug use: No   • Sexual activity: Not Currently     Birth control/protection: Surgical        Review of Systems   Musculoskeletal: Positive for joint swelling.   Psychiatric/Behavioral: The patient is nervous/anxious.      Review of systems is positive as noted above.    PHYSICAL EXAMINATION:       Vitals:    12/02/21 0908   BP: 144/82   Pulse: 83   Resp: 18   SpO2: 99%   Weight: 64.4 kg (142 lb)   Height: 160 cm (63\")   PainSc:   3       Physical Exam " she is alert pleasant and in no apparent distress at rest.  She responds appropriately to questions and commands.    GAIT:     []  Normal  [x]  Antalgic    Assistive device: []  None  []  Walker     []  Crutches  []  Cane     []  Wheelchair  []  Stretcher    Ortho Exam is directed to the lower extremities.  She walks with a slightly antalgic gait favoring the left.  There is moderate varus of both knees left more than right.  She has a flexion contracture of the left knee estimated at 5 to 10degrees as well as a flexion contracture on the right estimated at 5 degrees.  She has full flexion of both knees.  There is a 2+ effusion of the left knee and trace effusion on the right.  There is no instability in varus and valgus stress of either knee.  Lachman testing is 2+ with no definite stop bilaterally.  Pinky testing produces mild medial pain in the left knee but no crepitus.  Dorsalis pedis pulse is strong bilaterally.  Sensory exam is intact to soft touch.  She has a moderate hallux valgus deformity bilaterally.  She also has a popliteal cyst bilaterally left more than right.    Radiographs of both knees done in August of this year were reviewed.  She has tricompartmental degenerative change most prominent in the medial compartments.  There is moderate joint space narrowing medially seen best on the tunnel views.              ASSESSMENT: Osteoarthritis both knees.  The natural history of the disorder and treatment options were reviewed.  She understands surgery may eventually be needed.  She was instructed in symptomatic care and use of topical analgesics and anti-inflammatories.  Potential benefits of injection therapy were discussed.  She wished to proceed with this.    The left knee was prepped.  Skin was infiltrated with 1% Xylocaine with epinephrine.  The knee was then injected with a mixture of Marcaine Kenalog and Xylocaine with epinephrine.  There were no complications.  After the injection she reported  sniffing improvement in pain with weightbearing.    Return here in 1 month if her symptoms have not improved.  If she is doing well with regard to the left knee we may consider injection of the right knee.    Diagnoses and all orders for this visit:    Acute bilateral knee pain    Primary localized osteoarthrosis of left lower leg    Primary localized osteoarthrosis of right lower leg    Other orders  -     Large Joint Arthrocentesis: L knee          PLAN  Large Joint Arthrocentesis: L knee  Date/Time: 12/2/2021 9:33 AM  Consent given by: patient  Site marked: site marked  Timeout: Immediately prior to procedure a time out was called to verify the correct patient, procedure, equipment, support staff and site/side marked as required   Supporting Documentation  Indications: pain   Procedure Details  Location: knee - L knee  Preparation: Patient was prepped and draped in the usual sterile fashion  Needle size: 22 G  Approach: anteromedial  Medications administered: 80 mg triamcinolone acetonide 40 MG/ML; 3 mL bupivacaine 0.5 %; 2 mL lidocaine 1% - EPINEPHrine 1:[id number] 1 %-1:[id number]  Patient tolerance: patient tolerated the procedure well with no immediate complications        Return in about 4 weeks (around 12/30/2021).    Terrance Laughlin MD

## 2022-03-30 ENCOUNTER — OFFICE VISIT (OUTPATIENT)
Dept: FAMILY MEDICINE CLINIC | Facility: CLINIC | Age: 62
End: 2022-03-30

## 2022-03-30 VITALS
RESPIRATION RATE: 20 BRPM | BODY MASS INDEX: 23.16 KG/M2 | HEIGHT: 63 IN | OXYGEN SATURATION: 95 % | DIASTOLIC BLOOD PRESSURE: 80 MMHG | WEIGHT: 130.7 LBS | HEART RATE: 89 BPM | TEMPERATURE: 97.5 F | SYSTOLIC BLOOD PRESSURE: 140 MMHG

## 2022-03-30 DIAGNOSIS — E53.8 VITAMIN B 12 DEFICIENCY: Primary | ICD-10-CM

## 2022-03-30 DIAGNOSIS — E78.5 DYSLIPIDEMIA: ICD-10-CM

## 2022-03-30 DIAGNOSIS — F43.9 STRESS: ICD-10-CM

## 2022-03-30 DIAGNOSIS — F41.9 ANXIETY: ICD-10-CM

## 2022-03-30 DIAGNOSIS — I10 ESSENTIAL HYPERTENSION: Chronic | ICD-10-CM

## 2022-03-30 PROCEDURE — 96372 THER/PROPH/DIAG INJ SC/IM: CPT | Performed by: NURSE PRACTITIONER

## 2022-03-30 PROCEDURE — 99214 OFFICE O/P EST MOD 30 MIN: CPT | Performed by: NURSE PRACTITIONER

## 2022-03-30 RX ORDER — HYDROXYZINE HYDROCHLORIDE 25 MG/1
25-50 TABLET, FILM COATED ORAL NIGHTLY PRN
Qty: 60 TABLET | Refills: 5 | Status: SHIPPED | OUTPATIENT
Start: 2022-03-30 | End: 2022-05-17 | Stop reason: SDDI

## 2022-03-30 RX ORDER — CYANOCOBALAMIN 1000 UG/ML
1000 INJECTION, SOLUTION INTRAMUSCULAR; SUBCUTANEOUS
Status: DISCONTINUED | OUTPATIENT
Start: 2022-03-30 | End: 2022-05-17

## 2022-03-30 RX ADMIN — CYANOCOBALAMIN 1000 MCG: 1000 INJECTION, SOLUTION INTRAMUSCULAR; SUBCUTANEOUS at 14:08

## 2022-04-27 NOTE — PROGRESS NOTES
Subjective   Jo Ann Lacy is a 61 y.o. female.     She presents today for her routine follow up on chronic medical problems.  Her BP is fairly well controlled on her current medication without side effects.  She is taking her cholesterol medication without side effects.  She reports that she has been doing OK since her last office visit with me.  She reports that her job continues to be stressful.  She has been struggling with anxiety and stress symptoms recently.  She reports she is having problems with her landlord and her living situation.  She is trying to find somewhere else to live.  Her BMI is currently 23.2%.  She is otherwise without any other new complaints today in the office.    Hypertension  This is a chronic problem. The current episode started more than 1 year ago. The problem has been resolved since onset. The problem is controlled. Pertinent negatives include no anxiety, blurred vision, chest pain, headaches, neck pain, orthopnea, palpitations, peripheral edema, PND, shortness of breath or sweats. There are no associated agents to hypertension. Risk factors for coronary artery disease include family history, dyslipidemia, post-menopausal state, sedentary lifestyle, stress and smoking/tobacco exposure. Past treatments include calcium channel blockers. Current antihypertension treatment includes calcium channel blockers. The current treatment provides significant improvement. Compliance problems include diet and exercise.  There is no history of chronic renal disease.   Hyperlipidemia  This is a chronic problem. The current episode started more than 1 year ago. She has no history of chronic renal disease, diabetes, hypothyroidism, liver disease, obesity or nephrotic syndrome. There are no known factors aggravating her hyperlipidemia. Pertinent negatives include no chest pain, focal sensory loss, leg pain or shortness of breath. Current antihyperlipidemic treatment includes fibric acid derivatives.  Compliance problems include adherence to diet, adherence to exercise and psychosocial issues.  Risk factors for coronary artery disease include dyslipidemia, family history, hypertension, post-menopausal, a sedentary lifestyle and stress.        The following portions of the patient's history were reviewed and updated as appropriate: allergies, current medications, past family history, past medical history, past social history, past surgical history and problem list.    Review of Systems   Constitutional: Positive for fatigue.   HENT: Negative.    Eyes: Negative.  Negative for blurred vision.   Respiratory: Negative.  Negative for shortness of breath.    Cardiovascular: Negative.  Negative for chest pain, palpitations, orthopnea and PND.   Gastrointestinal: Negative.    Musculoskeletal: Negative.  Negative for neck pain.   Skin: Negative.    Allergic/Immunologic: Negative.    Neurological: Negative.    Hematological: Negative.    Psychiatric/Behavioral: Positive for sleep disturbance and stress. The patient is nervous/anxious.        Objective   Physical Exam  Vitals reviewed.   Constitutional:       General: She is not in acute distress.     Appearance: She is well-developed. She is not diaphoretic.   HENT:      Head: Normocephalic.      Right Ear: External ear normal.      Left Ear: External ear normal.      Nose: Nose normal.   Eyes:      Pupils: Pupils are equal, round, and reactive to light.   Neck:      Thyroid: No thyromegaly.      Vascular: No JVD.   Cardiovascular:      Rate and Rhythm: Normal rate and regular rhythm.      Heart sounds: No murmur heard.    No friction rub. No gallop.   Pulmonary:      Effort: Pulmonary effort is normal. No respiratory distress.      Breath sounds: Normal breath sounds. No wheezing or rales.   Musculoskeletal:         General: Normal range of motion.      Cervical back: Normal range of motion and neck supple.   Skin:     General: Skin is warm and dry.      Coloration: Skin  is not pale.      Findings: No erythema or rash.   Neurological:      Mental Status: She is alert and oriented to person, place, and time.   Psychiatric:         Behavior: Behavior normal.         Thought Content: Thought content normal.         Judgment: Judgment normal.           Assessment/Plan   Diagnoses and all orders for this visit:    1. Vitamin B 12 deficiency (Primary)  -     cyanocobalamin injection 1,000 mcg    2. Anxiety  Comments:  Atarax 25 to 50 mg nightly as needed for anxiety/stress symptoms.  Orders:  -     hydrOXYzine (ATARAX) 25 MG tablet; Take 1-2 tablets by mouth At Night As Needed for Anxiety.  Dispense: 60 tablet; Refill: 5    3. Stress  Comments:  Atarax 25 to 50 mg nightly as needed for anxiety/stress symptoms.  Orders:  -     hydrOXYzine (ATARAX) 25 MG tablet; Take 1-2 tablets by mouth At Night As Needed for Anxiety.  Dispense: 60 tablet; Refill: 5    4. Essential hypertension  Comments:  Continue amlodipine 5 mg daily for hypertension management his blood pressure is fairly well controlled.    5. Dyslipidemia  Comments:  Continue Crestor 20 mg nightly for dyslipidemia management.               BMI is within normal parameters. No follow-up required.    B12 given IM in the office.  Atarax nightly as needed for anxiety/stress.  Continue all current medications.  Follow up as scheduled for routine follow up.  Follow up sooner for problems/concerns.  Patient verbalized understanding and agreement with plan of care.        This document has been electronically signed by RODNEY David on April 27, 2022 08:07 CDT

## 2022-05-17 ENCOUNTER — OFFICE VISIT (OUTPATIENT)
Dept: FAMILY MEDICINE CLINIC | Facility: CLINIC | Age: 62
End: 2022-05-17

## 2022-05-17 VITALS
DIASTOLIC BLOOD PRESSURE: 80 MMHG | HEART RATE: 90 BPM | BODY MASS INDEX: 23.3 KG/M2 | RESPIRATION RATE: 20 BRPM | SYSTOLIC BLOOD PRESSURE: 132 MMHG | OXYGEN SATURATION: 97 % | TEMPERATURE: 97.2 F | WEIGHT: 131.5 LBS | HEIGHT: 63 IN

## 2022-05-17 DIAGNOSIS — Z12.31 ENCOUNTER FOR SCREENING MAMMOGRAM FOR BREAST CANCER: Primary | ICD-10-CM

## 2022-05-17 DIAGNOSIS — Z78.0 POST-MENOPAUSAL: ICD-10-CM

## 2022-05-17 DIAGNOSIS — Z13.820 SCREENING FOR OSTEOPOROSIS: ICD-10-CM

## 2022-05-17 DIAGNOSIS — I10 ESSENTIAL HYPERTENSION: ICD-10-CM

## 2022-05-17 DIAGNOSIS — E78.5 DYSLIPIDEMIA: ICD-10-CM

## 2022-05-17 DIAGNOSIS — E53.8 B12 DEFICIENCY: ICD-10-CM

## 2022-05-17 DIAGNOSIS — E55.9 VITAMIN D DEFICIENCY: ICD-10-CM

## 2022-05-17 PROCEDURE — 99214 OFFICE O/P EST MOD 30 MIN: CPT | Performed by: NURSE PRACTITIONER

## 2022-05-17 PROCEDURE — 96372 THER/PROPH/DIAG INJ SC/IM: CPT | Performed by: NURSE PRACTITIONER

## 2022-05-17 RX ADMIN — CYANOCOBALAMIN 1000 MCG: 1000 INJECTION, SOLUTION INTRAMUSCULAR; SUBCUTANEOUS at 14:32

## 2022-05-17 NOTE — PATIENT INSTRUCTIONS
Managing Anxiety, Adult  After being diagnosed with an anxiety disorder, you may be relieved to know why you have felt or behaved a certain way. You may also feel overwhelmed about the treatment ahead and what it will mean for your life. With care and support, you can manage this condition and recover from it.  How to manage lifestyle changes  Managing stress and anxiety    Stress is your body's reaction to life changes and events, both good and bad. Most stress will last just a few hours, but stress can be ongoing and can lead to more than just stress. Although stress can play a major role in anxiety, it is not the same as anxiety. Stress is usually caused by something external, such as a deadline, test, or competition. Stress normally passes after the triggering event has ended.   Anxiety is caused by something internal, such as imagining a terrible outcome or worrying that something will go wrong that will devastate you. Anxiety often does not go away even after the triggering event is over, and it can become long-term (chronic) worry. It is important to understand the differences between stress and anxiety and to manage your stress effectively so that it does not lead to an anxious response.  Talk with your health care provider or a counselor to learn more about reducing anxiety and stress. He or she may suggest tension reduction techniques, such as:  Music therapy. This can include creating or listening to music that you enjoy and that inspires you.  Mindfulness-based meditation. This involves being aware of your normal breaths while not trying to control your breathing. It can be done while sitting or walking.  Centering prayer. This involves focusing on a word, phrase, or sacred image that means something to you and brings you peace.  Deep breathing. To do this, expand your stomach and inhale slowly through your nose. Hold your breath for 3-5 seconds. Then exhale slowly, letting your stomach muscles  relax.  Self-talk. This involves identifying thought patterns that lead to anxiety reactions and changing those patterns.  Muscle relaxation. This involves tensing muscles and then relaxing them.  Choose a tension reduction technique that suits your lifestyle and personality. These techniques take time and practice. Set aside 5-15 minutes a day to do them. Therapists can offer counseling and training in these techniques. The training to help with anxiety may be covered by some insurance plans. Other things you can do to manage stress and anxiety include:  Keeping a stress/anxiety diary. This can help you learn what triggers your reaction and then learn ways to manage your response.  Thinking about how you react to certain situations. You may not be able to control everything, but you can control your response.  Making time for activities that help you relax and not feeling guilty about spending your time in this way.  Visual imagery and yoga can help you stay calm and relax.    Medicines  Medicines can help ease symptoms. Medicines for anxiety include:  Anti-anxiety drugs.  Antidepressants.  Medicines are often used as a primary treatment for anxiety disorder. Medicines will be prescribed by a health care provider. When used together, medicines, psychotherapy, and tension reduction techniques may be the most effective treatment.  Relationships  Relationships can play a big part in helping you recover. Try to spend more time connecting with trusted friends and family members. Consider going to couples counseling, taking family education classes, or going to family therapy. Therapy can help you and others better understand your condition.  How to recognize changes in your anxiety  Everyone responds differently to treatment for anxiety. Recovery from anxiety happens when symptoms decrease and stop interfering with your daily activities at home or work. This may mean that you will start to:  Have better concentration  and focus. Worry will interfere less in your daily thinking.  Sleep better.  Be less irritable.  Have more energy.  Have improved memory.  It is important to recognize when your condition is getting worse. Contact your health care provider if your symptoms interfere with home or work and you feel like your condition is not improving.  Follow these instructions at home:  Activity  Exercise. Most adults should do the following:  Exercise for at least 150 minutes each week. The exercise should increase your heart rate and make you sweat (moderate-intensity exercise).  Strengthening exercises at least twice a week.  Get the right amount and quality of sleep. Most adults need 7-9 hours of sleep each night.  Lifestyle    Eat a healthy diet that includes plenty of vegetables, fruits, whole grains, low-fat dairy products, and lean protein. Do not eat a lot of foods that are high in solid fats, added sugars, or salt.  Make choices that simplify your life.  Do not use any products that contain nicotine or tobacco, such as cigarettes, e-cigarettes, and chewing tobacco. If you need help quitting, ask your health care provider.  Avoid caffeine, alcohol, and certain over-the-counter cold medicines. These may make you feel worse. Ask your pharmacist which medicines to avoid.    General instructions  Take over-the-counter and prescription medicines only as told by your health care provider.  Keep all follow-up visits as told by your health care provider. This is important.  Where to find support  You can get help and support from these sources:  Self-help groups.  Online and community organizations.  A trusted spiritual leader.  Couples counseling.  Family education classes.  Family therapy.  Where to find more information  You may find that joining a support group helps you deal with your anxiety. The following sources can help you locate counselors or support groups near you:  Mental Health [place]:  www.mentalhealth[place].net  Anxiety and Depression Association of [place] (ADAA): www.adaa.org  National [place] [employer] Mental [employer]ness (SHAHIDA): www.shahida.org  Contact a health care provider if you:  Have a hard time staying focused or finishing daily tasks.  Spend many hours a day feeling worried about everyday life.  Become exhausted by worry.  Start to have headaches, feel tense, or have nausea.  Urinate more than normal.  Have diarrhea.  Get help right away if you have:  A racing heart and shortness of breath.  Thoughts of hurting yourself or others.  If you ever feel like you may hurt yourself or others, or have thoughts about taking your own life, get help right away. You can go to your nearest emergency department or call:  Your local emergency services (911 in the [place]  A suicide crisis helpline, such as the National Suicide Prevention Lifeline at 1-594.939.3335. This is open 24 hours a day.  Summary  Taking steps to learn and use tension reduction techniques can help calm you and help prevent triggering an anxiety reaction.  When used together, medicines, psychotherapy, and tension reduction techniques may be the most effective treatment.  Family, friends, and partners can play a big part in helping you recover from an anxiety disorder.  This information is not intended to replace advice given to you by your health care provider. Make sure you discuss any questions you have with your health care provider.  Document Revised: 05/19/2020 Document Reviewed: 05/19/2020  [employer] Patient Education © 2021 [employer]

## 2022-05-17 NOTE — PROGRESS NOTES
Injection  Injection performed in  Left Deltoid  by Trina Carcamo MA. Patient tolerated the procedure well without complications.  05/17/22   Trina Carcamo MA

## 2022-05-31 NOTE — PROGRESS NOTES
Subjective   Jo Ann Lacy is a 61 y.o. female.     She presents today for her routine follow up on chronic medical problems.  Her BP is fairly well controlled on her current medication without side effects.  She reports she is not taking her blood pressure or cholesterol medications at this time.  She reports that she has been doing OK since her last office visit with me.  She reports that her job continues to be stressful.  She has been struggling with anxiety and stress symptoms recently.  She reports she is having problems with her landlord and her living situation.  She is still trying to find somewhere else to live.  Her BMI is currently 23.3%.  She is resistant to taking any of her routine daily medications.  She is due for screening mammogram bone density.  She is otherwise without any other new complaints today in the office.    Hypertension  This is a chronic problem. The current episode started more than 1 year ago. The problem has been resolved since onset. The problem is controlled. Pertinent negatives include no anxiety, blurred vision, chest pain, headaches, neck pain, orthopnea, palpitations, peripheral edema, PND, shortness of breath or sweats. There are no associated agents to hypertension. Risk factors for coronary artery disease include family history, dyslipidemia, post-menopausal state, sedentary lifestyle, stress and smoking/tobacco exposure. Past treatments include calcium channel blockers. Current antihypertension treatment includes calcium channel blockers. The current treatment provides significant improvement. Compliance problems include diet and exercise.  There is no history of chronic renal disease.   Hyperlipidemia  This is a chronic problem. The current episode started more than 1 year ago. She has no history of chronic renal disease, diabetes, hypothyroidism, liver disease, obesity or nephrotic syndrome. There are no known factors aggravating her hyperlipidemia. Pertinent negatives  include no chest pain, focal sensory loss, leg pain or shortness of breath. Current antihyperlipidemic treatment includes fibric acid derivatives. Compliance problems include adherence to diet, adherence to exercise and psychosocial issues.  Risk factors for coronary artery disease include dyslipidemia, family history, hypertension, post-menopausal, a sedentary lifestyle and stress.        The following portions of the patient's history were reviewed and updated as appropriate: allergies, current medications, past family history, past medical history, past social history, past surgical history and problem list.    Review of Systems   Constitutional: Negative.    HENT: Negative.    Eyes: Negative.  Negative for blurred vision.   Respiratory: Negative.  Negative for shortness of breath.    Cardiovascular: Negative.  Negative for chest pain, palpitations, orthopnea and PND.   Gastrointestinal: Negative.    Musculoskeletal: Negative.  Negative for neck pain.   Skin: Negative.    Allergic/Immunologic: Negative.    Neurological: Negative.    Hematological: Negative.    Psychiatric/Behavioral: Negative.        Objective   Physical Exam  Vitals reviewed.   Constitutional:       General: She is not in acute distress.     Appearance: She is well-developed. She is not diaphoretic.   HENT:      Head: Normocephalic.      Right Ear: External ear normal.      Left Ear: External ear normal.      Nose: Nose normal.   Eyes:      Pupils: Pupils are equal, round, and reactive to light.   Neck:      Thyroid: No thyromegaly.      Vascular: No JVD.   Cardiovascular:      Rate and Rhythm: Normal rate and regular rhythm.      Heart sounds: No murmur heard.    No friction rub. No gallop.   Pulmonary:      Effort: Pulmonary effort is normal. No respiratory distress.      Breath sounds: Normal breath sounds. No wheezing or rales.   Musculoskeletal:         General: Normal range of motion.      Cervical back: Normal range of motion and neck  supple.   Skin:     General: Skin is warm and dry.      Coloration: Skin is not pale.      Findings: No erythema or rash.   Neurological:      Mental Status: She is alert and oriented to person, place, and time.   Psychiatric:         Behavior: Behavior normal.         Thought Content: Thought content normal.         Judgment: Judgment normal.           Assessment & Plan   Diagnoses and all orders for this visit:    1. Encounter for screening mammogram for breast cancer (Primary)  -     Mammo Screening Bilateral With CAD; Future    2. Screening for osteoporosis  -     DEXA Bone Density Axial; Future    3. Post-menopausal  -     DEXA Bone Density Axial; Future    4. B12 deficiency  Comments:  B12 1000 mcg given IM in the office for B12 deficiency management.    5. Vitamin D deficiency    6. Essential hypertension    7. Dyslipidemia               B12 given IM in the office.  Schedule for screening mammogram bone density.  Continue current medications.  Follow up as scheduled for routine follow up.  Follow up sooner for problems/concerns.  Patient verbalized understanding and agreement with plan of care.        This document has been electronically signed by RODNEY David on May 31, 2022 11:03 CDT

## 2022-06-24 DIAGNOSIS — R63.4 WEIGHT LOSS: ICD-10-CM

## 2022-06-24 DIAGNOSIS — I10 ESSENTIAL HYPERTENSION: Chronic | ICD-10-CM

## 2022-06-24 DIAGNOSIS — E78.5 DYSLIPIDEMIA: ICD-10-CM

## 2022-06-24 RX ORDER — ROSUVASTATIN CALCIUM 20 MG/1
TABLET, COATED ORAL
Qty: 90 TABLET | Refills: 1 | OUTPATIENT
Start: 2022-06-24

## 2022-06-24 RX ORDER — AMLODIPINE BESYLATE 5 MG/1
TABLET ORAL
Qty: 90 TABLET | Refills: 1 | OUTPATIENT
Start: 2022-06-24

## 2022-06-24 RX ORDER — AMITRIPTYLINE HYDROCHLORIDE 25 MG/1
TABLET, FILM COATED ORAL
Qty: 90 TABLET | Refills: 1 | OUTPATIENT
Start: 2022-06-24

## 2022-06-24 NOTE — TELEPHONE ENCOUNTER
Rx Refill Note  Requested Prescriptions     Refused Prescriptions Disp Refills   • amitriptyline (ELAVIL) 25 MG tablet [Pharmacy Med Name: amitriptyline 25 mg tablet] 90 tablet 1     Sig: TAKE ONE TABLET BY MOUTH EVERY night   • amLODIPine (NORVASC) 5 MG tablet [Pharmacy Med Name: amlodipine 5 mg tablet] 90 tablet 1     Sig: TAKE ONE TABLET BY MOUTH DAILY   • rosuvastatin (CRESTOR) 20 MG tablet [Pharmacy Med Name: rosuvastatin 20 mg tablet] 90 tablet 1     Sig: TAKE ONE TABLET BY MOUTH DAILY      Last office visit with prescribing clinician: 5/17/2022      Next office visit with prescribing clinician: 8/23/2022   {TIP  Encounters:    ALL MEDS WERE discontinued on 5/17/2022 by Spring Hollins, RODNEY for the following reason: Non-compliance       {TIP  Is Refill Pharmacy correct?:23}  Marline Mcdonald LPN  06/24/22, 08:23 CDT

## 2022-08-23 ENCOUNTER — OFFICE VISIT (OUTPATIENT)
Dept: FAMILY MEDICINE CLINIC | Facility: CLINIC | Age: 62
End: 2022-08-23

## 2022-08-23 ENCOUNTER — TELEPHONE (OUTPATIENT)
Dept: FAMILY MEDICINE CLINIC | Facility: CLINIC | Age: 62
End: 2022-08-23

## 2022-08-23 VITALS
BODY MASS INDEX: 21.53 KG/M2 | DIASTOLIC BLOOD PRESSURE: 60 MMHG | HEART RATE: 95 BPM | TEMPERATURE: 97.7 F | OXYGEN SATURATION: 95 % | WEIGHT: 121.5 LBS | SYSTOLIC BLOOD PRESSURE: 120 MMHG | RESPIRATION RATE: 20 BRPM | HEIGHT: 63 IN

## 2022-08-23 DIAGNOSIS — I10 PRIMARY HYPERTENSION: ICD-10-CM

## 2022-08-23 DIAGNOSIS — Z13.1 SCREENING FOR DIABETES MELLITUS: ICD-10-CM

## 2022-08-23 DIAGNOSIS — Z13.29 SCREENING FOR THYROID DISORDER: ICD-10-CM

## 2022-08-23 DIAGNOSIS — Z13.220 SCREENING FOR LIPOID DISORDERS: ICD-10-CM

## 2022-08-23 DIAGNOSIS — M79.604 BILATERAL LEG PAIN: ICD-10-CM

## 2022-08-23 DIAGNOSIS — Z00.00 WELL ADULT EXAM: Primary | ICD-10-CM

## 2022-08-23 DIAGNOSIS — M79.605 BILATERAL LEG PAIN: ICD-10-CM

## 2022-08-23 DIAGNOSIS — Z12.11 SCREEN FOR COLON CANCER: ICD-10-CM

## 2022-08-23 PROCEDURE — 99396 PREV VISIT EST AGE 40-64: CPT | Performed by: NURSE PRACTITIONER

## 2022-08-23 RX ORDER — CYPROHEPTADINE HYDROCHLORIDE 4 MG/1
4 TABLET ORAL 3 TIMES DAILY
Qty: 90 TABLET | Refills: 1 | Status: SHIPPED | OUTPATIENT
Start: 2022-08-23 | End: 2022-12-09

## 2022-08-23 RX ORDER — AMLODIPINE BESYLATE 5 MG/1
5 TABLET ORAL DAILY
Qty: 90 TABLET | Refills: 3 | Status: SHIPPED | OUTPATIENT
Start: 2022-08-23 | End: 2022-12-05

## 2022-08-23 NOTE — TELEPHONE ENCOUNTER
Patient called stating her BP medication had been called in, but she has to wait on insurance for it. Patient said she was supposed to be prescribed periactin (was prescribed by Dr. Olsen in the past), but it wasn't at her pharmacy.   Call back number: [phone]

## 2022-09-02 ENCOUNTER — LAB (OUTPATIENT)
Dept: LAB | Facility: HOSPITAL | Age: 62
End: 2022-09-02

## 2022-09-02 PROCEDURE — 82043 UR ALBUMIN QUANTITATIVE: CPT | Performed by: NURSE PRACTITIONER

## 2022-09-02 PROCEDURE — 82570 ASSAY OF URINE CREATININE: CPT | Performed by: NURSE PRACTITIONER

## 2022-09-02 PROCEDURE — 80061 LIPID PANEL: CPT | Performed by: NURSE PRACTITIONER

## 2022-09-02 PROCEDURE — 80050 GENERAL HEALTH PANEL: CPT | Performed by: NURSE PRACTITIONER

## 2022-09-02 PROCEDURE — 83036 HEMOGLOBIN GLYCOSYLATED A1C: CPT | Performed by: NURSE PRACTITIONER

## 2022-09-02 PROCEDURE — 82306 VITAMIN D 25 HYDROXY: CPT | Performed by: NURSE PRACTITIONER

## 2022-09-03 LAB
25(OH)D3 SERPL-MCNC: 16.4 NG/ML (ref 30–100)
ALBUMIN SERPL-MCNC: 4.2 G/DL (ref 3.5–5.2)
ALBUMIN UR-MCNC: <1.2 MG/DL
ALBUMIN/GLOB SERPL: 1.8 G/DL
ALP SERPL-CCNC: 62 U/L (ref 39–117)
ALT SERPL W P-5'-P-CCNC: 14 U/L (ref 1–33)
ANION GAP SERPL CALCULATED.3IONS-SCNC: 12.7 MMOL/L (ref 5–15)
AST SERPL-CCNC: 15 U/L (ref 1–32)
BASOPHILS # BLD AUTO: 0.04 10*3/MM3 (ref 0–0.2)
BASOPHILS NFR BLD AUTO: 0.6 % (ref 0–1.5)
BILIRUB SERPL-MCNC: <0.2 MG/DL (ref 0–1.2)
BUN SERPL-MCNC: 19 MG/DL (ref 8–23)
BUN/CREAT SERPL: 29.2 (ref 7–25)
CALCIUM SPEC-SCNC: 9.9 MG/DL (ref 8.6–10.5)
CHLORIDE SERPL-SCNC: 107 MMOL/L (ref 98–107)
CHOLEST SERPL-MCNC: 190 MG/DL (ref 0–200)
CO2 SERPL-SCNC: 21.3 MMOL/L (ref 22–29)
CREAT SERPL-MCNC: 0.65 MG/DL (ref 0.57–1)
CREAT UR-MCNC: 86 MG/DL
DEPRECATED RDW RBC AUTO: 48.3 FL (ref 37–54)
EGFRCR SERPLBLD CKD-EPI 2021: 99.1 ML/MIN/1.73
EOSINOPHIL # BLD AUTO: 0.2 10*3/MM3 (ref 0–0.4)
EOSINOPHIL NFR BLD AUTO: 3.2 % (ref 0.3–6.2)
ERYTHROCYTE [DISTWIDTH] IN BLOOD BY AUTOMATED COUNT: 13.3 % (ref 12.3–15.4)
GLOBULIN UR ELPH-MCNC: 2.3 GM/DL
GLUCOSE SERPL-MCNC: 90 MG/DL (ref 65–99)
HBA1C MFR BLD: 5.4 % (ref 4.8–5.6)
HCT VFR BLD AUTO: 38 % (ref 34–46.6)
HDLC SERPL-MCNC: 67 MG/DL (ref 40–60)
HGB BLD-MCNC: 12.7 G/DL (ref 12–15.9)
IMM GRANULOCYTES # BLD AUTO: 0.01 10*3/MM3 (ref 0–0.05)
IMM GRANULOCYTES NFR BLD AUTO: 0.2 % (ref 0–0.5)
LDLC SERPL CALC-MCNC: 107 MG/DL (ref 0–100)
LDLC/HDLC SERPL: 1.58 {RATIO}
LYMPHOCYTES # BLD AUTO: 2.38 10*3/MM3 (ref 0.7–3.1)
LYMPHOCYTES NFR BLD AUTO: 37.7 % (ref 19.6–45.3)
MCH RBC QN AUTO: 32.9 PG (ref 26.6–33)
MCHC RBC AUTO-ENTMCNC: 33.4 G/DL (ref 31.5–35.7)
MCV RBC AUTO: 98.4 FL (ref 79–97)
MICROALBUMIN/CREAT UR: NORMAL MG/G{CREAT}
MONOCYTES # BLD AUTO: 0.5 10*3/MM3 (ref 0.1–0.9)
MONOCYTES NFR BLD AUTO: 7.9 % (ref 5–12)
NEUTROPHILS NFR BLD AUTO: 3.18 10*3/MM3 (ref 1.7–7)
NEUTROPHILS NFR BLD AUTO: 50.4 % (ref 42.7–76)
NRBC BLD AUTO-RTO: 0 /100 WBC (ref 0–0.2)
PLATELET # BLD AUTO: 282 10*3/MM3 (ref 140–450)
PMV BLD AUTO: 10.6 FL (ref 6–12)
POTASSIUM SERPL-SCNC: 3.9 MMOL/L (ref 3.5–5.2)
PROT SERPL-MCNC: 6.5 G/DL (ref 6–8.5)
RBC # BLD AUTO: 3.86 10*6/MM3 (ref 3.77–5.28)
SODIUM SERPL-SCNC: 141 MMOL/L (ref 136–145)
TRIGL SERPL-MCNC: 86 MG/DL (ref 0–150)
TSH SERPL DL<=0.05 MIU/L-ACNC: 1.13 UIU/ML (ref 0.27–4.2)
VLDLC SERPL-MCNC: 16 MG/DL (ref 5–40)
WBC NRBC COR # BLD: 6.31 10*3/MM3 (ref 3.4–10.8)

## 2022-09-06 NOTE — PROGRESS NOTES
Subjective   Jo Ann Lacy is a 61 y.o. female.     She presents today for her annual wellness visit and routine follow-up on chronic medical problems.  She reports that she has been tolerating her amlodipine without side effect.  Her blood pressure is well controlled today in the office.  She would like a prescription for Periactin to help with weight gain.  She is lost 10 pounds since her last office visit.  Her BMI is currently 21.5%.  She is due for routine fasting labs.  She is also due for colon cancer screening.  She does have complaints of pain in her bilateral legs.  She would like to have this evaluated.  She is otherwise without any other new complaints today in the office.       The following portions of the patient's history were reviewed and updated as appropriate: allergies, current medications, past family history, past medical history, past social history, past surgical history and problem list.    Review of Systems   Constitutional: Positive for unexpected weight loss.   HENT: Negative.    Eyes: Negative.    Respiratory: Negative.    Cardiovascular: Negative.    Gastrointestinal: Negative.    Musculoskeletal: Positive for arthralgias and myalgias.   Skin: Negative.    Allergic/Immunologic: Negative.    Neurological: Negative.    Hematological: Negative.    Psychiatric/Behavioral: Negative.        Objective   Physical Exam  Vitals reviewed.   Constitutional:       General: She is not in acute distress.     Appearance: She is well-developed. She is not diaphoretic.   HENT:      Head: Normocephalic.      Right Ear: External ear normal.      Left Ear: External ear normal.      Nose: Nose normal.   Eyes:      Pupils: Pupils are equal, round, and reactive to light.   Neck:      Thyroid: No thyromegaly.      Vascular: No JVD.   Cardiovascular:      Rate and Rhythm: Normal rate and regular rhythm.      Heart sounds: No murmur heard.    No friction rub. No gallop.   Pulmonary:      Effort: Pulmonary effort  is normal. No respiratory distress.      Breath sounds: Normal breath sounds. No wheezing or rales.   Musculoskeletal:         General: Normal range of motion.      Cervical back: Normal range of motion and neck supple.   Skin:     General: Skin is warm and dry.      Coloration: Skin is not pale.      Findings: No erythema or rash.   Neurological:      Mental Status: She is alert and oriented to person, place, and time.   Psychiatric:         Behavior: Behavior normal.         Thought Content: Thought content normal.         Judgment: Judgment normal.           Assessment & Plan   Diagnoses and all orders for this visit:    1. Well adult exam (Primary)  -     CBC & Differential  -     Comprehensive Metabolic Panel  -     Hemoglobin A1c  -     Lipid Panel  -     TSH  -     Vitamin D 25 Hydroxy  -     Microalbumin / Creatinine Urine Ratio - Urine, Clean Catch    2. Screening for diabetes mellitus  -     CBC & Differential  -     Comprehensive Metabolic Panel  -     Hemoglobin A1c  -     Lipid Panel  -     TSH  -     Vitamin D 25 Hydroxy  -     Microalbumin / Creatinine Urine Ratio - Urine, Clean Catch    3. Screening for lipoid disorders  -     CBC & Differential  -     Comprehensive Metabolic Panel  -     Hemoglobin A1c  -     Lipid Panel  -     TSH  -     Vitamin D 25 Hydroxy  -     Microalbumin / Creatinine Urine Ratio - Urine, Clean Catch    4. Screening for thyroid disorder  -     CBC & Differential  -     Comprehensive Metabolic Panel  -     Hemoglobin A1c  -     Lipid Panel  -     TSH  -     Vitamin D 25 Hydroxy  -     Microalbumin / Creatinine Urine Ratio - Urine, Clean Catch    5. Primary hypertension  -     CBC & Differential  -     Comprehensive Metabolic Panel  -     Hemoglobin A1c  -     Lipid Panel  -     TSH  -     Vitamin D 25 Hydroxy  -     Microalbumin / Creatinine Urine Ratio - Urine, Clean Catch  -     amLODIPine (NORVASC) 5 MG tablet; Take 1 tablet by mouth Daily.  Dispense: 90 tablet; Refill:  3    6. Bilateral leg pain  -     XR Spine Lumbar 4+ View  -     XR Hips Bilateral With or Without Pelvis 2 View    7. Screen for colon cancer  -     Ambulatory Referral to Gastroenterology    Other orders  -     cyproheptadine (PERIACTIN) 4 MG tablet; Take 1 tablet by mouth 3 (Three) Times a Day.  Dispense: 90 tablet; Refill: 1               BMI is within normal parameters. No other follow-up for BMI required.    Anticipatory guidance provided at discharge.  Fasting labs.  X-ray following office visit.  Referral to GI for colon cancer screening.  Periactin 3 times daily for weight gain.  Continue current medications.  Follow up as scheduled for routine follow up.  Follow up sooner for problems/concerns.  Patient verbalized understanding and agreement with plan of care.        This document has been electronically signed by RODNEY David on September 6, 2022 10:44 CDT

## 2022-09-13 ENCOUNTER — TELEPHONE (OUTPATIENT)
Dept: FAMILY MEDICINE CLINIC | Facility: CLINIC | Age: 62
End: 2022-09-13

## 2022-09-13 DIAGNOSIS — M25.551 CHRONIC HIP PAIN, BILATERAL: ICD-10-CM

## 2022-09-13 DIAGNOSIS — M25.552 CHRONIC HIP PAIN, BILATERAL: ICD-10-CM

## 2022-09-13 DIAGNOSIS — E55.9 VITAMIN D DEFICIENCY: Primary | ICD-10-CM

## 2022-09-13 DIAGNOSIS — G89.29 CHRONIC HIP PAIN, BILATERAL: ICD-10-CM

## 2022-09-13 RX ORDER — ERGOCALCIFEROL 1.25 MG/1
50000 CAPSULE ORAL WEEKLY
Qty: 12 CAPSULE | Refills: 3 | Status: SHIPPED | OUTPATIENT
Start: 2022-09-13 | End: 2023-02-28 | Stop reason: SDUPTHER

## 2022-09-13 NOTE — TELEPHONE ENCOUNTER
Returned call to pt to give lab and x-ray results. Pt agreeable to prescription vitamin D to [facility].  Agreeable to ortho for hips. Saw Dr. Laughlin in  December for her knees.   Would like to know what she could do for her back as well please.

## 2022-11-15 ENCOUNTER — OFFICE VISIT (OUTPATIENT)
Dept: GASTROENTEROLOGY | Facility: CLINIC | Age: 62
End: 2022-11-15

## 2022-11-15 VITALS
WEIGHT: 123 LBS | BODY MASS INDEX: 21.79 KG/M2 | DIASTOLIC BLOOD PRESSURE: 87 MMHG | HEIGHT: 63 IN | SYSTOLIC BLOOD PRESSURE: 132 MMHG | HEART RATE: 79 BPM

## 2022-11-15 DIAGNOSIS — K63.5 POLYP OF ASCENDING COLON, UNSPECIFIED TYPE: Primary | ICD-10-CM

## 2022-11-15 DIAGNOSIS — Z86.010 HISTORY OF COLON POLYPS: ICD-10-CM

## 2022-11-15 PROBLEM — Z86.0100 HISTORY OF COLON POLYPS: Status: ACTIVE | Noted: 2022-11-15

## 2022-11-15 PROCEDURE — 99203 OFFICE O/P NEW LOW 30 MIN: CPT | Performed by: PHYSICIAN ASSISTANT

## 2022-11-15 NOTE — PROGRESS NOTES
Chief Complaint   Patient presents with   • Colon Cancer Screening       ENDO PROCEDURE ORDERED: COLON hx polyps, polyp ascending not removed area tattoed!!    Subjective    Jo Ann Lacy is a 62 y.o. female. she is being seen for consultation today at the request of Spring Hollins DNP, RODNEY    History of Present Illness    The patient is sent for evaluation for colon polyps.  Last seen in .  She recently saw RODNEY Epps for an annual exam 2022.  She had undergone her last colonoscopy with Dr. Brewer 2018.  She had multiple polyps removed with hemorrhoids.  She had 3 tubular adenomas removed.  He indicates in the distal ascending colon he could not remove a polyp in that area and he did tattoo it with a recommended followup colonoscopy at 1 year but the patient has had other issues and has not had that done.  She is mainly having trouble with her back and knees.  Otherwise she denied abdominal pain, heartburn, nausea, vomiting or dysphagia.  Bowel movements are regular without blood or mucus.  Her weight is down nearly 10 pounds this year from where she had been.    Laboratories 2022 showed normal CBC.  CMP showed a CO2 of 21.3, otherwise normal.  A1c of 5.4%.  Cholesterol panel showed an LDL of 107, normal TSH, vitamin D 16.4, otherwise normal.      The patient is a half pack a day smoker.  Denies alcohol or illicit substance use.  She has had knee surgery and hysterectomy, broken wrist and pneumonia. Family history of diabetes, heart disease, stroke, gallstones and hypertension.  Father  at age 78 of heart disease.  Mother age 83 has had a stroke.  She lists no spouse.  Two brothers , one from pneumonia and one from a homicide.  Three children in good health.  One child  of SIDS.    A/P:  Patient with previous adenomatous polyps with one polyp that was tattooed in the ascending colon and was not removed at that time.  The patient is overdue for a surveillance.  I am concerned  about her weight loss.  We will plan colonoscopy for the above, further pending clinical course and the results of the above.          The following portions of the patient's history were reviewed and updated as appropriate:   Past Medical History:   Diagnosis Date   • Benign polyp of large intestine    • Essential hypertension    • Gastroesophageal reflux disease    • Hypertension    • Postmenopausal state    • Tubular adenoma of colon    • Villous adenoma of colon     1.6cm VTA with intermediate grade dysplasia transverse.  1.0cm VTA with intermediate grade dysplasia ascending   • Vitamin D deficiency      Past Surgical History:   Procedure Laterality Date   • COLONOSCOPY  2016    Many 4-5 mm polyps in the transverse colon and in the ascending colon.Resected and retrieved.External and internal hemorrhoids   • HYSTERECTOMY     • KNEE SURGERY Left    • TOTAL ABDOMINAL HYSTERECTOMY WITH SALPINGO OOPHORECTOMY       Family History   Problem Relation Age of Onset   • Stroke Mother    • Cataracts Mother    • Hypertension Mother    • Heart disease Father    • Diabetes Father    • Obesity Father    • Glaucoma Father    • Hypertension Father      OB History        5    Para   4    Term   4       0    AB   1    Living   3       SAB   0    IAB   0    Ectopic   1    Molar        Multiple   0    Live Births                  No Known Allergies  Social History     Socioeconomic History   • Marital status: Single   Tobacco Use   • Smoking status: Every Day     Packs/day: 1.00     Years: 45.00     Pack years: 45.00     Types: Cigarettes     Start date:    • Smokeless tobacco: Never   Substance and Sexual Activity   • Alcohol use: No   • Drug use: No   • Sexual activity: Not Currently     Birth control/protection: Surgical     Current Medications:  Prior to Admission medications    Medication Sig Start Date End Date Taking? Authorizing Provider   amLODIPine (NORVASC) 5 MG tablet Take 1 tablet by mouth  "Daily. 8/23/22  Yes Spring Hollins DNP, APRN   cyproheptadine (PERIACTIN) 4 MG tablet Take 1 tablet by mouth 3 (Three) Times a Day. 8/23/22  Yes Spring Hollins DNP, APRN   vitamin D (ERGOCALCIFEROL) 1.25 MG (22585 UT) capsule capsule Take 1 capsule by mouth 1 (One) Time Per Week. 9/13/22  Yes Spring Hollins DNP, APRN     Review of Systems  Review of Systems   Constitutional: Negative for unexpected weight change.   HENT: Negative for trouble swallowing.    Gastrointestinal: Negative for abdominal distention, abdominal pain, anal bleeding, blood in stool, constipation, diarrhea, nausea, rectal pain and vomiting.          Objective    /87 (BP Location: Left arm)   Pulse 79   Ht 160 cm (63\")   Wt 55.8 kg (123 lb)   LMP 07/10/2017   BMI 21.79 kg/m²   Physical Exam  Vitals and nursing note reviewed.   Constitutional:       General: She is not in acute distress.     Appearance: She is well-developed.      Comments: AA   HENT:      Head: Normocephalic and atraumatic.   Eyes:      Pupils: Pupils are equal, round, and reactive to light.   Cardiovascular:      Rate and Rhythm: Normal rate and regular rhythm.      Heart sounds: Normal heart sounds.   Pulmonary:      Effort: Pulmonary effort is normal.      Breath sounds: Normal breath sounds.   Abdominal:      General: Bowel sounds are normal. There is no distension or abdominal bruit.      Palpations: Abdomen is soft. Abdomen is not rigid. There is no shifting dullness or mass.      Tenderness: There is abdominal tenderness. There is no guarding or rebound.      Hernia: No hernia is present. There is no hernia in the ventral area.   Musculoskeletal:         General: Normal range of motion.      Cervical back: Normal range of motion.   Skin:     General: Skin is warm and dry.   Neurological:      Mental Status: She is alert and oriented to person, place, and time.   Psychiatric:         Behavior: Behavior normal.         Thought Content: Thought content normal.         " Judgment: Judgment normal.       Assessment & Plan      1. Polyp of ascending colon, unspecified type    2. History of colon polyps    .   Diagnoses and all orders for this visit:    1. Polyp of ascending colon, unspecified type (Primary)  -     Case Request    2. History of colon polyps  -     Case Request    Other orders  -     polyethylene glycol (GoLYTELY) 236 g solution; Please use patient instructions given in office  Dispense: 4000 mL; Refill: 0        Orders placed during this encounter include:  No orders of the defined types were placed in this encounter.      Medications prescribed:  New Medications Ordered This Visit   Medications   • polyethylene glycol (GoLYTELY) 236 g solution     Sig: Please use patient instructions given in office     Dispense:  4000 mL     Refill:  0       Requested Prescriptions     Signed Prescriptions Disp Refills   • polyethylene glycol (GoLYTELY) 236 g solution 4000 mL 0     Sig: Please use patient instructions given in office       Review and/or summary of lab tests, radiology, procedures, medications. Review and summary of old records and obtaining of history. The risks and benefits of my recommendations, as well as other treatment options were discussed with the patient today. Questions were answered.    Follow-up: Return in about 1 month (around 12/15/2022), or if symptoms worsen or fail to improve.     COLONOSCOPY (N/A)      This document has been electronically signed by Vaibhav Olsen PA-C on November 23, 2022 16:02 CST      Results for orders placed or performed in visit on 08/23/22   CBC Auto Differential    Specimen: Blood   Result Value Ref Range    WBC 6.31 3.40 - 10.80 10*3/mm3    RBC 3.86 3.77 - 5.28 10*6/mm3    Hemoglobin 12.7 12.0 - 15.9 g/dL    Hematocrit 38.0 34.0 - 46.6 %    MCV 98.4 (H) 79.0 - 97.0 fL    MCH 32.9 26.6 - 33.0 pg    MCHC 33.4 31.5 - 35.7 g/dL    RDW 13.3 12.3 - 15.4 %    RDW-SD 48.3 37.0 - 54.0 fl    MPV 10.6 6.0 - 12.0 fL    Platelets 282  140 - 450 10*3/mm3    Neutrophil % 50.4 42.7 - 76.0 %    Lymphocyte % 37.7 19.6 - 45.3 %    Monocyte % 7.9 5.0 - 12.0 %    Eosinophil % 3.2 0.3 - 6.2 %    Basophil % 0.6 0.0 - 1.5 %    Immature Grans % 0.2 0.0 - 0.5 %    Neutrophils, Absolute 3.18 1.70 - 7.00 10*3/mm3    Lymphocytes, Absolute 2.38 0.70 - 3.10 10*3/mm3    Monocytes, Absolute 0.50 0.10 - 0.90 10*3/mm3    Eosinophils, Absolute 0.20 0.00 - 0.40 10*3/mm3    Basophils, Absolute 0.04 0.00 - 0.20 10*3/mm3    Immature Grans, Absolute 0.01 0.00 - 0.05 10*3/mm3    nRBC 0.0 0.0 - 0.2 /100 WBC   Microalbumin / Creatinine Urine Ratio - Urine, Clean Catch    Specimen: Urine, Clean Catch   Result Value Ref Range    Microalbumin/Creatinine Ratio      Creatinine, Urine 86.0 mg/dL    Microalbumin, Urine <1.2 mg/dL   Vitamin D 25 Hydroxy    Specimen: Blood   Result Value Ref Range    25 Hydroxy, Vitamin D 16.4 (L) 30.0 - 100.0 ng/ml   TSH    Specimen: Blood   Result Value Ref Range    TSH 1.130 0.270 - 4.200 uIU/mL   Hemoglobin A1c    Specimen: Blood   Result Value Ref Range    Hemoglobin A1C 5.40 4.80 - 5.60 %   Lipid Panel    Specimen: Blood   Result Value Ref Range    Total Cholesterol 190 0 - 200 mg/dL    Triglycerides 86 0 - 150 mg/dL    HDL Cholesterol 67 (H) 40 - 60 mg/dL    LDL Cholesterol  107 (H) 0 - 100 mg/dL    VLDL Cholesterol 16 5 - 40 mg/dL    LDL/HDL Ratio 1.58    Comprehensive Metabolic Panel    Specimen: Blood   Result Value Ref Range    Glucose 90 65 - 99 mg/dL    BUN 19 8 - 23 mg/dL    Creatinine 0.65 0.57 - 1.00 mg/dL    Sodium 141 136 - 145 mmol/L    Potassium 3.9 3.5 - 5.2 mmol/L    Chloride 107 98 - 107 mmol/L    CO2 21.3 (L) 22.0 - 29.0 mmol/L    Calcium 9.9 8.6 - 10.5 mg/dL    Total Protein 6.5 6.0 - 8.5 g/dL    Albumin 4.20 3.50 - 5.20 g/dL    ALT (SGPT) 14 1 - 33 U/L    AST (SGOT) 15 1 - 32 U/L    Alkaline Phosphatase 62 39 - 117 U/L    Total Bilirubin <0.2 0.0 - 1.2 mg/dL    Globulin 2.3 gm/dL    A/G Ratio 1.8 g/dL    BUN/Creatinine Ratio  29.2 (H) 7.0 - 25.0    Anion Gap 12.7 5.0 - 15.0 mmol/L    eGFR 99.1 >60.0 mL/min/1.73   Results for orders placed or performed in visit on 08/24/21   CBC Auto Differential    Specimen: Blood   Result Value Ref Range    WBC 5.58 3.40 - 10.80 10*3/mm3    RBC 3.77 3.77 - 5.28 10*6/mm3    Hemoglobin 12.0 12.0 - 15.9 g/dL    Hematocrit 36.9 34.0 - 46.6 %    MCV 97.9 (H) 79.0 - 97.0 fL    MCH 31.8 26.6 - 33.0 pg    MCHC 32.5 31.5 - 35.7 g/dL    RDW 13.1 12.3 - 15.4 %    RDW-SD 47.2 37.0 - 54.0 fl    MPV 11.0 6.0 - 12.0 fL    Platelets 244 140 - 450 10*3/mm3    Neutrophil % 47.0 42.7 - 76.0 %    Lymphocyte % 42.5 19.6 - 45.3 %    Monocyte % 7.2 5.0 - 12.0 %    Eosinophil % 2.0 0.3 - 6.2 %    Basophil % 0.9 0.0 - 1.5 %    Immature Grans % 0.4 0.0 - 0.5 %    Neutrophils, Absolute 2.63 1.70 - 7.00 10*3/mm3    Lymphocytes, Absolute 2.37 0.70 - 3.10 10*3/mm3    Monocytes, Absolute 0.40 0.10 - 0.90 10*3/mm3    Eosinophils, Absolute 0.11 0.00 - 0.40 10*3/mm3    Basophils, Absolute 0.05 0.00 - 0.20 10*3/mm3    Immature Grans, Absolute 0.02 0.00 - 0.05 10*3/mm3    nRBC 0.0 0.0 - 0.2 /100 WBC   Results for orders placed or performed in visit on 02/23/21   Vitamin D 25 Hydroxy    Specimen: Blood   Result Value Ref Range    25 Hydroxy, Vitamin D 9.7 ng/ml   T3, Free    Specimen: Blood   Result Value Ref Range    T3, Free 2.95 2.00 - 4.40 pg/mL   TSH    Specimen: Blood   Result Value Ref Range    TSH 0.711 0.270 - 4.200 uIU/mL   T4, Free    Specimen: Blood   Result Value Ref Range    Free T4 1.06 0.93 - 1.70 ng/dL   Lipid Panel    Specimen: Blood   Result Value Ref Range    Total Cholesterol 199 0 - 200 mg/dL    Triglycerides 130 0 - 150 mg/dL    HDL Cholesterol 50 40 - 60 mg/dL    LDL Cholesterol  126 (H) 0 - 100 mg/dL    VLDL Cholesterol 23 5 - 40 mg/dL    LDL/HDL Ratio 2.46    Comprehensive Metabolic Panel    Specimen: Blood   Result Value Ref Range    Glucose 94 65 - 99 mg/dL    BUN 22 8 - 23 mg/dL    Creatinine 0.76 0.57 - 1.00  mg/dL    Sodium 139 136 - 145 mmol/L    Potassium 4.1 3.5 - 5.2 mmol/L    Chloride 106 98 - 107 mmol/L    CO2 24.2 22.0 - 29.0 mmol/L    Calcium 8.8 8.6 - 10.5 mg/dL    Total Protein 7.1 6.0 - 8.5 g/dL    Albumin 4.10 3.50 - 5.20 g/dL    ALT (SGPT) 11 1 - 33 U/L    AST (SGOT) 15 1 - 32 U/L    Alkaline Phosphatase 72 39 - 117 U/L    Total Bilirubin <0.2 0.0 - 1.2 mg/dL    eGFR  African Amer 93 >60 mL/min/1.73    Globulin 3.0 gm/dL    A/G Ratio 1.4 g/dL    BUN/Creatinine Ratio 28.9 (H) 7.0 - 25.0    Anion Gap 8.8 5.0 - 15.0 mmol/L     *Note: Due to a large number of results and/or encounters for the requested time period, some results have not been displayed. A complete set of results can be found in Results Review.

## 2022-12-05 ENCOUNTER — OFFICE VISIT (OUTPATIENT)
Dept: ORTHOPEDIC SURGERY | Facility: CLINIC | Age: 62
End: 2022-12-05

## 2022-12-05 VITALS — WEIGHT: 123 LBS | BODY MASS INDEX: 21.79 KG/M2 | HEIGHT: 63 IN

## 2022-12-05 DIAGNOSIS — M25.551 BILATERAL HIP PAIN: ICD-10-CM

## 2022-12-05 DIAGNOSIS — M25.552 BILATERAL HIP PAIN: ICD-10-CM

## 2022-12-05 DIAGNOSIS — M54.50 ACUTE LOW BACK PAIN, UNSPECIFIED BACK PAIN LATERALITY, UNSPECIFIED WHETHER SCIATICA PRESENT: Primary | ICD-10-CM

## 2022-12-05 PROCEDURE — 96372 THER/PROPH/DIAG INJ SC/IM: CPT | Performed by: NURSE PRACTITIONER

## 2022-12-05 PROCEDURE — 99213 OFFICE O/P EST LOW 20 MIN: CPT | Performed by: NURSE PRACTITIONER

## 2022-12-05 RX ORDER — TIZANIDINE 4 MG/1
4 TABLET ORAL EVERY 8 HOURS PRN
Qty: 21 TABLET | Refills: 0 | Status: SHIPPED | OUTPATIENT
Start: 2022-12-05 | End: 2022-12-09

## 2022-12-05 RX ORDER — TRIAMCINOLONE ACETONIDE 40 MG/ML
80 INJECTION, SUSPENSION INTRA-ARTICULAR; INTRAMUSCULAR ONCE
Status: COMPLETED | OUTPATIENT
Start: 2022-12-05 | End: 2022-12-05

## 2022-12-05 RX ADMIN — TRIAMCINOLONE ACETONIDE 80 MG: 40 INJECTION, SUSPENSION INTRA-ARTICULAR; INTRAMUSCULAR at 10:04

## 2022-12-05 NOTE — PROGRESS NOTES
Jo Ann Lacy is a 62 y.o. female   Primary provider:  Spring Hollins, DNP, APRN       Chief Complaint   Patient presents with   • Lumbar Spine - Pain   • Left Hip - Pain   • Right Hip - Pain   • Establish Care       HISTORY OF PRESENT ILLNESS:    Mrs. Lacy is a 62-year-old female who presents today with complaints of low back pain.  No injuries or traumas.  Low back pain has been present for the past 10 months.  She denies radicular symptoms.  She denies burning, tingling, numbness.  She denies hip pain today.  She describes pain as aching.  Essentially all activity aggravates pain.  She has tried rest with some relief of symptoms.        Pain  This is a new problem. The current episode started more than 1 month ago (10 months). The problem occurs intermittently. Associated symptoms comments: aching. The symptoms are aggravated by standing and walking (sitting, driving). She has tried rest for the symptoms.        CONCURRENT MEDICAL HISTORY:    Past Medical History:   Diagnosis Date   • Benign polyp of large intestine    • Essential hypertension    • Gastroesophageal reflux disease    • Hypertension    • Postmenopausal state    • Tubular adenoma of colon    • Villous adenoma of colon     1.6cm VTA with intermediate grade dysplasia transverse.  1.0cm VTA with intermediate grade dysplasia ascending   • Vitamin D deficiency        No Known Allergies      Current Outpatient Medications:   •  polyethylene glycol (GoLYTELY) 236 g solution, Please use patient instructions given in office, Disp: 4000 mL, Rfl: 0  •  cyproheptadine (PERIACTIN) 4 MG tablet, Take 1 tablet by mouth 3 (Three) Times a Day., Disp: 90 tablet, Rfl: 1  •  tiZANidine (ZANAFLEX) 4 MG tablet, Take 1 tablet by mouth Every 8 (Eight) Hours As Needed for Muscle Spasms for up to 7 days., Disp: 21 tablet, Rfl: 0  •  vitamin D (ERGOCALCIFEROL) 1.25 MG (20773 UT) capsule capsule, Take 1 capsule by mouth 1 (One) Time Per Week., Disp: 12 capsule, Rfl: 3  No  "current facility-administered medications for this visit.    Past Surgical History:   Procedure Laterality Date   • COLONOSCOPY  05/16/2016    Many 4-5 mm polyps in the transverse colon and in the ascending colon.Resected and retrieved.External and internal hemorrhoids   • HYSTERECTOMY  2013   • KNEE SURGERY Left    • TOTAL ABDOMINAL HYSTERECTOMY WITH SALPINGO OOPHORECTOMY         Family History   Problem Relation Age of Onset   • Stroke Mother    • Cataracts Mother    • Hypertension Mother    • Heart disease Father    • Diabetes Father    • Obesity Father    • Glaucoma Father    • Hypertension Father        Social History     Socioeconomic History   • Marital status: Single   Tobacco Use   • Smoking status: Every Day     Packs/day: 1.00     Years: 45.00     Pack years: 45.00     Types: Cigarettes     Start date: 1975   • Smokeless tobacco: Never   Substance and Sexual Activity   • Alcohol use: No   • Drug use: No   • Sexual activity: Not Currently     Birth control/protection: Surgical        Review of Systems   Psychiatric/Behavioral: The patient is nervous/anxious.    All other systems reviewed and are negative.  Diffuse low back pain.    PHYSICAL EXAMINATION:       Ht 160 cm (63\")   Wt 55.8 kg (123 lb)   LMP 07/10/2017   BMI 21.79 kg/m²     Physical Exam  Vitals and nursing note reviewed.   Constitutional:       General: She is not in acute distress.     Appearance: She is well-developed. She is not toxic-appearing.   HENT:      Head: Normocephalic.   Pulmonary:      Effort: Pulmonary effort is normal. No respiratory distress.   Skin:     General: Skin is warm and dry.   Neurological:      Mental Status: She is alert and oriented to person, place, and time.   Psychiatric:         Behavior: Behavior normal.         Thought Content: Thought content normal.         Judgment: Judgment normal.         GAIT:     [x]  Normal  []  Antalgic    Assistive device: [x]  None  []  Walker     []  Crutches  []  Cane     []  " Wheelchair  []  Stretcher    Right Hip Exam     Tenderness   The patient is experiencing no tenderness.     Comments:  Negative Stinchfield      Left Hip Exam     Tenderness   The patient is experiencing no tenderness.     Comments:  Negative Stinchfield      Back Exam     Tenderness   The patient is experiencing tenderness in the lumbar (Diffuse, midline).    Comments:  Mild tenderness with palpation of spinous processes, paravertebral muscles, and muscles of low back.  No specific bony tenderness noted.                    No results found.    EXAM DESCRIPTION:  XR HIP 2 OR MORE VIEWS BILATERAL WITH  ANTEROPOSTERIOR PELVIS     CLINICAL INDICATION: lower back pain, M79.604 Pain in right leg  M79.605 Pain in left leg      COMPARISON: None     FINDINGS: Degenerative arthritis about both hips, left worse than  right. This includes near complete loss of joint space on the  left with associated subchondral sclerosis/cystic change and  marginal hypertrophic osteophytes. Joint space appears preserved  on the right, with small hypertrophic osteophytes. No acute  fracture involving the pelvis or hips. No dislocation.     IMPRESSION:  Degenerative arthritis involving the hips, left worse  than right, without evidence for acute bony abnormality.     Electronically signed by:  Ryley Teixeira MD  9/5/2022 7:25 AM CDT  Workstation: [id number]        EXAM DESCRIPTION:  XR LUMBAR SPINE 4 OR MORE VIEWS     CLINICAL INDICATION: lower back pain, M79.604 Pain in right leg  M79.605 Pain in left leg      COMPARISON: None     FINDINGS: Five lumbar type vertebral bodies demonstrate normal  height. No malalignment. There is loss of disc space height at  L4-5 and to a lesser extent at L3-4 and L5-S1. Small hypertrophic  osteophyte extending anteriorly from the mid and low lumbar  vertebral bodies. Facet articulations are aligned with moderate  facet degenerative changes L5-S1 and mild/moderate facet  degenerative change at L2-3, L3-4, and  L4-5. There is some degree  of associated bony neuroforaminal narrowing at L4-5 and L5-S1.     IMPRESSION:  1. Degenerative changes at the lumbar spine, as above and most  marked at L4-5 and L5-S1.  2. Normal vertebral body height and alignment are preserved.     Electronically signed by:  Ryley Teixeira MD  9/5/2022 7:26 AM CDT  Workstation: [id number]      ASSESSMENT:    Diagnoses and all orders for this visit:    Acute low back pain, unspecified back pain laterality, unspecified whether sciatica present  -     triamcinolone acetonide (KENALOG-40) injection 80 mg    Bilateral hip pain  -     triamcinolone acetonide (KENALOG-40) injection 80 mg    Other orders  -     tiZANidine (ZANAFLEX) 4 MG tablet; Take 1 tablet by mouth Every 8 (Eight) Hours As Needed for Muscle Spasms for up to 7 days.          PLAN    X-rays reviewed, no acute bony abnormality identified.  After discussing available treatment options including steroids, NSAIDs, physical therapy, muscle relaxers etc., patient desires to proceed with 80 IM Kenalog and Zanaflex today.  If this is ineffective patient may consider physical therapy or prescription strength NSAID in the future.  Signs and symptoms to report and when to seek care explained to patient.  Patient verbalized understanding.  If patient fails conservative management, then we may proceed with MRI in the future.  Patient also has osteoarthritis of bilateral hips no she does not have complaints of hip pain today.      Return in about 3 months (around 3/5/2023), or if symptoms worsen or fail to improve.    EMR Dragon/Transciption Disclaimer: Some of this note may be an electronic transcription/translation of spoken language to printed text.  The electronic translation of spoken language may permit erroneous, or at times, nonsensical words or phrases to be inadvertently transcribed. Although I have reviewed the note for such errors, some may still exist.     Time spent of a minimum of 30 minutes  including the face to face evaluation, reviewing of medical history and prior medial records, reviewing of diagnostic studies, ordering additional tests, documentation, patient education and coordination of care.         This document has been electronically signed by Aliyah STEVENS on December 5, 2022 10:27 CST

## 2023-01-13 ENCOUNTER — OFFICE VISIT (OUTPATIENT)
Dept: FAMILY MEDICINE CLINIC | Facility: CLINIC | Age: 63
End: 2023-01-13
Payer: MEDICAID

## 2023-01-13 ENCOUNTER — TELEPHONE (OUTPATIENT)
Dept: FAMILY MEDICINE CLINIC | Facility: CLINIC | Age: 63
End: 2023-01-13

## 2023-01-13 VITALS
SYSTOLIC BLOOD PRESSURE: 122 MMHG | HEIGHT: 63 IN | TEMPERATURE: 97.1 F | BODY MASS INDEX: 21.44 KG/M2 | DIASTOLIC BLOOD PRESSURE: 72 MMHG | HEART RATE: 94 BPM | OXYGEN SATURATION: 98 % | WEIGHT: 121 LBS

## 2023-01-13 DIAGNOSIS — R10.13 EPIGASTRIC PAIN: Primary | ICD-10-CM

## 2023-01-13 DIAGNOSIS — R12 HEARTBURN: ICD-10-CM

## 2023-01-13 DIAGNOSIS — F43.9 STRESS: ICD-10-CM

## 2023-01-13 PROCEDURE — 99214 OFFICE O/P EST MOD 30 MIN: CPT | Performed by: NURSE PRACTITIONER

## 2023-01-13 RX ORDER — OMEPRAZOLE 20 MG/1
20 CAPSULE, DELAYED RELEASE ORAL DAILY
Qty: 30 CAPSULE | Refills: 1 | Status: SHIPPED | OUTPATIENT
Start: 2023-01-13 | End: 2023-02-28 | Stop reason: SDUPTHER

## 2023-01-13 RX ORDER — SUCRALFATE 1 G/1
1 TABLET ORAL 4 TIMES DAILY
Qty: 40 TABLET | Refills: 0 | Status: SHIPPED | OUTPATIENT
Start: 2023-01-13 | End: 2023-02-28

## 2023-01-13 NOTE — PROGRESS NOTES
"Chief Complaint  Abdominal Pain (Patient thinks she has a ulcer she has been under a lot of stress due to her mother passing away 12/14/22 and she said she has a lot of heartburn, no diarrhea no nausea, no vomiting and no constipation, she said she eats to see if itll help it goes away for a few minutes then comes back . She said it feels like a knot in her upper abdomin near sternum)    Subjective          Jo Ann Lacy presents to [facility] [place]    History of Present Illness  [facility]    PCP: RODNEY Ignacio    CC: \"abdominal pain\"    Reports increased stress, dealing with death of mother.  Has been having increased heartburn, upper abdominal pain into sternum.  Reports ? Hx of ulcers under stress in the past.  Reports if she eats, gets temporary relief only.  Has been afraid to eat much, mostly doing soups.  Hx of EGD years ago, does have upcoming colonoscopy due to hx of polyps, adenoma.  Denies weight loss, n/v, diarrhea, constipation or melena.  Smoker. Reports she has anxiety meds from PCP, but has never taken.  Chart review noted previous Rx for hydroxyzine.   Abdominal Pain  This is a new problem. The current episode started 1 to 4 weeks ago (consistently for 2 weeks, on/off prior). The onset quality is gradual. The problem occurs constantly. The problem has been unchanged. The pain is located in the epigastric region. The pain is at a severity of 6/10. The quality of the pain is aching, burning and a sensation of fullness. The abdominal pain does not radiate. Associated symptoms include anorexia. Pertinent negatives include no arthralgias, belching, constipation, diarrhea, dysuria, fever, flatus, frequency, headaches, hematochezia, hematuria, melena, myalgias, nausea, vomiting or weight loss. Associated symptoms comments: +heartburn  . Exacerbated by: stress makes it work, empty stomach. Relieved by: eating helps for a few minutes only. " "Treatments tried: occasional alkaseltzer. The treatment provided mild relief. Her past medical history is significant for abdominal surgery (hysterectomy) and GERD. History of colon cancer:  polyps, tubular adenoma.       Review of Systems   Constitutional: Positive for appetite change. Negative for fever and weight loss.   HENT: Negative.    Eyes: Negative.    Respiratory: Negative.    Cardiovascular: Negative.    Gastrointestinal: Positive for abdominal pain and anorexia. Negative for blood in stool, constipation, diarrhea, flatus, hematochezia, melena, nausea and vomiting.        +heartburn     Genitourinary: Negative.  Negative for dysuria, frequency and hematuria.   Musculoskeletal: Negative.  Negative for arthralgias and myalgias.   Skin: Negative.    Neurological: Negative for dizziness, light-headedness and headaches.   Psychiatric/Behavioral: Positive for sleep disturbance. Negative for self-injury and suicidal ideas. The patient is nervous/anxious.         +stress          Objective   Vital Signs:   /72 (BP Location: Right arm, Patient Position: Sitting, Cuff Size: Adult)   Pulse 94   Temp 97.1 °F (36.2 °C)   Ht 160 cm (63\")   Wt 54.9 kg (121 lb)   SpO2 98%   BMI 21.43 kg/m²       Physical Exam  Vitals and nursing note reviewed.   Constitutional:       General: She is not in acute distress.     Appearance: She is not ill-appearing.   HENT:      Head: Normocephalic and atraumatic.      Mouth/Throat:      Mouth: Mucous membranes are moist.      Pharynx: Oropharynx is clear. No oropharyngeal exudate or posterior oropharyngeal erythema.   Eyes:      General: No scleral icterus.     Conjunctiva/sclera: Conjunctivae normal.   Cardiovascular:      Rate and Rhythm: Normal rate and regular rhythm.   Pulmonary:      Effort: Pulmonary effort is normal. No respiratory distress.      Breath sounds: Normal breath sounds. No wheezing, rhonchi or rales.   Abdominal:      General: Bowel sounds are normal.      " Palpations: Abdomen is soft.      Tenderness: There is abdominal tenderness. There is no right CVA tenderness, left CVA tenderness, guarding or rebound.   Musculoskeletal:      Cervical back: Neck supple. No tenderness.   Lymphadenopathy:      Cervical: No cervical adenopathy.   Skin:     General: Skin is warm and dry.   Neurological:      General: No focal deficit present.      Mental Status: She is alert and oriented to person, place, and time.   Psychiatric:         Attention and Perception: Attention normal.         Mood and Affect: Mood is anxious.         Speech: Speech normal.         Behavior: Behavior is cooperative.         Thought Content: Thought content normal.         Cognition and Memory: Cognition normal.          Result Review :     Common labs    Common Labs 9/2/22 9/2/22 9/2/22 9/2/22 9/2/22    1121 1121 1121 1121 1157   Glucose   90     BUN   19     Creatinine   0.65     Sodium   141     Potassium   3.9     Chloride   107     Calcium   9.9     Albumin   4.20     Total Bilirubin   <0.2     Alkaline Phosphatase   62     AST (SGOT)   15     ALT (SGPT)   14     WBC 6.31       Hemoglobin 12.7       Hematocrit 38.0       Platelets 282       Total Cholesterol    190    Triglycerides    86    HDL Cholesterol    67 (A)    LDL Cholesterol     107 (A)    Hemoglobin A1C  5.40      Microalbumin, Urine     <1.2   (A) Abnormal value                      Assessment and Plan    Diagnoses and all orders for this visit:    1. Epigastric pain (Primary)  -     omeprazole (priLOSEC) 20 MG capsule; Take 1 capsule by mouth Daily.  Dispense: 30 capsule; Refill: 1  -     sucralfate (Carafate) 1 g tablet; Take 1 tablet by mouth 4 (Four) Times a Day.  Dispense: 40 tablet; Refill: 0  -     CBC No Differential; Future  -     Comprehensive metabolic panel; Future  -     Amylase; Future  -     Lipase; Future  -     Helicobacter Pylori, IgA IgG IgM; Future    2. Heartburn  -     omeprazole (priLOSEC) 20 MG capsule; Take 1  capsule by mouth Daily.  Dispense: 30 capsule; Refill: 1  -     sucralfate (Carafate) 1 g tablet; Take 1 tablet by mouth 4 (Four) Times a Day.  Dispense: 40 tablet; Refill: 0  -     CBC No Differential; Future  -     Comprehensive metabolic panel; Future  -     Amylase; Future  -     Lipase; Future  -     Helicobacter Pylori, IgA IgG IgM; Future    3. Stress      Broussard, BRAT diet  Rx for Omeprazole to begin daily for 6-8 weeks  Rx for Carafate x 10 days  Labs as above--will notify with results when available  Has colonoscopy scheduled for 2/1, if no improvement in symptoms, may need to consider Gastro consult to consider EGD    Has Hydroxyzine at home to take as needed for anxiety  Stress relieving techniques encouraged    RTW: 1-    See PCP or RTC if symptoms persist/worsen  See PCP for routine f/u visit and management of chronic medical conditions      This document has been electronically signed by RODNEY Peña on January 13, 2023 17:34 CST,.    I spent 38 minutes caring for Jo Ann on this date of service. This time includes time spent by me in the following activities:preparing for the visit, reviewing tests, performing a medically appropriate examination and/or evaluation , counseling and educating the patient/family/caregiver, ordering medications, tests, or procedures and documenting information in the medical record

## 2023-01-13 NOTE — LETTER
January 13, 2023     Patient: Jo Ann Lacy   YOB: 1960   Date of Visit: 1/13/2023       To Whom It May Concern:    It is my medical opinion that Jo Ann Lacy should remain out of work until 1-.           Sincerely,      This document has been electronically signed by RODNEY Peña on January 13, 2023 16:59 CST,.      RODNEY Peña    CC: No Recipients

## 2023-01-13 NOTE — TELEPHONE ENCOUNTER
CALLED TO SEE IF THE PATIENT COULD COME IN EARLIER AS THE LAB CLOSES AT 4 TODAY. NO ANSWER LEFT MESSAGE

## 2023-01-16 ENCOUNTER — LAB (OUTPATIENT)
Dept: LAB | Facility: HOSPITAL | Age: 63
End: 2023-01-16
Payer: MEDICAID

## 2023-01-16 DIAGNOSIS — R10.13 EPIGASTRIC PAIN: ICD-10-CM

## 2023-01-16 DIAGNOSIS — R12 HEARTBURN: ICD-10-CM

## 2023-01-16 PROCEDURE — 83690 ASSAY OF LIPASE: CPT

## 2023-01-16 PROCEDURE — 82150 ASSAY OF AMYLASE: CPT

## 2023-01-16 PROCEDURE — 80053 COMPREHEN METABOLIC PANEL: CPT

## 2023-01-16 PROCEDURE — 85027 COMPLETE CBC AUTOMATED: CPT

## 2023-01-16 PROCEDURE — 86677 HELICOBACTER PYLORI ANTIBODY: CPT

## 2023-01-17 LAB
ALBUMIN SERPL-MCNC: 4.5 G/DL (ref 3.5–5.2)
ALBUMIN/GLOB SERPL: 1.7 G/DL
ALP SERPL-CCNC: 60 U/L (ref 39–117)
ALT SERPL W P-5'-P-CCNC: 14 U/L (ref 1–33)
ANION GAP SERPL CALCULATED.3IONS-SCNC: 10.3 MMOL/L (ref 5–15)
AST SERPL-CCNC: 16 U/L (ref 1–32)
BILIRUB SERPL-MCNC: <0.2 MG/DL (ref 0–1.2)
BUN SERPL-MCNC: 14 MG/DL (ref 8–23)
BUN/CREAT SERPL: 21.5 (ref 7–25)
CALCIUM SPEC-SCNC: 9.9 MG/DL (ref 8.6–10.5)
CHLORIDE SERPL-SCNC: 104 MMOL/L (ref 98–107)
CO2 SERPL-SCNC: 23.7 MMOL/L (ref 22–29)
CREAT SERPL-MCNC: 0.65 MG/DL (ref 0.57–1)
DEPRECATED RDW RBC AUTO: 45.6 FL (ref 37–54)
EGFRCR SERPLBLD CKD-EPI 2021: 98.5 ML/MIN/1.73
ERYTHROCYTE [DISTWIDTH] IN BLOOD BY AUTOMATED COUNT: 13.3 % (ref 12.3–15.4)
GLOBULIN UR ELPH-MCNC: 2.6 GM/DL
GLUCOSE SERPL-MCNC: 94 MG/DL (ref 65–99)
HCT VFR BLD AUTO: 38.8 % (ref 34–46.6)
HGB BLD-MCNC: 13.6 G/DL (ref 12–15.9)
MCH RBC QN AUTO: 33 PG (ref 26.6–33)
MCHC RBC AUTO-ENTMCNC: 35.1 G/DL (ref 31.5–35.7)
MCV RBC AUTO: 94.2 FL (ref 79–97)
PLATELET # BLD AUTO: 268 10*3/MM3 (ref 140–450)
PMV BLD AUTO: 9.8 FL (ref 6–12)
POTASSIUM SERPL-SCNC: 4.2 MMOL/L (ref 3.5–5.2)
PROT SERPL-MCNC: 7.1 G/DL (ref 6–8.5)
RBC # BLD AUTO: 4.12 10*6/MM3 (ref 3.77–5.28)
SODIUM SERPL-SCNC: 138 MMOL/L (ref 136–145)
WBC NRBC COR # BLD: 5.2 10*3/MM3 (ref 3.4–10.8)

## 2023-01-18 LAB
AMYLASE SERPL-CCNC: 59 U/L (ref 28–100)
H PYLORI IGA SER-ACNC: <9 UNITS (ref 0–8.9)
H PYLORI IGG SER IA-ACNC: 0.23 INDEX VALUE (ref 0–0.79)
H PYLORI IGM SER-ACNC: <9 UNITS (ref 0–8.9)
LIPASE SERPL-CCNC: 133 U/L (ref 13–60)

## 2023-02-07 ENCOUNTER — OFFICE VISIT (OUTPATIENT)
Dept: GASTROENTEROLOGY | Facility: CLINIC | Age: 63
End: 2023-02-07
Payer: MEDICAID

## 2023-02-07 VITALS
HEART RATE: 74 BPM | BODY MASS INDEX: 22.5 KG/M2 | WEIGHT: 127 LBS | SYSTOLIC BLOOD PRESSURE: 126 MMHG | OXYGEN SATURATION: 99 % | HEIGHT: 63 IN | DIASTOLIC BLOOD PRESSURE: 64 MMHG

## 2023-02-07 DIAGNOSIS — Z86.010 HISTORY OF COLON POLYPS: ICD-10-CM

## 2023-02-07 DIAGNOSIS — K63.5 POLYP OF ASCENDING COLON, UNSPECIFIED TYPE: Primary | ICD-10-CM

## 2023-02-07 PROCEDURE — 99213 OFFICE O/P EST LOW 20 MIN: CPT | Performed by: PHYSICIAN ASSISTANT

## 2023-02-07 RX ORDER — AMLODIPINE BESYLATE 5 MG/1
5 TABLET ORAL DAILY
COMMUNITY
End: 2023-02-28 | Stop reason: SDUPTHER

## 2023-02-07 RX ORDER — DEXTROSE AND SODIUM CHLORIDE 5; .45 G/100ML; G/100ML
30 INJECTION, SOLUTION INTRAVENOUS CONTINUOUS PRN
Status: CANCELLED | OUTPATIENT
Start: 2023-03-10

## 2023-02-07 NOTE — PROGRESS NOTES
Chief Complaint   Patient presents with   • Follow-up     2 Month   • ColonJoses     Was unable to have Colonoscopy done       ENDO PROCEDURE ORDERED: COLON hx polyp, tatoed incomplete resection polyp distal ascending    Subjective    Jo Ann Lacy is a 62 y.o. female. she is here today for follow-up.    History of Present Illness    Patient is seen on a recheck of her colon polyps, abdominal pain. Last seen on 11/15/2022. She states she has had trouble with her mother and weather. She has not been able to get transportation to have a repeat colonoscopy, she states. She will not go see anyone at [facility]. She had a previous colonoscopy on 04/09/2018 with 3 tubular adenomas removed. There was a polyp in the distal ascending that was not completely removed. It was tattooed with a recommended repeat colonoscopy at 1 year but she has not yet done that. She currently denies abdominal pain, heartburn, nausea, vomiting or dysphagia. Bowels are moving without blood or mucus. Weight is up 4 pounds since last visit. Last laboratories on 01/16/2023 showed normal CBC, CMP. Lipase 133. Normal amylase. H. pylori negative.     ASSESSMENT/PLAN:  Patient with high risk for recurrent polyps overdue for surveillance colonoscopy. She states she can get transportation now. Was encouraged to keep this study done. We will plan follow-up after the above. Last LFTs normal. Further pending clinical course and the results of the above.      The following portions of the patient's history were reviewed and updated as appropriate:   Past Medical History:   Diagnosis Date   • Benign polyp of large intestine    • Essential hypertension    • Gastroesophageal reflux disease    • Hypertension    • Postmenopausal state    • Tubular adenoma of colon    • Villous adenoma of colon     1.6cm VTA with intermediate grade dysplasia transverse.  1.0cm VTA with intermediate grade dysplasia ascending   • Vitamin D deficiency      Past  Surgical History:   Procedure Laterality Date   • COLONOSCOPY  2016    Many 4-5 mm polyps in the transverse colon and in the ascending colon.Resected and retrieved.External and internal hemorrhoids   • HYSTERECTOMY     • KNEE SURGERY Left    • TOTAL ABDOMINAL HYSTERECTOMY WITH SALPINGO OOPHORECTOMY       Family History   Problem Relation Age of Onset   • Stroke Mother    • Cataracts Mother    • Hypertension Mother    • Heart disease Father    • Diabetes Father    • Obesity Father    • Glaucoma Father    • Hypertension Father      OB History        5    Para   4    Term   4       0    AB   1    Living   3       SAB   0    IAB   0    Ectopic   1    Molar        Multiple   0    Live Births                  No Known Allergies  Social History     Socioeconomic History   • Marital status: Single   Tobacco Use   • Smoking status: Every Day     Packs/day: 1.00     Years: 45.00     Pack years: 45.00     Types: Cigarettes     Start date:    • Smokeless tobacco: Never   Vaping Use   • Vaping Use: Never used   Substance and Sexual Activity   • Alcohol use: No   • Drug use: No   • Sexual activity: Not Currently     Birth control/protection: Surgical     Current Medications:  Prior to Admission medications    Medication Sig Start Date End Date Taking? Authorizing Provider   omeprazole (priLOSEC) 20 MG capsule Take 1 capsule by mouth Daily. 23  Yes Atilio Carlos APRN   sucralfate (Carafate) 1 g tablet Take 1 tablet by mouth 4 (Four) Times a Day. 23  Yes Atilio Carlos APRN   amLODIPine (NORVASC) 5 MG tablet Take 5 mg by mouth Daily.    Provider, MD Aquilino   polyethylene glycol (GoLYTELY) 236 g solution Please use patient instructions given in office 11/15/22   Vaibhav Olsen PA-C   vitamin D (ERGOCALCIFEROL) 1.25 MG (11938 UT) capsule capsule Take 1 capsule by mouth 1 (One) Time Per Week. 22   Spring Hollins, DNP, APRN     Review of Systems  Review of Systems   Constitutional:  "Negative for unexpected weight change.   HENT: Negative for trouble swallowing.    Gastrointestinal: Positive for abdominal pain. Negative for anal bleeding, blood in stool, constipation, diarrhea, nausea, rectal pain and vomiting.          Objective    /64 (BP Location: Left arm, Patient Position: Sitting)   Pulse 74   Ht 160 cm (63\")   Wt 57.6 kg (127 lb)   LMP 07/10/2017   SpO2 99%   BMI 22.50 kg/m²   Physical Exam  Vitals and nursing note reviewed.   Constitutional:       General: She is not in acute distress.     Appearance: She is well-developed.   HENT:      Head: Normocephalic and atraumatic.   Eyes:      Pupils: Pupils are equal, round, and reactive to light.   Cardiovascular:      Rate and Rhythm: Normal rate and regular rhythm.      Heart sounds: Normal heart sounds.   Pulmonary:      Effort: Pulmonary effort is normal.      Breath sounds: Normal breath sounds.   Abdominal:      General: Bowel sounds are normal. There is no distension or abdominal bruit.      Palpations: Abdomen is soft. Abdomen is not rigid. There is no shifting dullness or mass.      Tenderness: There is abdominal tenderness. There is no guarding or rebound.      Hernia: No hernia is present. There is no hernia in the ventral area.   Musculoskeletal:         General: Normal range of motion.      Cervical back: Normal range of motion.   Skin:     General: Skin is warm and dry.   Neurological:      Mental Status: She is alert and oriented to person, place, and time.   Psychiatric:         Behavior: Behavior normal.         Thought Content: Thought content normal.         Judgment: Judgment normal.       Assessment & Plan      1. Polyp of ascending colon, unspecified type    2. History of colon polyps    .   Diagnoses and all orders for this visit:    1. Polyp of ascending colon, unspecified type (Primary)  -     Case Request; Standing  -     dextrose 5 % and sodium chloride 0.45 % infusion  -     Case Request    2. History of " colon polyps  -     Case Request; Standing  -     dextrose 5 % and sodium chloride 0.45 % infusion  -     Case Request    Other orders  -     Follow Anesthesia Guidelines / Protocol; Future  -     Obtain Informed Consent; Future  -     Obtain Informed Consent; Standing  -     POC Glucose Once; Standing  -     polyethylene glycol (GoLYTELY) 236 g solution; Please use instructions given in office.  Dispense: 4000 mL; Refill: 0        Orders placed during this encounter include:  Orders Placed This Encounter   Procedures   • Obtain Informed Consent     Standing Status:   Future     Order Specific Question:   Informed Consent Given For     Answer:   COLONOSCOPY       Medications prescribed:  New Medications Ordered This Visit   Medications   • polyethylene glycol (GoLYTELY) 236 g solution     Sig: Please use instructions given in office.     Dispense:  4000 mL     Refill:  0       Requested Prescriptions     Signed Prescriptions Disp Refills   • polyethylene glycol (GoLYTELY) 236 g solution 4000 mL 0     Sig: Please use instructions given in office.       Review and/or summary of lab tests, radiology, procedures, medications. Review and summary of old records and obtaining of history. The risks and benefits of my recommendations, as well as other treatment options were discussed with the patient today. Questions were answered.    Follow-up: Return if symptoms worsen or fail to improve, for After the above.     COLONOSCOPY (N/A)      This document has been electronically signed by Vaibhav Olsen PA-C on February 20, 2023 18:45 CST      Results for orders placed or performed in visit on 01/16/23   Helicobacter Pylori, IgA IgG IgM    Specimen: Blood   Result Value Ref Range    H. pylori IgG 0.23 0.00 - 0.79 Index Value    H. pylori, IgA ABS <9.0 0.0 - 8.9 units    H. Pylori, IgM <9.0 0.0 - 8.9 units   CBC No Differential    Specimen: Blood   Result Value Ref Range    WBC 5.20 3.40 - 10.80 10*3/mm3    RBC 4.12 3.77 -  5.28 10*6/mm3    Hemoglobin 13.6 12.0 - 15.9 g/dL    Hematocrit 38.8 34.0 - 46.6 %    MCV 94.2 79.0 - 97.0 fL    MCH 33.0 26.6 - 33.0 pg    MCHC 35.1 31.5 - 35.7 g/dL    RDW 13.3 12.3 - 15.4 %    RDW-SD 45.6 37.0 - 54.0 fl    MPV 9.8 6.0 - 12.0 fL    Platelets 268 140 - 450 10*3/mm3   Lipase    Specimen: Blood   Result Value Ref Range    Lipase 133 (H) 13 - 60 U/L   Amylase    Specimen: Blood   Result Value Ref Range    Amylase 59 28 - 100 U/L   Comprehensive metabolic panel    Specimen: Blood   Result Value Ref Range    Glucose 94 65 - 99 mg/dL    BUN 14 8 - 23 mg/dL    Creatinine 0.65 0.57 - 1.00 mg/dL    Sodium 138 136 - 145 mmol/L    Potassium 4.2 3.5 - 5.2 mmol/L    Chloride 104 98 - 107 mmol/L    CO2 23.7 22.0 - 29.0 mmol/L    Calcium 9.9 8.6 - 10.5 mg/dL    Total Protein 7.1 6.0 - 8.5 g/dL    Albumin 4.5 3.5 - 5.2 g/dL    ALT (SGPT) 14 1 - 33 U/L    AST (SGOT) 16 1 - 32 U/L    Alkaline Phosphatase 60 39 - 117 U/L    Total Bilirubin <0.2 0.0 - 1.2 mg/dL    Globulin 2.6 gm/dL    A/G Ratio 1.7 g/dL    BUN/Creatinine Ratio 21.5 7.0 - 25.0    Anion Gap 10.3 5.0 - 15.0 mmol/L    eGFR 98.5 >60.0 mL/min/1.73   Results for orders placed or performed in visit on 08/23/22   CBC Auto Differential    Specimen: Blood   Result Value Ref Range    WBC 6.31 3.40 - 10.80 10*3/mm3    RBC 3.86 3.77 - 5.28 10*6/mm3    Hemoglobin 12.7 12.0 - 15.9 g/dL    Hematocrit 38.0 34.0 - 46.6 %    MCV 98.4 (H) 79.0 - 97.0 fL    MCH 32.9 26.6 - 33.0 pg    MCHC 33.4 31.5 - 35.7 g/dL    RDW 13.3 12.3 - 15.4 %    RDW-SD 48.3 37.0 - 54.0 fl    MPV 10.6 6.0 - 12.0 fL    Platelets 282 140 - 450 10*3/mm3    Neutrophil % 50.4 42.7 - 76.0 %    Lymphocyte % 37.7 19.6 - 45.3 %    Monocyte % 7.9 5.0 - 12.0 %    Eosinophil % 3.2 0.3 - 6.2 %    Basophil % 0.6 0.0 - 1.5 %    Immature Grans % 0.2 0.0 - 0.5 %    Neutrophils, Absolute 3.18 1.70 - 7.00 10*3/mm3    Lymphocytes, Absolute 2.38 0.70 - 3.10 10*3/mm3    Monocytes, Absolute 0.50 0.10 - 0.90 10*3/mm3     Eosinophils, Absolute 0.20 0.00 - 0.40 10*3/mm3    Basophils, Absolute 0.04 0.00 - 0.20 10*3/mm3    Immature Grans, Absolute 0.01 0.00 - 0.05 10*3/mm3    nRBC 0.0 0.0 - 0.2 /100 WBC   Microalbumin / Creatinine Urine Ratio - Urine, Clean Catch    Specimen: Urine, Clean Catch   Result Value Ref Range    Microalbumin/Creatinine Ratio      Creatinine, Urine 86.0 mg/dL    Microalbumin, Urine <1.2 mg/dL   Vitamin D 25 Hydroxy    Specimen: Blood   Result Value Ref Range    25 Hydroxy, Vitamin D 16.4 (L) 30.0 - 100.0 ng/ml   TSH    Specimen: Blood   Result Value Ref Range    TSH 1.130 0.270 - 4.200 uIU/mL   Hemoglobin A1c    Specimen: Blood   Result Value Ref Range    Hemoglobin A1C 5.40 4.80 - 5.60 %   Lipid Panel    Specimen: Blood   Result Value Ref Range    Total Cholesterol 190 0 - 200 mg/dL    Triglycerides 86 0 - 150 mg/dL    HDL Cholesterol 67 (H) 40 - 60 mg/dL    LDL Cholesterol  107 (H) 0 - 100 mg/dL    VLDL Cholesterol 16 5 - 40 mg/dL    LDL/HDL Ratio 1.58    Comprehensive Metabolic Panel    Specimen: Blood   Result Value Ref Range    Glucose 90 65 - 99 mg/dL    BUN 19 8 - 23 mg/dL    Creatinine 0.65 0.57 - 1.00 mg/dL    Sodium 141 136 - 145 mmol/L    Potassium 3.9 3.5 - 5.2 mmol/L    Chloride 107 98 - 107 mmol/L    CO2 21.3 (L) 22.0 - 29.0 mmol/L    Calcium 9.9 8.6 - 10.5 mg/dL    Total Protein 6.5 6.0 - 8.5 g/dL    Albumin 4.20 3.50 - 5.20 g/dL    ALT (SGPT) 14 1 - 33 U/L    AST (SGOT) 15 1 - 32 U/L    Alkaline Phosphatase 62 39 - 117 U/L    Total Bilirubin <0.2 0.0 - 1.2 mg/dL    Globulin 2.3 gm/dL    A/G Ratio 1.8 g/dL    BUN/Creatinine Ratio 29.2 (H) 7.0 - 25.0    Anion Gap 12.7 5.0 - 15.0 mmol/L    eGFR 99.1 >60.0 mL/min/1.73   Results for orders placed or performed in visit on 08/24/21   CBC Auto Differential    Specimen: Blood   Result Value Ref Range    WBC 5.58 3.40 - 10.80 10*3/mm3    RBC 3.77 3.77 - 5.28 10*6/mm3    Hemoglobin 12.0 12.0 - 15.9 g/dL    Hematocrit 36.9 34.0 - 46.6 %    MCV 97.9 (H) 79.0  - 97.0 fL    MCH 31.8 26.6 - 33.0 pg    MCHC 32.5 31.5 - 35.7 g/dL    RDW 13.1 12.3 - 15.4 %    RDW-SD 47.2 37.0 - 54.0 fl    MPV 11.0 6.0 - 12.0 fL    Platelets 244 140 - 450 10*3/mm3    Neutrophil % 47.0 42.7 - 76.0 %    Lymphocyte % 42.5 19.6 - 45.3 %    Monocyte % 7.2 5.0 - 12.0 %    Eosinophil % 2.0 0.3 - 6.2 %    Basophil % 0.9 0.0 - 1.5 %    Immature Grans % 0.4 0.0 - 0.5 %    Neutrophils, Absolute 2.63 1.70 - 7.00 10*3/mm3    Lymphocytes, Absolute 2.37 0.70 - 3.10 10*3/mm3    Monocytes, Absolute 0.40 0.10 - 0.90 10*3/mm3    Eosinophils, Absolute 0.11 0.00 - 0.40 10*3/mm3    Basophils, Absolute 0.05 0.00 - 0.20 10*3/mm3    Immature Grans, Absolute 0.02 0.00 - 0.05 10*3/mm3    nRBC 0.0 0.0 - 0.2 /100 WBC     *Note: Due to a large number of results and/or encounters for the requested time period, some results have not been displayed. A complete set of results can be found in Results Review.

## 2023-02-28 ENCOUNTER — OFFICE VISIT (OUTPATIENT)
Dept: FAMILY MEDICINE CLINIC | Facility: CLINIC | Age: 63
End: 2023-02-28
Payer: MEDICAID

## 2023-02-28 VITALS
OXYGEN SATURATION: 98 % | RESPIRATION RATE: 20 BRPM | HEIGHT: 63 IN | WEIGHT: 127 LBS | BODY MASS INDEX: 22.5 KG/M2 | TEMPERATURE: 97.4 F | SYSTOLIC BLOOD PRESSURE: 120 MMHG | DIASTOLIC BLOOD PRESSURE: 80 MMHG | HEART RATE: 88 BPM

## 2023-02-28 DIAGNOSIS — E55.9 VITAMIN D DEFICIENCY: Chronic | ICD-10-CM

## 2023-02-28 DIAGNOSIS — E78.5 DYSLIPIDEMIA: Primary | Chronic | ICD-10-CM

## 2023-02-28 DIAGNOSIS — G47.09 OTHER INSOMNIA: Chronic | ICD-10-CM

## 2023-02-28 DIAGNOSIS — R12 HEARTBURN: Chronic | ICD-10-CM

## 2023-02-28 DIAGNOSIS — I10 ESSENTIAL HYPERTENSION: Chronic | ICD-10-CM

## 2023-02-28 DIAGNOSIS — F43.9 STRESS: Chronic | ICD-10-CM

## 2023-02-28 PROCEDURE — 3074F SYST BP LT 130 MM HG: CPT | Performed by: NURSE PRACTITIONER

## 2023-02-28 PROCEDURE — 99214 OFFICE O/P EST MOD 30 MIN: CPT | Performed by: NURSE PRACTITIONER

## 2023-02-28 PROCEDURE — 3079F DIAST BP 80-89 MM HG: CPT | Performed by: NURSE PRACTITIONER

## 2023-02-28 PROCEDURE — 1160F RVW MEDS BY RX/DR IN RCRD: CPT | Performed by: NURSE PRACTITIONER

## 2023-02-28 PROCEDURE — 1159F MED LIST DOCD IN RCRD: CPT | Performed by: NURSE PRACTITIONER

## 2023-02-28 RX ORDER — ROSUVASTATIN CALCIUM 20 MG/1
20 TABLET, COATED ORAL NIGHTLY
Qty: 90 TABLET | Refills: 1 | Status: SHIPPED | OUTPATIENT
Start: 2023-02-28

## 2023-02-28 RX ORDER — TRAZODONE HYDROCHLORIDE 50 MG/1
50 TABLET ORAL NIGHTLY
Qty: 90 TABLET | Refills: 1 | Status: SHIPPED | OUTPATIENT
Start: 2023-02-28

## 2023-02-28 RX ORDER — ERGOCALCIFEROL 1.25 MG/1
50000 CAPSULE ORAL WEEKLY
Qty: 12 CAPSULE | Refills: 3 | Status: SHIPPED | OUTPATIENT
Start: 2023-02-28

## 2023-02-28 RX ORDER — AMLODIPINE BESYLATE 5 MG/1
5 TABLET ORAL DAILY
Qty: 90 TABLET | Refills: 1 | Status: SHIPPED | OUTPATIENT
Start: 2023-02-28

## 2023-02-28 RX ORDER — OMEPRAZOLE 20 MG/1
20 CAPSULE, DELAYED RELEASE ORAL DAILY
Qty: 30 CAPSULE | Refills: 1 | Status: SHIPPED | OUTPATIENT
Start: 2023-02-28

## 2023-03-10 ENCOUNTER — HOSPITAL ENCOUNTER (EMERGENCY)
Facility: HOSPITAL | Age: 63
Discharge: HOME | End: 2023-03-10
Attending: STUDENT IN AN ORGANIZED HEALTH CARE EDUCATION/TRAINING PROGRAM | Admitting: STUDENT IN AN ORGANIZED HEALTH CARE EDUCATION/TRAINING PROGRAM
Payer: MEDICAID

## 2023-03-10 ENCOUNTER — APPOINTMENT (OUTPATIENT)
Dept: GENERAL RADIOLOGY | Facility: HOSPITAL | Age: 63
End: 2023-03-10
Payer: MEDICAID

## 2023-03-10 ENCOUNTER — ANESTHESIA EVENT (OUTPATIENT)
Dept: GASTROENTEROLOGY | Facility: HOSPITAL | Age: 63
End: 2023-03-10
Payer: MEDICAID

## 2023-03-10 ENCOUNTER — HOSPITAL ENCOUNTER (OUTPATIENT)
Facility: HOSPITAL | Age: 63
Setting detail: HOSPITAL OUTPATIENT SURGERY
Discharge: HOME | End: 2023-03-10
Attending: SURGERY | Admitting: PHYSICIAN ASSISTANT
Payer: MEDICAID

## 2023-03-10 ENCOUNTER — APPOINTMENT (OUTPATIENT)
Dept: CT IMAGING | Facility: HOSPITAL | Age: 63
End: 2023-03-10
Payer: MEDICAID

## 2023-03-10 ENCOUNTER — ANESTHESIA (OUTPATIENT)
Dept: GASTROENTEROLOGY | Facility: HOSPITAL | Age: 63
End: 2023-03-10
Payer: MEDICAID

## 2023-03-10 VITALS
TEMPERATURE: 98 F | HEART RATE: 80 BPM | SYSTOLIC BLOOD PRESSURE: 156 MMHG | DIASTOLIC BLOOD PRESSURE: 88 MMHG | OXYGEN SATURATION: 94 % | RESPIRATION RATE: 18 BRPM | HEIGHT: 63 IN | WEIGHT: 125 LBS | BODY MASS INDEX: 22.15 KG/M2

## 2023-03-10 VITALS
SYSTOLIC BLOOD PRESSURE: 150 MMHG | TEMPERATURE: 96.4 F | HEIGHT: 63 IN | OXYGEN SATURATION: 98 % | RESPIRATION RATE: 18 BRPM | HEART RATE: 63 BPM | DIASTOLIC BLOOD PRESSURE: 81 MMHG | WEIGHT: 124.2 LBS | BODY MASS INDEX: 22.01 KG/M2

## 2023-03-10 DIAGNOSIS — K63.5 POLYP OF ASCENDING COLON, UNSPECIFIED TYPE: ICD-10-CM

## 2023-03-10 DIAGNOSIS — Z86.010 HISTORY OF COLON POLYPS: ICD-10-CM

## 2023-03-10 DIAGNOSIS — T78.40XA ALLERGIC REACTION, INITIAL ENCOUNTER: Primary | ICD-10-CM

## 2023-03-10 PROCEDURE — 96374 THER/PROPH/DIAG INJ IV PUSH: CPT

## 2023-03-10 PROCEDURE — 25010000002 METHYLPREDNISOLONE PER 125 MG: Performed by: STUDENT IN AN ORGANIZED HEALTH CARE EDUCATION/TRAINING PROGRAM

## 2023-03-10 PROCEDURE — 25010000002 DIPHENHYDRAMINE PER 50 MG: Performed by: STUDENT IN AN ORGANIZED HEALTH CARE EDUCATION/TRAINING PROGRAM

## 2023-03-10 PROCEDURE — S0260 H&P FOR SURGERY: HCPCS | Performed by: SURGERY

## 2023-03-10 PROCEDURE — 99282 EMERGENCY DEPT VISIT SF MDM: CPT

## 2023-03-10 PROCEDURE — 25010000002 EPINEPHRINE PER 0.1 MG: Performed by: SURGERY

## 2023-03-10 PROCEDURE — 25510000001 IOPAMIDOL 61 % SOLUTION: Performed by: SURGERY

## 2023-03-10 PROCEDURE — 74177 CT ABD & PELVIS W/CONTRAST: CPT

## 2023-03-10 PROCEDURE — 96375 TX/PRO/DX INJ NEW DRUG ADDON: CPT

## 2023-03-10 PROCEDURE — 74018 RADEX ABDOMEN 1 VIEW: CPT

## 2023-03-10 PROCEDURE — 0 DIATRIZOATE MEGLUMINE & SODIUM PER 1 ML: Performed by: SURGERY

## 2023-03-10 PROCEDURE — 25010000002 PROPOFOL 10 MG/ML EMULSION: Performed by: NURSE ANESTHETIST, CERTIFIED REGISTERED

## 2023-03-10 RX ORDER — FAMOTIDINE 10 MG/ML
20 INJECTION, SOLUTION INTRAVENOUS ONCE
Status: COMPLETED | OUTPATIENT
Start: 2023-03-10 | End: 2023-03-10

## 2023-03-10 RX ORDER — DEXTROSE AND SODIUM CHLORIDE 5; .45 G/100ML; G/100ML
30 INJECTION, SOLUTION INTRAVENOUS CONTINUOUS PRN
Status: DISCONTINUED | OUTPATIENT
Start: 2023-03-10 | End: 2023-03-10 | Stop reason: HOSPADM

## 2023-03-10 RX ORDER — METHYLPREDNISOLONE SODIUM SUCCINATE 125 MG/2ML
125 INJECTION, POWDER, LYOPHILIZED, FOR SOLUTION INTRAMUSCULAR; INTRAVENOUS ONCE
Status: COMPLETED | OUTPATIENT
Start: 2023-03-10 | End: 2023-03-10

## 2023-03-10 RX ORDER — DIPHENHYDRAMINE HYDROCHLORIDE 50 MG/ML
50 INJECTION INTRAMUSCULAR; INTRAVENOUS ONCE
Status: COMPLETED | OUTPATIENT
Start: 2023-03-10 | End: 2023-03-10

## 2023-03-10 RX ORDER — PROPOFOL 10 MG/ML
VIAL (ML) INTRAVENOUS AS NEEDED
Status: DISCONTINUED | OUTPATIENT
Start: 2023-03-10 | End: 2023-03-10 | Stop reason: SURG

## 2023-03-10 RX ADMIN — PROPOFOL 20 MG: 10 INJECTION, EMULSION INTRAVENOUS at 09:20

## 2023-03-10 RX ADMIN — PROPOFOL 30 MG: 10 INJECTION, EMULSION INTRAVENOUS at 09:06

## 2023-03-10 RX ADMIN — FAMOTIDINE 20 MG: 10 INJECTION INTRAVENOUS at 14:32

## 2023-03-10 RX ADMIN — PROPOFOL 20 MG: 10 INJECTION, EMULSION INTRAVENOUS at 09:12

## 2023-03-10 RX ADMIN — PROPOFOL 20 MG: 10 INJECTION, EMULSION INTRAVENOUS at 08:49

## 2023-03-10 RX ADMIN — IOPAMIDOL 90 ML: 612 INJECTION, SOLUTION INTRAVENOUS at 14:24

## 2023-03-10 RX ADMIN — PROPOFOL 30 MG: 10 INJECTION, EMULSION INTRAVENOUS at 08:53

## 2023-03-10 RX ADMIN — PROPOFOL 30 MG: 10 INJECTION, EMULSION INTRAVENOUS at 09:09

## 2023-03-10 RX ADMIN — PROPOFOL 30 MG: 10 INJECTION, EMULSION INTRAVENOUS at 08:48

## 2023-03-10 RX ADMIN — PROPOFOL 30 MG: 10 INJECTION, EMULSION INTRAVENOUS at 09:03

## 2023-03-10 RX ADMIN — PROPOFOL 20 MG: 10 INJECTION, EMULSION INTRAVENOUS at 09:24

## 2023-03-10 RX ADMIN — DIPHENHYDRAMINE HYDROCHLORIDE 50 MG: 50 INJECTION INTRAMUSCULAR; INTRAVENOUS at 14:31

## 2023-03-10 RX ADMIN — PROPOFOL 20 MG: 10 INJECTION, EMULSION INTRAVENOUS at 09:16

## 2023-03-10 RX ADMIN — DEXTROSE AND SODIUM CHLORIDE 30 ML/HR: 5; 450 INJECTION, SOLUTION INTRAVENOUS at 08:17

## 2023-03-10 RX ADMIN — METHYLPREDNISOLONE SODIUM SUCCINATE 125 MG: 125 INJECTION, POWDER, FOR SOLUTION INTRAMUSCULAR; INTRAVENOUS at 14:32

## 2023-03-10 RX ADMIN — DIATRIZOATE MEGLUMINE AND DIATRIZOATE SODIUM 30 ML: 660; 100 LIQUID ORAL; RECTAL at 14:24

## 2023-03-10 RX ADMIN — PROPOFOL 80 MG: 10 INJECTION, EMULSION INTRAVENOUS at 08:45

## 2023-03-10 RX ADMIN — PROPOFOL 30 MG: 10 INJECTION, EMULSION INTRAVENOUS at 08:58

## 2023-03-10 RX ADMIN — PROPOFOL 30 MG: 10 INJECTION, EMULSION INTRAVENOUS at 08:51

## 2023-03-10 RX ADMIN — PROPOFOL 30 MG: 10 INJECTION, EMULSION INTRAVENOUS at 09:00

## 2023-03-10 RX ADMIN — PROPOFOL 30 MG: 10 INJECTION, EMULSION INTRAVENOUS at 08:56

## 2023-03-10 NOTE — ANESTHESIA PREPROCEDURE EVALUATION
Anesthesia Evaluation     Patient summary reviewed and Nursing notes reviewed   NPO Solid Status: > 8 hours  NPO Liquid Status: > 2 hours           Airway   Mallampati: II  TM distance: >3 FB  Neck ROM: full  No difficulty expected  Dental    (+) upper dentures and lower dentures    Pulmonary - normal exam   (+) a smoker Current,   Cardiovascular - normal exam    (+) hypertension, hyperlipidemia,       Neuro/Psych  (+) psychiatric history Anxiety,    GI/Hepatic/Renal/Endo    (+)  GERD,      Musculoskeletal     Abdominal    Substance History      OB/GYN negative ob/gyn ROS         Other                      Anesthesia Plan    ASA 2     general   total IV anesthesia  intravenous induction     Anesthetic plan, risks, benefits, and alternatives have been provided, discussed and informed consent has been obtained with: patient.        CODE STATUS:

## 2023-03-10 NOTE — ED PROVIDER NOTES
Subjective   History of Present Illness  62-year-old female comes to the ER from outpatient CT scanner after having an allergic reaction to the contrast.  Patient developed lip swelling and tingling.  Also states that it feels like her throat is getting a little bit tight.  She has noticed redness on her body, but no itching.  She denies other symptoms.    History provided by:  Patient   used: No        Review of Systems   Constitutional: Negative for chills and fever.   HENT: Negative for drooling.         Lip tingling.   Eyes: Negative for redness.   Respiratory: Negative for apnea, cough, choking, chest tightness, shortness of breath, wheezing and stridor.    Cardiovascular: Negative for chest pain.   Gastrointestinal: Negative for abdominal pain, nausea and vomiting.   Genitourinary: Negative for flank pain.   Skin: Positive for rash. Negative for color change.   Neurological: Negative for seizures.   Psychiatric/Behavioral: Negative for confusion.       Past Medical History:   Diagnosis Date   • Benign polyp of large intestine    • Essential hypertension    • Gastroesophageal reflux disease    • Hypertension    • Postmenopausal state    • Tubular adenoma of colon    • Villous adenoma of colon     1.6cm VTA with intermediate grade dysplasia transverse.  1.0cm VTA with intermediate grade dysplasia ascending   • Vitamin D deficiency        Allergies   Allergen Reactions   • Contrast Dye (Echo Or Unknown Ct/Mr) Swelling       Past Surgical History:   Procedure Laterality Date   • COLONOSCOPY  05/16/2016    Many 4-5 mm polyps in the transverse colon and in the ascending colon.Resected and retrieved.External and internal hemorrhoids   • HYSTERECTOMY  2013   • KNEE SURGERY Left    • TOTAL ABDOMINAL HYSTERECTOMY WITH SALPINGO OOPHORECTOMY         Family History   Problem Relation Age of Onset   • Stroke Mother    • Cataracts Mother    • Hypertension Mother    • Heart disease Father    • Diabetes  "Father    • Obesity Father    • Glaucoma Father    • Hypertension Father        Social History     Socioeconomic History   • Marital status: Single   Tobacco Use   • Smoking status: Every Day     Packs/day: 1.00     Years: 45.00     Pack years: 45.00     Types: Cigarettes     Start date: 1975   • Smokeless tobacco: Never   Vaping Use   • Vaping Use: Never used   Substance and Sexual Activity   • Alcohol use: No   • Drug use: No   • Sexual activity: Not Currently     Birth control/protection: Surgical           Objective    Vitals:    03/10/23 1419 03/10/23 1420 03/10/23 1513   BP: 139/69  156/88   Pulse: 92  80   Resp: 19  18   Temp: 98 °F (36.7 °C)     SpO2: 94%  94%   Weight:  56.7 kg (125 lb)    Height:  160 cm (63\")        Physical Exam  Vitals and nursing note reviewed.   Constitutional:       General: She is not in acute distress.     Appearance: She is well-developed. She is not ill-appearing, toxic-appearing or diaphoretic.   HENT:      Head: Normocephalic.      Nose: No congestion or rhinorrhea.      Mouth/Throat:      Mouth: Mucous membranes are moist.      Pharynx: No oropharyngeal exudate or posterior oropharyngeal erythema.   Eyes:      Conjunctiva/sclera: Conjunctivae normal.   Pulmonary:      Effort: Pulmonary effort is normal. No accessory muscle usage or respiratory distress.      Breath sounds: Normal breath sounds.   Chest:      Chest wall: No tenderness.   Abdominal:      Palpations: Abdomen is soft.      Tenderness: There is no abdominal tenderness (deep palpation). There is no guarding or rebound.   Skin:     General: Skin is warm and dry.      Capillary Refill: Capillary refill takes less than 2 seconds.      Findings: Rash present.   Neurological:      Mental Status: She is alert and oriented to person, place, and time.         Procedures           ED Course                    Medical Decision Making  Vital signs are stable, afebrile.  Patient received Benadryl, Solu-Medrol, Pepcid.  After " period of observation patient was reevaluated with reports an improvement in her symptoms.  No respiratory complaints.  Recommend follow-up with her PCP.  Return precautions given.  Patient states understanding and is agreeable to the plan.    Allergic reaction, initial encounter: acute illness or injury  Risk  Prescription drug management.          Final diagnoses:   Allergic reaction, initial encounter       ED Disposition  ED Disposition     ED Disposition   Discharge    Condition   Stable    Comment   --             Spring Hollins, DNP, APRN  [address] [place] [address] [place] [address] [id number] [phone]    Schedule an appointment as soon as possible for a visit in 2 days  ER follow up         Medication List      No changes were made to your prescriptions during this visit.          Ravin Hawley MD  03/10/23 8742

## 2023-03-10 NOTE — H&P
No chief complaint on file.      Jo Ann Lacy is a 62 y.o. female referred today for evaluation for colonoscopy.  She notes no change in bowel habits, no blood in the stool.     Hx of cscope 5 yrs ago by gi in [place] and had 3 t/a polyps removed.  One was partially removed in ascending colon and tattooed.  Pt was referred for surgical resection but she did not f/u afterwards.  No cscope or other intervention since then.  Saw  JERAMIE Olsen and set up for this scope.    Denies ct scan.    Prior Colonoscopy:yes  Prior Polyps:yes  Family History of Colon Cancer:no  On anticoagulation:no    Past Surgical History:   Procedure Laterality Date   • COLONOSCOPY  05/16/2016    Many 4-5 mm polyps in the transverse colon and in the ascending colon.Resected and retrieved.External and internal hemorrhoids   • HYSTERECTOMY  2013   • KNEE SURGERY Left    • TOTAL ABDOMINAL HYSTERECTOMY WITH SALPINGO OOPHORECTOMY       Past Medical History:   Diagnosis Date   • Benign polyp of large intestine    • Essential hypertension    • Gastroesophageal reflux disease    • Hypertension    • Postmenopausal state    • Tubular adenoma of colon    • Villous adenoma of colon     1.6cm VTA with intermediate grade dysplasia transverse.  1.0cm VTA with intermediate grade dysplasia ascending   • Vitamin D deficiency      Social History     Socioeconomic History   • Marital status: Single   Tobacco Use   • Smoking status: Every Day     Packs/day: 1.00     Years: 45.00     Pack years: 45.00     Types: Cigarettes     Start date: 1975   • Smokeless tobacco: Never   Vaping Use   • Vaping Use: Never used   Substance and Sexual Activity   • Alcohol use: No   • Drug use: No   • Sexual activity: Not Currently     Birth control/protection: Surgical     Family History   Problem Relation Age of Onset   • Stroke Mother    • Cataracts Mother    • Hypertension Mother    • Heart disease Father    • Diabetes Father    • Obesity Father    • Glaucoma Father    •  Hypertension Father      No Known Allergies    Home Medications:  Prior to Admission medications    Medication Sig Start Date End Date Taking? Authorizing Provider   amLODIPine (NORVASC) 5 MG tablet Take 1 tablet by mouth Daily. 2/28/23  Yes Spring Hollins DNP, APRN   omeprazole (priLOSEC) 20 MG capsule Take 1 capsule by mouth Daily. 2/28/23  Yes Spring Hollins DNP, APRN   polyethylene glycol (GoLYTELY) 236 g solution Please use instructions given in office. 2/7/23  Yes Vaibhav Olsen PA-C   rosuvastatin (CRESTOR) 20 MG tablet Take 1 tablet by mouth Every Night. 2/28/23  Yes Spring Hollins DNP, APRN   traZODone (DESYREL) 50 MG tablet Take 1 tablet by mouth Every Night. 2/28/23  Yes Spring Hollins DNP, APRN   vitamin D (ERGOCALCIFEROL) 1.25 MG (52601 UT) capsule capsule Take 1 capsule by mouth 1 (One) Time Per Week. 2/28/23  Yes Spring Hollins DNP, APRN       Review of Systems   Constitutional: Negative.    HENT: Negative for hearing loss, nosebleeds and trouble swallowing.    Respiratory: Negative for apnea, chest tightness and shortness of breath.    Cardiovascular: Negative for chest pain and palpitations.   Gastrointestinal: Negative for abdominal distention, abdominal pain, blood in stool, constipation, diarrhea, nausea and vomiting.   Genitourinary: Negative for difficulty urinating, dysuria, frequency and urgency.   Musculoskeletal: Negative for back pain, joint swelling and neck pain.   Skin: Negative for rash.   Neurological: Negative for dizziness, seizures, weakness, light-headedness, numbness and headaches.   Hematological: Negative for adenopathy.   Psychiatric/Behavioral: Negative for agitation. The patient is not nervous/anxious.        Vitals:    03/10/23 0810   BP: (!) 181/100   Pulse: 78   Resp: 18   Temp: 97.1 °F (36.2 °C)   SpO2: 95%       Physical Exam  Constitutional:       General: She is not in acute distress.     Appearance: She is well-developed.   HENT:      Head: Normocephalic and  atraumatic.   Eyes:      General: No scleral icterus.     Conjunctiva/sclera: Conjunctivae normal.   Neck:      Thyroid: No thyromegaly.      Trachea: No tracheal deviation.   Cardiovascular:      Rate and Rhythm: Normal rate and regular rhythm.      Heart sounds: Normal heart sounds. No murmur heard.    No friction rub. No gallop.   Pulmonary:      Effort: Pulmonary effort is normal. No respiratory distress.      Breath sounds: Normal breath sounds. No stridor. No wheezing or rales.   Chest:      Chest wall: No tenderness.   Abdominal:      General: Bowel sounds are normal. There is no distension.      Palpations: Abdomen is soft. There is no mass.      Tenderness: There is no abdominal tenderness. There is no guarding or rebound.      Hernia: No hernia is present.   Musculoskeletal:         General: No deformity. Normal range of motion.      Cervical back: Normal range of motion and neck supple.   Lymphadenopathy:      Cervical: No cervical adenopathy.   Skin:     General: Skin is warm and dry.      Coloration: Skin is not pale.      Findings: No erythema or rash.      Nails: There is no clubbing.   Neurological:      Mental Status: She is alert and oriented to person, place, and time.   Psychiatric:         Behavior: Behavior normal.         Thought Content: Thought content normal.         Assessment     In need of  colonoscopy.    Plan     Risks, benefits, rationale and prep for colonoscopy have been discussed with the patient.  The patient indicates understanding of these issues and agrees with the plan.

## 2023-03-10 NOTE — ANESTHESIA POSTPROCEDURE EVALUATION
Patient: Jo Ann Lacy    Procedure Summary     Date: 03/10/23 Room / Location: [facility] ENDOSCOPY 2 / [facility] ENDOSCOPY    Anesthesia Start: 0843 Anesthesia Stop: 0939    Procedure: COLONOSCOPY Diagnosis:       Polyp of ascending colon, unspecified type      History of colon polyps      (Polyp of ascending colon, unspecified type [K63.5])      (History of colon polyps [Z86.010])    Surgeons: Last Miranda MD Provider: Jace Ponce CRNA    Anesthesia Type: general ASA Status: 2          Anesthesia Type: general    Vitals  No vitals data found for the desired time range.          Post Anesthesia Care and Evaluation    Patient location during evaluation: PHASE II  Patient participation: complete - patient participated  Level of consciousness: sleepy but conscious  Pain score: 0  Pain management: adequate    Airway patency: patent  Anesthetic complications: No anesthetic complications  PONV Status: none  Cardiovascular status: acceptable  Respiratory status: acceptable  Hydration status: acceptable    Comments: Hr 66, bp 144/66, rr 16, o2 sats 97%  No anesthesia care post op

## 2023-03-13 LAB — REF LAB TEST METHOD: NORMAL

## 2023-03-14 NOTE — PROGRESS NOTES
Subjective   Jo Ann Lacy is a 62 y.o. female.     History of Present Illness  She presents today for her routine follow-up on chronic medical problems.  She reports that she has been tolerating her amlodipine without side effect.  Her blood pressure is well controlled today in the office.  She has maintained her weight since her last office visit.  Her BMI is currently 22.5%.  She is still due for colon cancer screening.  She does have an upcoming appointment scheduled regarding this.  She does have complaints of insomnia symptoms.  She reports that her mother recently passed away and she has been feeling depressed.  She would like something to help her sleep if possible.  She does need refills on her routine daily medications.  She is otherwise without any other new complaints today in the office.       The following portions of the patient's history were reviewed and updated as appropriate: allergies, current medications, past family history, past medical history, past social history, past surgical history and problem list.    Review of Systems   Constitutional: Positive for fatigue.   HENT: Negative.    Eyes: Negative.    Respiratory: Negative.    Cardiovascular: Negative.    Gastrointestinal: Negative.    Musculoskeletal: Positive for arthralgias, back pain and myalgias.   Skin: Negative.    Allergic/Immunologic: Negative.    Neurological: Negative.    Hematological: Negative.    Psychiatric/Behavioral: Positive for sleep disturbance and stress. The patient is nervous/anxious.        Objective   Physical Exam  Vitals reviewed.   Constitutional:       General: She is not in acute distress.     Appearance: She is well-developed. She is not diaphoretic.   HENT:      Head: Normocephalic.      Right Ear: External ear normal.      Left Ear: External ear normal.      Nose: Nose normal.   Eyes:      Pupils: Pupils are equal, round, and reactive to light.   Neck:      Thyroid: No thyromegaly.      Vascular: No JVD.    Cardiovascular:      Rate and Rhythm: Normal rate and regular rhythm.      Heart sounds: No murmur heard.    No friction rub. No gallop.   Pulmonary:      Effort: Pulmonary effort is normal. No respiratory distress.      Breath sounds: Normal breath sounds. No wheezing or rales.   Musculoskeletal:      Cervical back: Normal range of motion and neck supple.      Lumbar back: Spasms and tenderness present. Decreased range of motion.   Skin:     General: Skin is warm and dry.      Coloration: Skin is not pale.      Findings: No erythema or rash.   Neurological:      Mental Status: She is alert and oriented to person, place, and time.   Psychiatric:         Behavior: Behavior normal.         Thought Content: Thought content normal.         Judgment: Judgment normal.           Assessment & Plan   Diagnoses and all orders for this visit:    1. Dyslipidemia (Primary)  -     rosuvastatin (CRESTOR) 20 MG tablet; Take 1 tablet by mouth Every Night.  Dispense: 90 tablet; Refill: 1    2. Stress  -     traZODone (DESYREL) 50 MG tablet; Take 1 tablet by mouth Every Night.  Dispense: 90 tablet; Refill: 1    3. Other insomnia  -     traZODone (DESYREL) 50 MG tablet; Take 1 tablet by mouth Every Night.  Dispense: 90 tablet; Refill: 1    4. Vitamin D deficiency  -     vitamin D (ERGOCALCIFEROL) 1.25 MG (36691 UT) capsule capsule; Take 1 capsule by mouth 1 (One) Time Per Week.  Dispense: 12 capsule; Refill: 3    5. Essential hypertension  -     amLODIPine (NORVASC) 5 MG tablet; Take 1 tablet by mouth Daily.  Dispense: 90 tablet; Refill: 1    6. Heartburn  -     omeprazole (priLOSEC) 20 MG capsule; Take 1 capsule by mouth Daily.  Dispense: 30 capsule; Refill: 1    Other orders  -     Cancel: XR sacrum and coccyx               BMI is within normal parameters. No other follow-up for BMI required.    X-ray following office visit.  Prescription for trazodone 50 mg nightly for insomnia symptom management.  Continue current  medications.  Follow up as scheduled for routine follow up.  Follow up sooner for problems/concerns.  Patient verbalized understanding and agreement with plan of care.        This document has been electronically signed by Spring Hollins DNP, RODNEY on March 14, 2023 10:44 CDT

## 2023-03-20 ENCOUNTER — OFFICE VISIT (OUTPATIENT)
Dept: SURGERY | Facility: CLINIC | Age: 63
End: 2023-03-20
Payer: MEDICAID

## 2023-03-20 VITALS
TEMPERATURE: 97 F | WEIGHT: 130 LBS | HEIGHT: 63 IN | DIASTOLIC BLOOD PRESSURE: 80 MMHG | SYSTOLIC BLOOD PRESSURE: 124 MMHG | HEART RATE: 82 BPM | BODY MASS INDEX: 23.04 KG/M2

## 2023-03-20 DIAGNOSIS — D12.6 TUBULAR ADENOMA OF COLON: Primary | Chronic | ICD-10-CM

## 2023-03-20 PROCEDURE — 99213 OFFICE O/P EST LOW 20 MIN: CPT | Performed by: SURGERY

## 2023-03-20 PROCEDURE — 1159F MED LIST DOCD IN RCRD: CPT | Performed by: SURGERY

## 2023-03-20 PROCEDURE — 1160F RVW MEDS BY RX/DR IN RCRD: CPT | Performed by: SURGERY

## 2023-03-20 PROCEDURE — 3074F SYST BP LT 130 MM HG: CPT | Performed by: SURGERY

## 2023-03-20 PROCEDURE — 3079F DIAST BP 80-89 MM HG: CPT | Performed by: SURGERY

## 2023-03-20 NOTE — PROGRESS NOTES
62-year-old female here to follow-up after recent colonoscopy.  Patient was noted to have a large adenomatous polyp in the ascending colon over 5 years ago in [place].  She did not follow-up with surgery as she was recommended to have surgery at that time for this large polyp.  Patient saw our GI people and we set her up for colonoscopy which I did last week.  The area had been tattooed 5 years ago.  Polyp was easily identified and multiple biopsies were taken.  The biopsies demonstrated this to be a tubular adenomatous polyp without high-grade dysplasia.  I did not think that I can get this polyp out endoscopically.  She had 2 smaller tubular adenomas polyps removed from the transverse colon as well.  Of note she had what appeared to be a separate polyp opposite the larger polyp in the ascending colon which was not biopsied.  We also obtained an x-ray with the scope in place which confirmed that this area was clearly in the ascending colon where this polyp was located.  It also demonstrated the rather large transverse colon extending down into the pelvis.  Patient also had a CT scan after her colonoscopy and it was unremarkable.  Of note patient did have an allergic reaction and presented to emergency room after the CT scan for contrast allergy which she was treated for with resolution of her symptoms.  I went over these findings with her.  Would recommend that she undergo a laparoscopic right colon resection as I do not think this larger sessile polyp can be removed endoscopically.  Patient has some social issues right now she does not wish to do the surgery at this point but she does agree to return in 3 to 4 weeks and be set up for the surgery at that time.  I do not think this is unreasonable.  The polyp does not look like it has cancer present clinically and the biopsies multiple of which were done confirm that.  I do think it needs to be removed and there is a small chance it could be cancer present and  I went over that with the patient.  Waiting a month or so I do not think is going to affect anything at this point.  Patient understands she will follow-up sooner if she changes her mind about having surgery sooner

## 2023-03-22 ENCOUNTER — TELEPHONE (OUTPATIENT)
Dept: FAMILY MEDICINE CLINIC | Facility: CLINIC | Age: 63
End: 2023-03-22
Payer: MEDICAID

## 2023-03-22 NOTE — TELEPHONE ENCOUNTER
It looks like she had the CT scan on the 10th and was sent to the ER for an allergic reaction at that time.  Are these new symptoms or have them been present since that time?

## 2023-03-22 NOTE — TELEPHONE ENCOUNTER
Called in stating that she thinks she is still having an allergic reaction to the contrast they used to do her CT scan. She has a rash on both arms. Please advise

## 2023-03-23 NOTE — TELEPHONE ENCOUNTER
Rash on both arms visible only under the light. When she scratches them they become raised. She has been Hydrocortisone cream on there that helps with the itching. They have been there since she had the procedure done.[place] she kira to make a follow up appointment with you? Pt stated that she experienced her throat closing,lips burning after the initial dose.

## 2023-03-24 NOTE — TELEPHONE ENCOUNTER
She should make a follow up appointment if she is still having problems after the initial reaction.  Also recommend ER for any worsening rash, itching, swelling, shortness of breath, etc.

## 2023-05-15 ENCOUNTER — OFFICE VISIT (OUTPATIENT)
Dept: SURGERY | Facility: CLINIC | Age: 63
End: 2023-05-15
Payer: MEDICAID

## 2023-05-15 VITALS
SYSTOLIC BLOOD PRESSURE: 122 MMHG | DIASTOLIC BLOOD PRESSURE: 76 MMHG | WEIGHT: 131 LBS | BODY MASS INDEX: 23.21 KG/M2 | HEIGHT: 63 IN | TEMPERATURE: 96.8 F | HEART RATE: 82 BPM

## 2023-05-15 DIAGNOSIS — D12.6 TUBULAR ADENOMA OF COLON: Primary | Chronic | ICD-10-CM

## 2023-05-15 PROCEDURE — 3078F DIAST BP <80 MM HG: CPT | Performed by: SURGERY

## 2023-05-15 PROCEDURE — 3074F SYST BP LT 130 MM HG: CPT | Performed by: SURGERY

## 2023-05-15 PROCEDURE — 1160F RVW MEDS BY RX/DR IN RCRD: CPT | Performed by: SURGERY

## 2023-05-15 PROCEDURE — 1159F MED LIST DOCD IN RCRD: CPT | Performed by: SURGERY

## 2023-05-15 PROCEDURE — 99214 OFFICE O/P EST MOD 30 MIN: CPT | Performed by: SURGERY

## 2023-05-15 RX ORDER — GABAPENTIN 100 MG/1
600 CAPSULE ORAL ONCE
OUTPATIENT
Start: 2023-05-15 | End: 2023-05-15

## 2023-05-15 RX ORDER — CETIRIZINE HYDROCHLORIDE 10 MG/1
1 TABLET ORAL DAILY
COMMUNITY
Start: 2023-04-29 | End: 2023-05-17

## 2023-05-15 RX ORDER — MELOXICAM 15 MG/1
15 TABLET ORAL ONCE
OUTPATIENT
Start: 2023-05-23 | End: 2023-05-15

## 2023-05-15 RX ORDER — CHLORHEXIDINE GLUCONATE 4 G/100ML
SOLUTION TOPICAL 2 TIMES DAILY
Qty: 236 ML | Refills: 0 | Status: SHIPPED | OUTPATIENT
Start: 2023-05-15 | End: 2023-05-17

## 2023-05-15 RX ORDER — ALVIMOPAN 12 MG/1
12 CAPSULE ORAL ONCE
OUTPATIENT
Start: 2023-05-15 | End: 2023-05-22

## 2023-05-15 RX ORDER — ACETAMINOPHEN 500 MG
1000 TABLET ORAL EVERY 6 HOURS
OUTPATIENT
Start: 2023-05-15

## 2023-05-15 RX ORDER — SCOLOPAMINE TRANSDERMAL SYSTEM 1 MG/1
1 PATCH, EXTENDED RELEASE TRANSDERMAL CONTINUOUS
OUTPATIENT
Start: 2023-05-15 | End: 2023-05-18

## 2023-05-15 NOTE — PROGRESS NOTES
62-year-old female found to have a likely 4 cm sessile tubular adenomatous polyp involving her right colon.  Unable to be removed endoscopically.  Patient had this biopsied 5 years ago in [place] and then recently had this rebiopsied.  Biopsy did not demonstrate any obvious cancer but confirms this is a tubular adenomatous polyp.  Patient aware the small chance of may be cancer present in nonbiopsied portions of the polyp.  CT scan was unremarkable for any metastatic disease.  X-ray with scope in place demonstrates this to be right upper quadrant and the distal portion of the right colon.  It is has been tattooed previously    Vitals:    05/15/23 1257   BP: 122/76   Pulse: 82   Temp: 96.8 °F (36 °C)       Allergies:   Allergies   Allergen Reactions   • Contrast Dye (Echo Or Unknown Ct/Mr) Swelling       Home Medications:  Prior to Admission medications    Medication Sig Start Date End Date Taking? Authorizing Provider   amLODIPine (NORVASC) 5 MG tablet Take 1 tablet by mouth Daily. 2/28/23  Yes Spring Hollins DNP, APRN   cetirizine (zyrTEC) 10 MG tablet Take 1 tablet by mouth Daily.  Patient not taking: Reported on 5/15/2023 4/29/23   Provider, MD Aquilino   chlorhexidine (HIBICLENS) 4 % external liquid Apply  topically to the appropriate area as directed 2 (Two) Times a Day. Shower With Hibiclens Solution Twice The Day Before Surgery 5/15/23   Last Miranda MD   omeprazole (priLOSEC) 20 MG capsule Take 1 capsule by mouth Daily.  Patient not taking: Reported on 5/15/2023 2/28/23   Spring Hollins DNP, APRN   rosuvastatin (CRESTOR) 20 MG tablet Take 1 tablet by mouth Every Night.  Patient not taking: Reported on 5/15/2023 2/28/23   Spring Hollins DNP, APRN   traZODone (DESYREL) 50 MG tablet Take 1 tablet by mouth Every Night.  Patient not taking: Reported on 5/15/2023 2/28/23   Spring Hollins DNP, APRN   vitamin D (ERGOCALCIFEROL) 1.25 MG (84932 UT) capsule capsule Take 1 capsule by mouth 1 (One) Time Per  Week.  Patient not taking: Reported on 5/15/2023 2/28/23   Spring Hollins, DNP, APRN   polyethylene glycol (GoLYTELY) 236 g solution Please use instructions given in office. 2/7/23 5/15/23  Vaibhav Olsen, PAHelenC       Social History     Socioeconomic History   • Marital status: Single   Tobacco Use   • Smoking status: Every Day     Packs/day: 1.00     Years: 45.00     Pack years: 45.00     Types: Cigarettes     Start date: 1975   • Smokeless tobacco: Never   Vaping Use   • Vaping Use: Never used   Substance and Sexual Activity   • Alcohol use: No   • Drug use: No   • Sexual activity: Not Currently     Birth control/protection: Surgical       Past Medical History:   Diagnosis Date   • Benign polyp of large intestine    • Essential hypertension    • Gastroesophageal reflux disease    • Hypertension    • Postmenopausal state    • Tubular adenoma of colon    • Villous adenoma of colon     1.6cm VTA with intermediate grade dysplasia transverse.  1.0cm VTA with intermediate grade dysplasia ascending   • Vitamin D deficiency        Family History   Problem Relation Age of Onset   • Stroke Mother    • Cataracts Mother    • Hypertension Mother    • Heart disease Father    • Diabetes Father    • Obesity Father    • Glaucoma Father    • Hypertension Father        Past Surgical History:   Procedure Laterality Date   • COLONOSCOPY  05/16/2016    Many 4-5 mm polyps in the transverse colon and in the ascending colon.Resected and retrieved.External and internal hemorrhoids   • COLONOSCOPY N/A 3/10/2023    Procedure: COLONOSCOPY;  Surgeon: Last Miranda MD;  Location: [facility] ENDOSCOPY;  Service: Gastroenterology;  Laterality: N/A;   • HYSTERECTOMY  2013   • KNEE SURGERY Left    • TOTAL ABDOMINAL HYSTERECTOMY WITH SALPINGO OOPHORECTOMY       Review of systems  Denies chest pain  Denies shortness of breath  Denies palpitations  Denies abdominal pain  Denies nausea and vomiting  Denies any urinary complaints  Denies fever  chills  Does not take any anticoagulants  Denies any bleeding issues   Denies history of DVT  No history of asthma  No history of seizures    Physical Exam  Constitutional:       General: She is not in acute distress.     Appearance: She is well-developed. She is not ill-appearing or toxic-appearing.   HENT:      Head: Normocephalic and atraumatic.      Nose: Nose normal.   Eyes:      General: No scleral icterus.     Conjunctiva/sclera: Conjunctivae normal.   Neck:      Thyroid: No thyromegaly.      Trachea: No tracheal deviation.   Cardiovascular:      Rate and Rhythm: Normal rate and regular rhythm.      Heart sounds: Normal heart sounds. No murmur heard.    No friction rub. No gallop.   Pulmonary:      Effort: Pulmonary effort is normal. No respiratory distress.      Breath sounds: Normal breath sounds. No stridor. No wheezing or rales.   Chest:      Chest wall: No tenderness.   Abdominal:      General: Bowel sounds are normal. There is no distension.      Palpations: Abdomen is soft. There is no mass.      Tenderness: There is no abdominal tenderness. There is no guarding or rebound.      Hernia: No hernia is present.   Musculoskeletal:         General: No deformity. Normal range of motion.      Cervical back: Normal range of motion and neck supple.   Lymphadenopathy:      Cervical: No cervical adenopathy.   Skin:     General: Skin is warm and dry.      Coloration: Skin is not pale.      Findings: No erythema or rash.      Nails: There is no clubbing.   Neurological:      Mental Status: She is alert and oriented to person, place, and time.   Psychiatric:         Behavior: Behavior normal.         Thought Content: Thought content normal.     Assessment and plan  Tubular adenomatous polyp recommend laparoscopic right colectomy to address.  Fully discussed the procedure alternatives risk and benefits with the patient she clearly understands.  She will be placed on a RASS protocol.

## 2023-05-15 NOTE — H&P (VIEW-ONLY)
62-year-old female found to have a likely 4 cm sessile tubular adenomatous polyp involving her right colon.  Unable to be removed endoscopically.  Patient had this biopsied 5 years ago in [place] and then recently had this rebiopsied.  Biopsy did not demonstrate any obvious cancer but confirms this is a tubular adenomatous polyp.  Patient aware the small chance of may be cancer present in nonbiopsied portions of the polyp.  CT scan was unremarkable for any metastatic disease.  X-ray with scope in place demonstrates this to be right upper quadrant and the distal portion of the right colon.  It is has been tattooed previously    Vitals:    05/15/23 1257   BP: 122/76   Pulse: 82   Temp: 96.8 °F (36 °C)       Allergies:   Allergies   Allergen Reactions   • Contrast Dye (Echo Or Unknown Ct/Mr) Swelling       Home Medications:  Prior to Admission medications    Medication Sig Start Date End Date Taking? Authorizing Provider   amLODIPine (NORVASC) 5 MG tablet Take 1 tablet by mouth Daily. 2/28/23  Yes Spring Hollins DNP, APRN   cetirizine (zyrTEC) 10 MG tablet Take 1 tablet by mouth Daily.  Patient not taking: Reported on 5/15/2023 4/29/23   Provider, MD Aquilino   chlorhexidine (HIBICLENS) 4 % external liquid Apply  topically to the appropriate area as directed 2 (Two) Times a Day. Shower With Hibiclens Solution Twice The Day Before Surgery 5/15/23   Last Miranda MD   omeprazole (priLOSEC) 20 MG capsule Take 1 capsule by mouth Daily.  Patient not taking: Reported on 5/15/2023 2/28/23   Spring Hollins DNP, APRN   rosuvastatin (CRESTOR) 20 MG tablet Take 1 tablet by mouth Every Night.  Patient not taking: Reported on 5/15/2023 2/28/23   Spring Hollins DNP, APRN   traZODone (DESYREL) 50 MG tablet Take 1 tablet by mouth Every Night.  Patient not taking: Reported on 5/15/2023 2/28/23   Spring Hollins DNP, APRN   vitamin D (ERGOCALCIFEROL) 1.25 MG (11664 UT) capsule capsule Take 1 capsule by mouth 1 (One) Time Per  Week.  Patient not taking: Reported on 5/15/2023 2/28/23   Spring Hollins, DNP, APRN   polyethylene glycol (GoLYTELY) 236 g solution Please use instructions given in office. 2/7/23 5/15/23  Vaibhav Olsen, PAHelenC       Social History     Socioeconomic History   • Marital status: Single   Tobacco Use   • Smoking status: Every Day     Packs/day: 1.00     Years: 45.00     Pack years: 45.00     Types: Cigarettes     Start date: 1975   • Smokeless tobacco: Never   Vaping Use   • Vaping Use: Never used   Substance and Sexual Activity   • Alcohol use: No   • Drug use: No   • Sexual activity: Not Currently     Birth control/protection: Surgical       Past Medical History:   Diagnosis Date   • Benign polyp of large intestine    • Essential hypertension    • Gastroesophageal reflux disease    • Hypertension    • Postmenopausal state    • Tubular adenoma of colon    • Villous adenoma of colon     1.6cm VTA with intermediate grade dysplasia transverse.  1.0cm VTA with intermediate grade dysplasia ascending   • Vitamin D deficiency        Family History   Problem Relation Age of Onset   • Stroke Mother    • Cataracts Mother    • Hypertension Mother    • Heart disease Father    • Diabetes Father    • Obesity Father    • Glaucoma Father    • Hypertension Father        Past Surgical History:   Procedure Laterality Date   • COLONOSCOPY  05/16/2016    Many 4-5 mm polyps in the transverse colon and in the ascending colon.Resected and retrieved.External and internal hemorrhoids   • COLONOSCOPY N/A 3/10/2023    Procedure: COLONOSCOPY;  Surgeon: Last Miranda MD;  Location: [facility] ENDOSCOPY;  Service: Gastroenterology;  Laterality: N/A;   • HYSTERECTOMY  2013   • KNEE SURGERY Left    • TOTAL ABDOMINAL HYSTERECTOMY WITH SALPINGO OOPHORECTOMY       Review of systems  Denies chest pain  Denies shortness of breath  Denies palpitations  Denies abdominal pain  Denies nausea and vomiting  Denies any urinary complaints  Denies fever  chills  Does not take any anticoagulants  Denies any bleeding issues   Denies history of DVT  No history of asthma  No history of seizures    Physical Exam  Constitutional:       General: She is not in acute distress.     Appearance: She is well-developed. She is not ill-appearing or toxic-appearing.   HENT:      Head: Normocephalic and atraumatic.      Nose: Nose normal.   Eyes:      General: No scleral icterus.     Conjunctiva/sclera: Conjunctivae normal.   Neck:      Thyroid: No thyromegaly.      Trachea: No tracheal deviation.   Cardiovascular:      Rate and Rhythm: Normal rate and regular rhythm.      Heart sounds: Normal heart sounds. No murmur heard.    No friction rub. No gallop.   Pulmonary:      Effort: Pulmonary effort is normal. No respiratory distress.      Breath sounds: Normal breath sounds. No stridor. No wheezing or rales.   Chest:      Chest wall: No tenderness.   Abdominal:      General: Bowel sounds are normal. There is no distension.      Palpations: Abdomen is soft. There is no mass.      Tenderness: There is no abdominal tenderness. There is no guarding or rebound.      Hernia: No hernia is present.   Musculoskeletal:         General: No deformity. Normal range of motion.      Cervical back: Normal range of motion and neck supple.   Lymphadenopathy:      Cervical: No cervical adenopathy.   Skin:     General: Skin is warm and dry.      Coloration: Skin is not pale.      Findings: No erythema or rash.      Nails: There is no clubbing.   Neurological:      Mental Status: She is alert and oriented to person, place, and time.   Psychiatric:         Behavior: Behavior normal.         Thought Content: Thought content normal.     Assessment and plan  Tubular adenomatous polyp recommend laparoscopic right colectomy to address.  Fully discussed the procedure alternatives risk and benefits with the patient she clearly understands.  She will be placed on a RASS protocol.

## 2023-05-17 ENCOUNTER — HOSPITAL ENCOUNTER (OUTPATIENT)
Dept: GENERAL RADIOLOGY | Facility: HOSPITAL | Age: 63
Discharge: HOME | End: 2023-05-17
Payer: MEDICAID

## 2023-05-17 ENCOUNTER — PRE-ADMISSION TESTING (OUTPATIENT)
Dept: PREADMISSION TESTING | Facility: HOSPITAL | Age: 63
End: 2023-05-17
Payer: MEDICAID

## 2023-05-17 ENCOUNTER — ANESTHESIA EVENT (OUTPATIENT)
Dept: PERIOP | Facility: HOSPITAL | Age: 63
DRG: 331 | End: 2023-05-17
Payer: MEDICAID

## 2023-05-17 VITALS
HEART RATE: 80 BPM | WEIGHT: 134 LBS | RESPIRATION RATE: 16 BRPM | SYSTOLIC BLOOD PRESSURE: 136 MMHG | BODY MASS INDEX: 23.74 KG/M2 | OXYGEN SATURATION: 97 % | HEIGHT: 63 IN | DIASTOLIC BLOOD PRESSURE: 84 MMHG

## 2023-05-17 DIAGNOSIS — D12.6 TUBULAR ADENOMA OF COLON: ICD-10-CM

## 2023-05-17 DIAGNOSIS — D12.6 TUBULAR ADENOMA OF COLON: Primary | ICD-10-CM

## 2023-05-17 LAB
ABO GROUP BLD: NORMAL
ALBUMIN SERPL-MCNC: 4.2 G/DL (ref 3.5–5.2)
ALBUMIN/GLOB SERPL: 1.5 G/DL
ALP SERPL-CCNC: 70 U/L (ref 39–117)
ALT SERPL W P-5'-P-CCNC: 16 U/L (ref 1–33)
ANION GAP SERPL CALCULATED.3IONS-SCNC: 9 MMOL/L (ref 5–15)
AST SERPL-CCNC: 15 U/L (ref 1–32)
BILIRUB SERPL-MCNC: 0.2 MG/DL (ref 0–1.2)
BLD GP AB SCN SERPL QL: NEGATIVE
BUN SERPL-MCNC: 20 MG/DL (ref 8–23)
BUN/CREAT SERPL: 28.6 (ref 7–25)
CALCIUM SPEC-SCNC: 9.4 MG/DL (ref 8.6–10.5)
CHLORIDE SERPL-SCNC: 103 MMOL/L (ref 98–107)
CO2 SERPL-SCNC: 24 MMOL/L (ref 22–29)
CREAT SERPL-MCNC: 0.7 MG/DL (ref 0.57–1)
DEPRECATED RDW RBC AUTO: 45.9 FL (ref 37–54)
EGFRCR SERPLBLD CKD-EPI 2021: 96.7 ML/MIN/1.73
ERYTHROCYTE [DISTWIDTH] IN BLOOD BY AUTOMATED COUNT: 13.1 % (ref 12.3–15.4)
GLOBULIN UR ELPH-MCNC: 2.8 GM/DL
GLUCOSE SERPL-MCNC: 88 MG/DL (ref 65–99)
HCT VFR BLD AUTO: 36.6 % (ref 34–46.6)
HGB BLD-MCNC: 12.3 G/DL (ref 12–15.9)
Lab: NORMAL
MCH RBC QN AUTO: 32 PG (ref 26.6–33)
MCHC RBC AUTO-ENTMCNC: 33.6 G/DL (ref 31.5–35.7)
MCV RBC AUTO: 95.3 FL (ref 79–97)
PLATELET # BLD AUTO: 259 10*3/MM3 (ref 140–450)
PMV BLD AUTO: 10.1 FL (ref 6–12)
POTASSIUM SERPL-SCNC: 3.9 MMOL/L (ref 3.5–5.2)
PROT SERPL-MCNC: 7 G/DL (ref 6–8.5)
QT INTERVAL: 418 MS
QTC INTERVAL: 466 MS
RBC # BLD AUTO: 3.84 10*6/MM3 (ref 3.77–5.28)
RH BLD: POSITIVE
SODIUM SERPL-SCNC: 136 MMOL/L (ref 136–145)
T&S EXPIRATION DATE: NORMAL
WBC NRBC COR # BLD: 5.14 10*3/MM3 (ref 3.4–10.8)

## 2023-05-17 PROCEDURE — 86901 BLOOD TYPING SEROLOGIC RH(D): CPT

## 2023-05-17 PROCEDURE — 36415 COLL VENOUS BLD VENIPUNCTURE: CPT

## 2023-05-17 PROCEDURE — 85027 COMPLETE CBC AUTOMATED: CPT

## 2023-05-17 PROCEDURE — 86850 RBC ANTIBODY SCREEN: CPT

## 2023-05-17 PROCEDURE — 80053 COMPREHEN METABOLIC PANEL: CPT

## 2023-05-17 PROCEDURE — 86900 BLOOD TYPING SEROLOGIC ABO: CPT

## 2023-05-17 PROCEDURE — 93005 ELECTROCARDIOGRAM TRACING: CPT

## 2023-05-17 PROCEDURE — 71046 X-RAY EXAM CHEST 2 VIEWS: CPT

## 2023-05-17 RX ORDER — OMEPRAZOLE 20 MG/1
20 CAPSULE, DELAYED RELEASE ORAL DAILY PRN
COMMUNITY

## 2023-05-17 RX ORDER — NEOMYCIN SULFATE 500 MG/1
500 TABLET ORAL SEE ADMIN INSTRUCTIONS
Qty: 4 TABLET | Refills: 0 | Status: SHIPPED | OUTPATIENT
Start: 2023-05-17

## 2023-05-17 RX ORDER — METRONIDAZOLE 500 MG/1
500 TABLET ORAL SEE ADMIN INSTRUCTIONS
Qty: 4 TABLET | Refills: 0 | Status: SHIPPED | OUTPATIENT
Start: 2023-05-17 | End: 2023-05-18

## 2023-05-17 RX ORDER — SODIUM CHLORIDE, SODIUM GLUCONATE, SODIUM ACETATE, POTASSIUM CHLORIDE AND MAGNESIUM CHLORIDE 526; 502; 368; 37; 30 MG/100ML; MG/100ML; MG/100ML; MG/100ML; MG/100ML
1000 INJECTION, SOLUTION INTRAVENOUS CONTINUOUS PRN
OUTPATIENT
Start: 2023-05-23

## 2023-05-17 NOTE — ANESTHESIA PREPROCEDURE EVALUATION
Anesthesia Evaluation     Patient summary reviewed and Nursing notes reviewed   no history of anesthetic complications:  NPO Solid Status: > 8 hours  NPO Liquid Status: > 2 hours           Airway   Mallampati: II  TM distance: >3 FB  Neck ROM: full  possible difficult intubation  Dental    (+) upper dentures and lower dentures    Pulmonary     breath sounds clear to auscultation  (+) a smoker Current Smoked day of surgery, COPD moderate, decreased breath sounds,   (-) shortness of breath, rhonchi, wheezes, rales    PE comment: Albuterol treatment ordered pre-op.  Cardiovascular   Exercise tolerance: poor (<4 METS)    ECG reviewed  Rhythm: regular  Rate: normal    (+) hypertension less than 2 medications, hyperlipidemia,   (-) past MI, angina, MARIE, murmur, carotid bruits, cardiac stents    ROS comment: EK23  Normal sinus rhythm  Biatrial enlargement  Abnormal ECG  No previous ECGs available    Neuro/Psych  (+) psychiatric history Anxiety,    GI/Hepatic/Renal/Endo    (+)  GERD well controlled,      Musculoskeletal     Abdominal   (-) obese   Substance History - negative use     OB/GYN negative ob/gyn ROS   (-)  Pregnant    Comment: Previous hysterectomy         Other   arthritis,      History of cancer: History of villous adenoma of colon.                Anesthesia Plan    ASA 3     general     (Discussed central line,arterial line if necessary, additional peripheral IV and patient understands possible complications,risks and agrees.  HGB: 12.3)  intravenous induction     Anesthetic plan, risks, benefits, and alternatives have been provided, discussed and informed consent has been obtained with: patient.  Pre-procedure education provided  Use of blood products discussed with patient  Consented to blood products.       CODE STATUS:

## 2023-05-17 NOTE — PAT
Chlorhexidine scrub given with instruction sheet. Instructions reviewed in PAT, understanding verbalized.    ERAS instructions given.    Dr. Wilson here to review EKG and speak with patient. No orders received, ok to proceed.    Patient transported to chest xray after preop completed.

## 2023-05-18 ENCOUNTER — TELEPHONE (OUTPATIENT)
Dept: SURGERY | Facility: CLINIC | Age: 63
End: 2023-05-18
Payer: MEDICAID

## 2023-05-18 NOTE — TELEPHONE ENCOUNTER
PT IS HAVING COLON SURGERY NEXT TUES.      SHE IS ASKING IF THERE IS A SUGGESTED DIET AFTER THIS SURGERY.      SHE NEEDS TO BE PREPARED BEFORE SURGERY SINCE SHE LIVES ALONE.      PH# [phone]

## 2023-05-23 ENCOUNTER — ANESTHESIA (OUTPATIENT)
Dept: PERIOP | Facility: HOSPITAL | Age: 63
DRG: 331 | End: 2023-05-23
Payer: MEDICAID

## 2023-05-26 LAB
QT INTERVAL: 418 MS
QTC INTERVAL: 466 MS

## 2023-06-01 ENCOUNTER — HOSPITAL ENCOUNTER (INPATIENT)
Facility: HOSPITAL | Age: 63
LOS: 3 days | Discharge: HOME | DRG: 331 | End: 2023-06-04
Attending: SURGERY | Admitting: SURGERY
Payer: MEDICAID

## 2023-06-01 DIAGNOSIS — Z74.09 IMPAIRED FUNCTIONAL MOBILITY, BALANCE, GAIT, AND ENDURANCE: ICD-10-CM

## 2023-06-01 DIAGNOSIS — Z78.9 IMPAIRED MOBILITY AND ADLS: ICD-10-CM

## 2023-06-01 DIAGNOSIS — Z74.09 IMPAIRED MOBILITY AND ADLS: ICD-10-CM

## 2023-06-01 DIAGNOSIS — D12.6 TUBULAR ADENOMA OF COLON: ICD-10-CM

## 2023-06-01 LAB
ABO GROUP BLD: NORMAL
BLD GP AB SCN SERPL QL: NEGATIVE
Lab: NORMAL
RH BLD: POSITIVE
T&S EXPIRATION DATE: NORMAL

## 2023-06-01 PROCEDURE — 0DBB4ZZ EXCISION OF ILEUM, PERCUTANEOUS ENDOSCOPIC APPROACH: ICD-10-PCS | Performed by: SURGERY

## 2023-06-01 PROCEDURE — 25010000002 ONDANSETRON PER 1 MG: Performed by: NURSE ANESTHETIST, CERTIFIED REGISTERED

## 2023-06-01 PROCEDURE — 0DTF4ZZ RESECTION OF RIGHT LARGE INTESTINE, PERCUTANEOUS ENDOSCOPIC APPROACH: ICD-10-PCS | Performed by: SURGERY

## 2023-06-01 PROCEDURE — 25010000002 DEXAMETHASONE PER 1 MG: Performed by: NURSE ANESTHETIST, CERTIFIED REGISTERED

## 2023-06-01 PROCEDURE — 86850 RBC ANTIBODY SCREEN: CPT | Performed by: ANESTHESIOLOGY

## 2023-06-01 PROCEDURE — 97162 PT EVAL MOD COMPLEX 30 MIN: CPT

## 2023-06-01 PROCEDURE — 25010000002 PHENYLEPHRINE 10 MG/ML SOLUTION: Performed by: NURSE ANESTHETIST, CERTIFIED REGISTERED

## 2023-06-01 PROCEDURE — 86901 BLOOD TYPING SEROLOGIC RH(D): CPT | Performed by: ANESTHESIOLOGY

## 2023-06-01 PROCEDURE — 97166 OT EVAL MOD COMPLEX 45 MIN: CPT

## 2023-06-01 PROCEDURE — C9290 INJ, BUPIVACAINE LIPOSOME: HCPCS | Performed by: SURGERY

## 2023-06-01 PROCEDURE — 25010000002 MIDAZOLAM PER 1 MG: Performed by: NURSE ANESTHETIST, CERTIFIED REGISTERED

## 2023-06-01 PROCEDURE — 44204 LAPARO PARTIAL COLECTOMY: CPT | Performed by: SURGERY

## 2023-06-01 PROCEDURE — 88309 TISSUE EXAM BY PATHOLOGIST: CPT

## 2023-06-01 PROCEDURE — 94664 DEMO&/EVAL PT USE INHALER: CPT

## 2023-06-01 PROCEDURE — 25010000002 FENTANYL CITRATE (PF) 100 MCG/2ML SOLUTION: Performed by: NURSE ANESTHETIST, CERTIFIED REGISTERED

## 2023-06-01 PROCEDURE — 94640 AIRWAY INHALATION TREATMENT: CPT

## 2023-06-01 PROCEDURE — 0 BUPIVACAINE LIPOSOME 1.3 % SUSPENSION: Performed by: SURGERY

## 2023-06-01 PROCEDURE — 86900 BLOOD TYPING SEROLOGIC ABO: CPT | Performed by: ANESTHESIOLOGY

## 2023-06-01 PROCEDURE — 25010000002 PROPOFOL 200 MG/20ML EMULSION: Performed by: NURSE ANESTHETIST, CERTIFIED REGISTERED

## 2023-06-01 PROCEDURE — 25010000002 NEOSTIGMINE 10 MG/10ML SOLUTION: Performed by: NURSE ANESTHETIST, CERTIFIED REGISTERED

## 2023-06-01 DEVICE — PROXIMATE RELOADABLE LINEAR STAPLER
Type: IMPLANTABLE DEVICE | Site: ASCENDING COLON | Status: FUNCTIONAL
Brand: PROXIMATE

## 2023-06-01 DEVICE — PROXIMATE RELOADABLE LINEAR CUTTER WITH SAFETY LOCK-OUT, 75MM
Type: IMPLANTABLE DEVICE | Site: ASCENDING COLON | Status: FUNCTIONAL
Brand: PROXIMATE

## 2023-06-01 DEVICE — PROXIMATE LINEAR CUTTER RELOAD, BLUE, 75MM
Type: IMPLANTABLE DEVICE | Site: ASCENDING COLON | Status: FUNCTIONAL
Brand: PROXIMATE

## 2023-06-01 RX ORDER — ALVIMOPAN 12 MG/1
12 CAPSULE ORAL ONCE
Status: COMPLETED | OUTPATIENT
Start: 2023-06-01 | End: 2023-06-01

## 2023-06-01 RX ORDER — ONDANSETRON 2 MG/ML
4 INJECTION INTRAMUSCULAR; INTRAVENOUS ONCE AS NEEDED
Status: DISCONTINUED | OUTPATIENT
Start: 2023-06-01 | End: 2023-06-01 | Stop reason: HOSPADM

## 2023-06-01 RX ORDER — DEXAMETHASONE SODIUM PHOSPHATE 4 MG/ML
INJECTION, SOLUTION INTRA-ARTICULAR; INTRALESIONAL; INTRAMUSCULAR; INTRAVENOUS; SOFT TISSUE AS NEEDED
Status: DISCONTINUED | OUTPATIENT
Start: 2023-06-01 | End: 2023-06-01 | Stop reason: SURG

## 2023-06-01 RX ORDER — ALBUTEROL SULFATE 2.5 MG/3ML
2.5 SOLUTION RESPIRATORY (INHALATION) ONCE
Status: COMPLETED | OUTPATIENT
Start: 2023-06-01 | End: 2023-06-01

## 2023-06-01 RX ORDER — SODIUM CHLORIDE, SODIUM GLUCONATE, SODIUM ACETATE, POTASSIUM CHLORIDE AND MAGNESIUM CHLORIDE 526; 502; 368; 37; 30 MG/100ML; MG/100ML; MG/100ML; MG/100ML; MG/100ML
INJECTION, SOLUTION INTRAVENOUS CONTINUOUS PRN
Status: DISCONTINUED | OUTPATIENT
Start: 2023-06-01 | End: 2023-06-01 | Stop reason: SURG

## 2023-06-01 RX ORDER — EPHEDRINE SULFATE 50 MG/ML
5 INJECTION, SOLUTION INTRAVENOUS ONCE AS NEEDED
Status: DISCONTINUED | OUTPATIENT
Start: 2023-06-01 | End: 2023-06-01 | Stop reason: HOSPADM

## 2023-06-01 RX ORDER — EPHEDRINE SULFATE 50 MG/ML
INJECTION INTRAVENOUS AS NEEDED
Status: DISCONTINUED | OUTPATIENT
Start: 2023-06-01 | End: 2023-06-01 | Stop reason: SURG

## 2023-06-01 RX ORDER — PROPOFOL 10 MG/ML
INJECTION, EMULSION INTRAVENOUS AS NEEDED
Status: DISCONTINUED | OUTPATIENT
Start: 2023-06-01 | End: 2023-06-01 | Stop reason: SURG

## 2023-06-01 RX ORDER — SCOLOPAMINE TRANSDERMAL SYSTEM 1 MG/1
1 PATCH, EXTENDED RELEASE TRANSDERMAL CONTINUOUS
Status: DISCONTINUED | OUTPATIENT
Start: 2023-06-01 | End: 2023-06-01

## 2023-06-01 RX ORDER — SODIUM CHLORIDE 0.9 % (FLUSH) 0.9 %
SYRINGE (ML) INJECTION AS NEEDED
Status: DISCONTINUED | OUTPATIENT
Start: 2023-06-01 | End: 2023-06-01 | Stop reason: HOSPADM

## 2023-06-01 RX ORDER — PHENYLEPHRINE HYDROCHLORIDE 10 MG/ML
INJECTION INTRAVENOUS AS NEEDED
Status: DISCONTINUED | OUTPATIENT
Start: 2023-06-01 | End: 2023-06-01 | Stop reason: SURG

## 2023-06-01 RX ORDER — ENOXAPARIN SODIUM 100 MG/ML
40 INJECTION SUBCUTANEOUS DAILY
Status: DISCONTINUED | OUTPATIENT
Start: 2023-06-02 | End: 2023-06-04 | Stop reason: HOSPADM

## 2023-06-01 RX ORDER — OXYCODONE HYDROCHLORIDE 5 MG/1
5 TABLET ORAL EVERY 4 HOURS PRN
Status: DISCONTINUED | OUTPATIENT
Start: 2023-06-01 | End: 2023-06-04 | Stop reason: HOSPADM

## 2023-06-01 RX ORDER — DIPHENHYDRAMINE HYDROCHLORIDE 50 MG/ML
12.5 INJECTION INTRAMUSCULAR; INTRAVENOUS
Status: DISCONTINUED | OUTPATIENT
Start: 2023-06-01 | End: 2023-06-01 | Stop reason: HOSPADM

## 2023-06-01 RX ORDER — LABETALOL HYDROCHLORIDE 5 MG/ML
5 INJECTION, SOLUTION INTRAVENOUS
Status: DISCONTINUED | OUTPATIENT
Start: 2023-06-01 | End: 2023-06-01 | Stop reason: HOSPADM

## 2023-06-01 RX ORDER — GABAPENTIN 300 MG/1
600 CAPSULE ORAL ONCE
Status: COMPLETED | OUTPATIENT
Start: 2023-06-01 | End: 2023-06-01

## 2023-06-01 RX ORDER — ACETAMINOPHEN 325 MG/1
650 TABLET ORAL ONCE AS NEEDED
Status: DISCONTINUED | OUTPATIENT
Start: 2023-06-01 | End: 2023-06-01 | Stop reason: HOSPADM

## 2023-06-01 RX ORDER — DIPHENHYDRAMINE HCL 25 MG
25 CAPSULE ORAL
Status: DISCONTINUED | OUTPATIENT
Start: 2023-06-01 | End: 2023-06-01 | Stop reason: HOSPADM

## 2023-06-01 RX ORDER — MELOXICAM 15 MG/1
15 TABLET ORAL ONCE
Status: COMPLETED | OUTPATIENT
Start: 2023-06-01 | End: 2023-06-01

## 2023-06-01 RX ORDER — GABAPENTIN 300 MG/1
300 CAPSULE ORAL 3 TIMES DAILY
Status: DISPENSED | OUTPATIENT
Start: 2023-06-01 | End: 2023-06-04

## 2023-06-01 RX ORDER — FAMOTIDINE 20 MG/1
20 TABLET, FILM COATED ORAL 2 TIMES DAILY
Status: DISCONTINUED | OUTPATIENT
Start: 2023-06-01 | End: 2023-06-04 | Stop reason: HOSPADM

## 2023-06-01 RX ORDER — ROCURONIUM BROMIDE 10 MG/ML
INJECTION, SOLUTION INTRAVENOUS AS NEEDED
Status: DISCONTINUED | OUTPATIENT
Start: 2023-06-01 | End: 2023-06-01 | Stop reason: SURG

## 2023-06-01 RX ORDER — FENTANYL CITRATE 50 UG/ML
INJECTION, SOLUTION INTRAMUSCULAR; INTRAVENOUS AS NEEDED
Status: DISCONTINUED | OUTPATIENT
Start: 2023-06-01 | End: 2023-06-01 | Stop reason: SURG

## 2023-06-01 RX ORDER — ACETAMINOPHEN 650 MG/1
650 SUPPOSITORY RECTAL EVERY 4 HOURS PRN
Status: DISCONTINUED | OUTPATIENT
Start: 2023-06-01 | End: 2023-06-01 | Stop reason: HOSPADM

## 2023-06-01 RX ORDER — ACETAMINOPHEN 500 MG
1000 TABLET ORAL EVERY 6 HOURS
Status: DISPENSED | OUTPATIENT
Start: 2023-06-01 | End: 2023-06-03

## 2023-06-01 RX ORDER — DEXTROSE, SODIUM CHLORIDE, AND POTASSIUM CHLORIDE 5; .45; .15 G/100ML; G/100ML; G/100ML
100 INJECTION INTRAVENOUS CONTINUOUS
Status: DISCONTINUED | OUTPATIENT
Start: 2023-06-01 | End: 2023-06-04 | Stop reason: HOSPADM

## 2023-06-01 RX ORDER — HYDRALAZINE HYDROCHLORIDE 20 MG/ML
5 INJECTION INTRAMUSCULAR; INTRAVENOUS
Status: DISCONTINUED | OUTPATIENT
Start: 2023-06-01 | End: 2023-06-01 | Stop reason: HOSPADM

## 2023-06-01 RX ORDER — ACETAMINOPHEN 500 MG
1000 TABLET ORAL EVERY 6 HOURS
Status: DISCONTINUED | OUTPATIENT
Start: 2023-06-01 | End: 2023-06-01 | Stop reason: HOSPADM

## 2023-06-01 RX ORDER — ENOXAPARIN SODIUM 100 MG/ML
30 INJECTION SUBCUTANEOUS DAILY
Status: DISCONTINUED | OUTPATIENT
Start: 2023-06-02 | End: 2023-06-01 | Stop reason: DRUGHIGH

## 2023-06-01 RX ORDER — NALOXONE HCL 0.4 MG/ML
0.4 VIAL (ML) INJECTION AS NEEDED
Status: DISCONTINUED | OUTPATIENT
Start: 2023-06-01 | End: 2023-06-01 | Stop reason: HOSPADM

## 2023-06-01 RX ORDER — ONDANSETRON 2 MG/ML
INJECTION INTRAMUSCULAR; INTRAVENOUS AS NEEDED
Status: DISCONTINUED | OUTPATIENT
Start: 2023-06-01 | End: 2023-06-01 | Stop reason: SURG

## 2023-06-01 RX ORDER — SODIUM CHLORIDE, SODIUM GLUCONATE, SODIUM ACETATE, POTASSIUM CHLORIDE AND MAGNESIUM CHLORIDE 526; 502; 368; 37; 30 MG/100ML; MG/100ML; MG/100ML; MG/100ML; MG/100ML
1000 INJECTION, SOLUTION INTRAVENOUS CONTINUOUS PRN
Status: DISCONTINUED | OUTPATIENT
Start: 2023-06-01 | End: 2023-06-01 | Stop reason: HOSPADM

## 2023-06-01 RX ORDER — ALVIMOPAN 12 MG/1
12 CAPSULE ORAL 2 TIMES DAILY
Status: DISCONTINUED | OUTPATIENT
Start: 2023-06-02 | End: 2023-06-03

## 2023-06-01 RX ORDER — NALOXONE HCL 0.4 MG/ML
0.4 VIAL (ML) INJECTION
Status: DISCONTINUED | OUTPATIENT
Start: 2023-06-01 | End: 2023-06-04 | Stop reason: HOSPADM

## 2023-06-01 RX ORDER — ONDANSETRON 2 MG/ML
4 INJECTION INTRAMUSCULAR; INTRAVENOUS EVERY 6 HOURS PRN
Status: DISCONTINUED | OUTPATIENT
Start: 2023-06-01 | End: 2023-06-04 | Stop reason: HOSPADM

## 2023-06-01 RX ORDER — MIDAZOLAM HYDROCHLORIDE 1 MG/ML
INJECTION INTRAMUSCULAR; INTRAVENOUS AS NEEDED
Status: DISCONTINUED | OUTPATIENT
Start: 2023-06-01 | End: 2023-06-01 | Stop reason: SURG

## 2023-06-01 RX ORDER — LIDOCAINE HYDROCHLORIDE 20 MG/ML
INJECTION, SOLUTION EPIDURAL; INFILTRATION; INTRACAUDAL; PERINEURAL AS NEEDED
Status: DISCONTINUED | OUTPATIENT
Start: 2023-06-01 | End: 2023-06-01 | Stop reason: SURG

## 2023-06-01 RX ORDER — IPRATROPIUM BROMIDE AND ALBUTEROL SULFATE 2.5; .5 MG/3ML; MG/3ML
3 SOLUTION RESPIRATORY (INHALATION) ONCE AS NEEDED
Status: DISCONTINUED | OUTPATIENT
Start: 2023-06-01 | End: 2023-06-01 | Stop reason: HOSPADM

## 2023-06-01 RX ORDER — NEOSTIGMINE METHYLSULFATE 1 MG/ML
INJECTION, SOLUTION INTRAVENOUS AS NEEDED
Status: DISCONTINUED | OUTPATIENT
Start: 2023-06-01 | End: 2023-06-01 | Stop reason: SURG

## 2023-06-01 RX ADMIN — LIDOCAINE HYDROCHLORIDE 80 MG: 20 INJECTION, SOLUTION EPIDURAL; INFILTRATION; INTRACAUDAL; PERINEURAL at 07:16

## 2023-06-01 RX ADMIN — FAMOTIDINE 20 MG: 20 TABLET ORAL at 13:21

## 2023-06-01 RX ADMIN — NEOSTIGMINE METHYLSULFATE 3 MG: 0.5 INJECTION INTRAVENOUS at 09:29

## 2023-06-01 RX ADMIN — PHENYLEPHRINE HYDROCHLORIDE 50 MCG: 10 INJECTION INTRAVENOUS at 07:38

## 2023-06-01 RX ADMIN — SCOLOPAMINE TRANSDERMAL SYSTEM 1 PATCH: 1 PATCH, EXTENDED RELEASE TRANSDERMAL at 06:19

## 2023-06-01 RX ADMIN — MIDAZOLAM HYDROCHLORIDE 1 MG: 1 INJECTION, SOLUTION INTRAMUSCULAR; INTRAVENOUS at 07:05

## 2023-06-01 RX ADMIN — ACETAMINOPHEN 1000 MG: 500 TABLET, FILM COATED ORAL at 06:19

## 2023-06-01 RX ADMIN — EPHEDRINE SULFATE 10 MG: 50 INJECTION INTRAVENOUS at 08:09

## 2023-06-01 RX ADMIN — FENTANYL CITRATE 25 MCG: 50 INJECTION, SOLUTION INTRAMUSCULAR; INTRAVENOUS at 08:16

## 2023-06-01 RX ADMIN — MIDAZOLAM HYDROCHLORIDE 1 MG: 1 INJECTION, SOLUTION INTRAMUSCULAR; INTRAVENOUS at 07:12

## 2023-06-01 RX ADMIN — ROCURONIUM BROMIDE 50 MG: 10 INJECTION INTRAVENOUS at 07:16

## 2023-06-01 RX ADMIN — PHENYLEPHRINE HYDROCHLORIDE 50 MCG: 10 INJECTION INTRAVENOUS at 07:55

## 2023-06-01 RX ADMIN — GLYCOPYRROLATE 0.4 MG: 0.2 INJECTION, SOLUTION INTRAMUSCULAR; INTRAVITREAL at 09:29

## 2023-06-01 RX ADMIN — EPHEDRINE SULFATE 5 MG: 50 INJECTION INTRAVENOUS at 08:37

## 2023-06-01 RX ADMIN — FENTANYL CITRATE 50 MCG: 50 INJECTION, SOLUTION INTRAMUSCULAR; INTRAVENOUS at 07:11

## 2023-06-01 RX ADMIN — Medication 2 G: at 07:21

## 2023-06-01 RX ADMIN — POTASSIUM CHLORIDE, DEXTROSE MONOHYDRATE AND SODIUM CHLORIDE 100 ML/HR: 150; 5; 450 INJECTION, SOLUTION INTRAVENOUS at 13:17

## 2023-06-01 RX ADMIN — PHENYLEPHRINE HYDROCHLORIDE 50 MCG: 10 INJECTION INTRAVENOUS at 08:09

## 2023-06-01 RX ADMIN — ONDANSETRON 4 MG: 2 INJECTION INTRAMUSCULAR; INTRAVENOUS at 09:27

## 2023-06-01 RX ADMIN — ACETAMINOPHEN 1000 MG: 500 TABLET, FILM COATED ORAL at 13:21

## 2023-06-01 RX ADMIN — FENTANYL CITRATE 25 MCG: 50 INJECTION, SOLUTION INTRAMUSCULAR; INTRAVENOUS at 09:15

## 2023-06-01 RX ADMIN — FAMOTIDINE 20 MG: 20 TABLET ORAL at 21:31

## 2023-06-01 RX ADMIN — ALVIMOPAN 12 MG: 12 CAPSULE ORAL at 06:19

## 2023-06-01 RX ADMIN — DEXAMETHASONE SODIUM PHOSPHATE 4 MG: 4 INJECTION, SOLUTION INTRAMUSCULAR; INTRAVENOUS at 07:48

## 2023-06-01 RX ADMIN — ACETAMINOPHEN 1000 MG: 500 TABLET, FILM COATED ORAL at 21:31

## 2023-06-01 RX ADMIN — SODIUM CHLORIDE, SODIUM GLUCONATE, SODIUM ACETATE, POTASSIUM CHLORIDE AND MAGNESIUM CHLORIDE: 526; 502; 368; 37; 30 INJECTION, SOLUTION INTRAVENOUS at 07:20

## 2023-06-01 RX ADMIN — SODIUM CHLORIDE, SODIUM GLUCONATE, SODIUM ACETATE, POTASSIUM CHLORIDE AND MAGNESIUM CHLORIDE 1000 ML: 526; 502; 368; 37; 30 INJECTION, SOLUTION INTRAVENOUS at 06:18

## 2023-06-01 RX ADMIN — MELOXICAM 15 MG: 15 TABLET ORAL at 06:19

## 2023-06-01 RX ADMIN — PHENYLEPHRINE HYDROCHLORIDE 100 MCG: 10 INJECTION INTRAVENOUS at 08:01

## 2023-06-01 RX ADMIN — OXYCODONE HYDROCHLORIDE 5 MG: 5 TABLET ORAL at 21:34

## 2023-06-01 RX ADMIN — PHENYLEPHRINE HYDROCHLORIDE 50 MCG: 10 INJECTION INTRAVENOUS at 07:51

## 2023-06-01 RX ADMIN — PROPOFOL 20 MG: 10 INJECTION, EMULSION INTRAVENOUS at 09:17

## 2023-06-01 RX ADMIN — ALBUTEROL SULFATE 2.5 MG: 2.5 SOLUTION RESPIRATORY (INHALATION) at 06:19

## 2023-06-01 RX ADMIN — PHENYLEPHRINE HYDROCHLORIDE 100 MCG: 10 INJECTION INTRAVENOUS at 07:47

## 2023-06-01 RX ADMIN — GABAPENTIN 300 MG: 300 CAPSULE ORAL at 21:31

## 2023-06-01 RX ADMIN — PROPOFOL 100 MG: 10 INJECTION, EMULSION INTRAVENOUS at 07:16

## 2023-06-01 RX ADMIN — GABAPENTIN 600 MG: 300 CAPSULE ORAL at 06:19

## 2023-06-01 NOTE — OP NOTE
COLON RESECTION RIGHT OR TRANSVERSE LAPAROSCOPIC  Procedure Note    Jo Ann Lacy  6/1/2023    Pre-op Diagnosis:   Tubular adenoma of colon [D12.6]    Post-op Diagnosis:     Post-Op Diagnosis Codes:     * Tubular adenoma of colon [D12.6]    Procedure/CPT® Codes:      Procedure(s):  LAPAROSCOPIC ASSISTED RIGHT COLON RESECTION    Surgeon(s):  Last Miranda MD    Anesthesia: General    Staff:   Circulator: Jeanne Oneil RN; Celina Juarez RN  Scrub Person: Renee Wu  Assistant: Radha Mcgill CSA    Assistant: Radha Mcgill CSA was responsible for performing the following activities: Retraction, Suction, Irrigation, Suturing, Closing and Placing Dressing and their skilled assistance was necessary for the success of this case.     Estimated Blood Loss: minimal    Specimens:                ID Type Source Tests Collected by Time   A : right colon, for adenomatous polyp, please open and return Tissue Large Intestine, Right / Ascending Colon TISSUE EXAM, [facility] ([facility], [facility], [facility]) Last Miranda MD 6/1/2023 0756         Drains:   Urethral Catheter Non-latex 16 Fr. (Active)       Indications: 62-year-old female tubular adenomatous polyp abdominal hepatic flexure unable to be completely resected endoscopically presents now for laparoscopic right colectomy    Findings: Polyp easily identified and completely resected.  No suggestion of any metastatic disease grossly.    Complications: None    Procedure: The patient was brought to the operating room under ERAS protocol. She was placed under general endotracheal anesthesia without any difficulty. She had a Gauthier catheter placed. She had SCDs in place. She received Mefoxin IV antibiotics preoperatively. Abdomen was prepped and draped in normal sterile fashion. Appropriate timeout was taken. Everyone was in agreement. Cut down was performed a couple of centimeters above the umbilicus to the right of midline. A 10/11 Miguelangel trocar was placed without any  difficulty. This is where the planned removal site for the colon was going to be done. A 5 mm trocar was placed in the left abdomen. A 5 mm trocar was placed in the lower midline. A 5 mm trocar was placed in the right lower quadrant. Tap abdominal wall block was performed with a total of 30 mL of xalapril, 20 mL were in injected in the right side of the abdomen and 10 mL in the left side using the laparoscope for guidance. There were some adhesions up in the right upper quadrant and these were taken down easily. The cecum was already somewhat mobile. We took retroperitoneal attachments down the right side of the cecum and colon. We used the appendix to retract the cecum. It freed the cecum up well along with the terminal ileum. We then followed this up to the hepatic flexure. The tattooing was clearly identified in the distal ascending colon. The colon bent back on itself and hepatic flexure and was somewhat redundant in this area. We carefully took this down freeing this up from the liver as well as from the stomach medially. We rolled the colon medially. We felt we had the colon well mobilized at this point. We then extended our cutdown incision laterally approximately 6cm more incision. We then put a wound protector into position. The cecum was well mobilized. There were a few adhesions that we took down from the transverse colon, but it too was well mobilized. The duodenum was seen and not injured during or after dissection. Mesentery was easily mobilized. The ileocolic vessels were easily identified and the tattooing had tattooed some of the lymph nodes that were present. The lymph nodes do not appear to be abnormal. We freed up more adhesions to the terminal ileum to make sure it was free. We then proceeded with right colectomy. We divided the colon proximal to the middle colic vessels with a 75 WILBERT stapler. Then, divided the terminal ileum a few centimeters from the ileocecal valve with the 75 WILBERT stapler.  The ileocolic vessels were taken at the base with a right angle and then 2-0 Vicryl suture ligature, then 2-0 Vicryl tie. Good hemostasis was noted at completion. Specimen was sent to pathology and was opened and brought back. The polyp had been completely removed and appeared to be a benign-looking polyp at this point. We then proceeded to a side-to-side functional end-to-end anastomosis with the distal ileum to the proximal transverse colon. We did this with a 75 WILBERT stapler and then the common opening was closed with a TA 60 stapler. Final staple line was oversewn with interrupted 3-0 Vicryl interrupted figure-of-eight stitches. At the completion, bowel on both sides of anastomosis were pink and viable. There was no tension on the anastomosis. We irrigated and good hemostasis was noted throughout the areas of dissection. The fascia was then closed in 2 layers with #1 running PDS suture. Subcutaneous was irrigated and we closed the skin with staples and Prevena was used for dressing. The laparoscopic skin incisions were closed with 4-0 Monocryl simple stitches and glue. Please note that after the anastomosis was completed, we changed our outer gloves, everyone in the room did for the rest of the case. The patient was awakened, extubated and transferred to recovery in awake and stable condition.           Colon Resection  Operation performed with curative intent Yes   Tumor Location (select all that apply) Ascending colon   Extent of colon and vascular resection  (select all that apply) Right hemicolectomy - ileocolic, right colic (if present)          Disposition: Transfer to recovery in stable condition        Last Miranda MD     Date: 6/1/2023  Time: 09:50 CDT

## 2023-06-01 NOTE — ANESTHESIA PROCEDURE NOTES
Peripheral IV    Start time: 6/1/2023 7:17 AM  End time: 6/1/2023 7:18 AM  Line placed for Fluids/Medication Admin.  Performed By   CRNA/CAA: Tammie Shore CRNA  Preanesthetic Checklist  Completed: patient identified, IV checked, site marked, risks and benefits discussed, surgical consent, monitors and equipment checked, pre-op evaluation and timeout performed  Peripheral IV Prep   Patient position: supine   Prep: ChloraPrep  Patient monitoring: heart rate, cardiac monitor and continuous pulse ox  Peripheral IV Procedure   Laterality:right  Location:  Hand  Catheter size: 18 G          Post Assessment   Dressing Type: transparent.    IV Dressing/Site: clean, dry and intact

## 2023-06-01 NOTE — THERAPY EVALUATION
Patient Name: Jo Ann Lacy  : 1960    MRN: [id number]                              Today's Date: 2023       Admit Date: 2023    Visit Dx:     ICD-10-CM ICD-9-CM   1. Tubular adenoma of colon  D12.6 211.3   2. Impaired functional mobility, balance, gait, and endurance  Z74.09 V49.89     Patient Active Problem List   Diagnosis   • Tubular adenoma of colon   • Villous adenoma of colon   • Benign polyp of large intestine   • Essential hypertension   • Vitamin D deficiency   • Hypertriglyceridemia   • Hypercholesteremia   • Acute bilateral knee pain   • Dyslipidemia   • Stress   • Anxiety   • History of colon polyps     Past Medical History:   Diagnosis Date   • Arthritis    • Benign polyp of large intestine    • Elevated cholesterol    • Essential hypertension    • Gastroesophageal reflux disease    • History of transfusion    • Hypertension    • Postmenopausal state    • Tubular adenoma of colon    • Villous adenoma of colon     1.6cm VTA with intermediate grade dysplasia transverse.  1.0cm VTA with intermediate grade dysplasia ascending   • Vitamin D deficiency      Past Surgical History:   Procedure Laterality Date   • COLONOSCOPY  2016    Many 4-5 mm polyps in the transverse colon and in the ascending colon.Resected and retrieved.External and internal hemorrhoids   • COLONOSCOPY N/A 03/10/2023    Procedure: COLONOSCOPY;  Surgeon: Last Miranda MD;  Location: [facility];  Service: Gastroenterology;  Laterality: N/A;   • ECTOPIC PREGNANCY SURGERY     • KNEE SURGERY Left    • TOTAL ABDOMINAL HYSTERECTOMY WITH SALPINGO OOPHORECTOMY        General Information     Row Name 23 1237          Physical Therapy Time and Intention    Document Type evaluation  -     Mode of Treatment individual therapy;physical therapy  -     Row Name 23 1237          General Information    Patient Profile Reviewed yes  -HM     Prior Level of Function independent:;all household  mobility;community mobility;ADL's;home management;driving;shopping  -     Existing Precautions/Restrictions fall  -     Barriers to Rehab none identified  -     Row Name 06/01/23 1237          Living Environment    People in Home alone  -     Row Name 06/01/23 1237          Home Main Entrance    Number of Stairs, Main Entrance none  -     Row Name 06/01/23 1237          Stairs Within Home, Primary    Number of Stairs, Within Home, Primary none  -     Row Name 06/01/23 1237          Cognition    Orientation Status (Cognition) oriented x 4  -     Row Name 06/01/23 1237          Safety Issues, Functional Mobility    Safety Issues Affecting Function (Mobility) ability to follow commands;insight into deficits/self-awareness;positioning of assistive device;problem-solving;safety precaution awareness;safety precautions follow-through/compliance;sequencing abilities  -     Impairments Affecting Function (Mobility) endurance/activity tolerance;pain;strength  -           User Key  (r) = Recorded By, (t) = Taken By, (c) = Cosigned By    Initials Name Provider Type     Jeanne Dotson PT Physical Therapist               Mobility     Row Name 06/01/23 1237          Bed Mobility    Bed Mobility supine-sit;sit-supine  -     Supine-Sit [place] (Bed Mobility) minimum assist (75% patient effort)  -     Sit-Supine [place] (Bed Mobility) standby assist  -     Assistive Device (Bed Mobility) head of bed elevated  -     Comment, (Bed Mobility) Increased time  -     Row Name 06/01/23 1237          Sit-Stand Transfer    Sit-Stand [place] (Transfers) minimum assist (75% patient effort)  -     Assistive Device (Sit-Stand Transfers) walker, front-wheeled  -     Row Name 06/01/23 1237          Gait/Stairs (Locomotion)    [place] Level (Gait) contact guard  -     Assistive Device (Gait) walker, front-wheeled  -     Distance in Feet (Gait) Sidestepping along EOB  -           User Key   (r) = Recorded By, (t) = Taken By, (c) = Cosigned By    Initials Name Provider Type     Jeanne Dotson PT Physical Therapist               Obj/Interventions     Row Name 06/01/23 1237          Range of Motion Comprehensive    General Range of Motion bilateral lower extremity ROM WFL  -     Row Name 06/01/23 1237          Strength Comprehensive (MMT)    General Manual Muscle Testing (MMT) Assessment lower extremity strength deficits identified  -     Comment, General Manual Muscle Testing (MMT) Assessment BLE 3+/5; B ankle DF 5/5  -     Row Name 06/01/23 1237          Balance    Balance Assessment sitting static balance;standing static balance  -     Static Sitting Balance standby assist  -HM     Position, Sitting Balance supported;sitting edge of bed  -     Static Standing Balance contact guard  -     Position/Device Used, Standing Balance supported;walker, front-wheeled  -     Row Name 06/01/23 1237          Sensory Assessment (Somatosensory)    Sensory Assessment (Somatosensory) LE sensation intact  -           User Key  (r) = Recorded By, (t) = Taken By, (c) = Cosigned By    Initials Name Provider Type     Jeanne Dotson PT Physical Therapist               Goals/Plan     Row Name 06/01/23 1237          Bed Mobility Goal 1 (PT)    Activity/Assistive Device (Bed Mobility Goal 1, PT) bed mobility activities, all  -HM     [place] Level/Cues Needed (Bed Mobility Goal 1, PT) modified independence  -HM     Time Frame (Bed Mobility Goal 1, PT) by discharge  -     Progress/Outcomes (Bed Mobility Goal 1, PT) new goal;goal not met  -     Row Name 06/01/23 1237          Transfer Goal 1 (PT)    Activity/Assistive Device (Transfer Goal 1, PT) sit-to-stand/stand-to-sit;bed-to-chair/chair-to-bed  -HM     [place] Level/Cues Needed (Transfer Goal 1, PT) modified independence  -HM     Time Frame (Transfer Goal 1, PT) by discharge  -HM     Progress/Outcome (Transfer Goal 1, PT) new  goal;goal not met  -     Row Name 06/01/23 1237          Gait Training Goal 1 (PT)    Activity/Assistive Device (Gait Training Goal 1, PT) gait (walking locomotion);increase endurance/gait distance  -     [place] Level (Gait Training Goal 1, PT) modified independence  -     Distance (Gait Training Goal 1, PT) 25' x 2  -HM     Time Frame (Gait Training Goal 1, PT) by discharge  -     Progress/Outcome (Gait Training Goal 1, PT) new goal;goal not met  -     Row Name 06/01/23 1237          Problem Specific Goal 1 (PT)    Problem Specific Goal 1 (PT) Improve LE strength to 4/5  -HM     Time Frame (Problem Specific Goal 1, PT) by discharge  -     Progress/Outcome (Problem Specific Goal 1, PT) new goal;goal not met  -     Row Name 06/01/23 1237          Therapy Assessment/Plan (PT)    Planned Therapy Interventions (PT) balance training;bed mobility training;gait training;neuromuscular re-education;patient/family education;postural re-education;strengthening;transfer training  -           User Key  (r) = Recorded By, (t) = Taken By, (c) = Cosigned By    Initials Name Provider Type     Jeanne Dotson, PT Physical Therapist               Clinical Impression     Row Name 06/01/23 1237          Pain    Pretreatment Pain Rating 10/10  -     Posttreatment Pain Rating 7/10  -HM     Pain Location - abdomen  -     Pain Intervention(s) Repositioned;Rest;Ambulation/increased activity  -     Row Name 06/01/23 1237          Plan of Care Review    Plan of Care Reviewed With patient  -     Outcome Evaluation PT Isi completed s/p colon resection. Patient asleep but wakes up however kept eyes closed most of the session. Participated as well she could with increased time for tasks due to pain. Patient required Edward for sup>sit w/HOB raised and cues provided for sequencing. Fair sitting balance at SBA and demonstrates decreased LE strength. Sensation intact. Patient required CGA for STS to FWW and  sidestepping along EOB w/assist at times for managing AD. SBA for return to supine. Patient lives alone and does not have anyone to stay with her. Pending progression toward goals, may need further rehab before going home.  -     Row Name 06/01/23 1237          Therapy Assessment/Plan (PT)    Rehab Potential (PT) good, to achieve stated therapy goals  -     Criteria for Skilled Interventions Met (PT) yes;meets criteria;skilled treatment is necessary  -     Therapy Frequency (PT) 2 times/day  -     Row Name 06/01/23 1237          Vital Signs    Pre Systolic BP Rehab 109  -HM     Pre Treatment Diastolic BP 65  -HM     Pretreatment Heart Rate (beats/min) 81  -HM     Pre SpO2 (%) 92  -     O2 Delivery Pre Treatment room air  -     Pre Patient Position Supine  -     Intra Patient Position Standing  -     Post Patient Position Supine  -     Row Name 06/01/23 1237          Positioning and Restraints    Pre-Treatment Position in bed  -     Post Treatment Position bed  -HM     In Bed notified nsg;supine;call light within reach;encouraged to call for assist;exit alarm on;SCD pump applied;side rails up x2  -           User Key  (r) = Recorded By, (t) = Taken By, (c) = Cosigned By    Initials Name Provider Type     Jeanne Dotson, PT Physical Therapist               Outcome Measures     Row Name 06/01/23 1237          How much help from another person do you currently need...    Turning from your back to your side while in flat bed without using bedrails? 4  -HM     Moving from lying on back to sitting on the side of a flat bed without bedrails? 3  -HM     Moving to and from a bed to a chair (including a wheelchair)? 3  -HM     Standing up from a chair using your arms (e.g., wheelchair, bedside chair)? 3  -HM     Climbing 3-5 steps with a railing? 2  -HM     To walk in hospital room? 3  -HM     AM-PAC 6 Clicks Score (PT) 18  -     Highest level of mobility 6 --> Walked 10 steps or more  -     Enzo  Name 06/01/23 1237          Functional Assessment    Outcome Measure Options AM-PAC 6 Clicks Basic Mobility (PT)  -           User Key  (r) = Recorded By, (t) = Taken By, (c) = Cosigned By    Initials Name Provider Type     Jeanne Dotson PT Physical Therapist                             Physical Therapy Education     Title: PT OT SLP Therapies (In Progress)     Topic: Physical Therapy (In Progress)     Point: Mobility training (Done)     Learning Progress Summary           Patient Acceptance, E, VU,NR by  at 6/1/2023 1308    Comment: POC and goals                   Point: Home exercise program (Not Started)     Learner Progress:  Not documented in this visit.          Point: Body mechanics (Not Started)     Learner Progress:  Not documented in this visit.          Point: Precautions (Not Started)     Learner Progress:  Not documented in this visit.                      User Key     Initials Effective Dates Name Provider Type Discipline     01/09/23 -  Jeanne Dotson PT Physical Therapist PT              PT Recommendation and Plan  Planned Therapy Interventions (PT): balance training, bed mobility training, gait training, neuromuscular re-education, patient/family education, postural re-education, strengthening, transfer training  Plan of Care Reviewed With: patient  Outcome Evaluation: PT Isi completed s/p colon resection. Patient asleep but wakes up however kept eyes closed most of the session. Participated as well she could with increased time for tasks due to pain. Patient required Edward for sup>sit w/HOB raised and cues provided for sequencing. Fair sitting balance at SBA and demonstrates decreased LE strength. Sensation intact. Patient required CGA for STS to FWW and sidestepping along EOB w/assist at times for managing AD. SBA for return to supine. Patient lives alone and does not have anyone to stay with her. Pending progression toward goals, may need further rehab before going home.     Time  Calculation:    PT Charges     Row Name 06/01/23 1237             Time Calculation    Start Time 1237  -HM      Stop Time 1309  -HM      Time Calculation (min) 32 min  -HM      PT Received On 06/01/23  -HM      PT Goal Re-Cert Due Date 06/14/23  -         Untimed Charges    PT Eval/Re-eval Minutes 32  -HM         Total Minutes    Untimed Charges Total Minutes 32  -HM       Total Minutes 32  -HM            User Key  (r) = Recorded By, (t) = Taken By, (c) = Cosigned By    Initials Name Provider Type     Jeanne Dotson, PT Physical Therapist              Therapy Charges for Today     Code Description Service Date Service Provider Modifiers Qty    [id number] HC PT EVAL MOD COMPLEXITY 2 6/1/2023 Jeanne Dotson, PT GP 1          PT G-Codes  Outcome Measure Options: AM-PAC 6 Clicks Basic Mobility (PT)  AM-PAC 6 Clicks Score (PT): 18  PT Discharge Summary  Anticipated Discharge Disposition (PT): home with 24/7 care, home with home health, LT[facility] (long-term care hospital), skilled nursing facility    Jeanne Dotson PT  6/1/2023

## 2023-06-01 NOTE — PLAN OF CARE
Goal Outcome Evaluation:  Plan of Care Reviewed With: patient           Outcome Evaluation: PT Isi completed s/p colon resection. Patient asleep but wakes up however kept eyes closed most of the session. Participated as well she could with increased time for tasks due to pain. Patient required Edward for sup>sit w/HOB raised and cues provided for sequencing. Fair sitting balance at SBA and demonstrates decreased LE strength. Sensation intact. Patient required CGA for STS to FWW and sidestepping along EOB w/assist at times for managing AD. SBA for return to supine. Patient lives alone and does not have anyone to stay with her. Pending progression toward goals, may need further rehab before going home.

## 2023-06-01 NOTE — THERAPY EVALUATION
Patient Name: Jo Ann Lacy  : 1960    MRN: [id number]                              Today's Date: 2023       Admit Date: 2023    Visit Dx:     ICD-10-CM ICD-9-CM   1. Tubular adenoma of colon  D12.6 211.3   2. Impaired functional mobility, balance, gait, and endurance  Z74.09 V49.89   3. Impaired mobility and ADLs  Z74.09 V49.89    Z78.9      Patient Active Problem List   Diagnosis   • Tubular adenoma of colon   • Villous adenoma of colon   • Benign polyp of large intestine   • Essential hypertension   • Vitamin D deficiency   • Hypertriglyceridemia   • Hypercholesteremia   • Acute bilateral knee pain   • Dyslipidemia   • Stress   • Anxiety   • History of colon polyps     Past Medical History:   Diagnosis Date   • Arthritis    • Benign polyp of large intestine    • Elevated cholesterol    • Essential hypertension    • Gastroesophageal reflux disease    • History of transfusion    • Hypertension    • Postmenopausal state    • Tubular adenoma of colon    • Villous adenoma of colon     1.6cm VTA with intermediate grade dysplasia transverse.  1.0cm VTA with intermediate grade dysplasia ascending   • Vitamin D deficiency      Past Surgical History:   Procedure Laterality Date   • COLONOSCOPY  2016    Many 4-5 mm polyps in the transverse colon and in the ascending colon.Resected and retrieved.External and internal hemorrhoids   • COLONOSCOPY N/A 03/10/2023    Procedure: COLONOSCOPY;  Surgeon: Last Miranda MD;  Location: [facility] ENDOSCOPY;  Service: Gastroenterology;  Laterality: N/A;   • ECTOPIC PREGNANCY SURGERY     • KNEE SURGERY Left    • TOTAL ABDOMINAL HYSTERECTOMY WITH SALPINGO OOPHORECTOMY        General Information     Row Name 23 1430          OT Time and Intention    Document Type evaluation  -     Mode of Treatment individual therapy;occupational therapy  -     Row Name 23 1430          General Information    Patient Profile Reviewed yes  -MC     Prior Level of  Function independent:;all household mobility;home management;dressing;bathing;ADL's;driving  -     Existing Precautions/Restrictions fall  -[facility] Name 06/01/23 1430          Living Environment    People in Home alone  -[facility] Name 06/01/23 1430          Home Main Entrance    Number of Stairs, Main Entrance none  -[facility] Name 06/01/23 1430          Stairs Within Home, Primary    Number of Stairs, Within Home, Primary none  -[facility] Name 06/01/23 1430          Cognition    Orientation Status (Cognition) oriented x 4  -[facility] Name 06/01/23 1430          Safety Issues, Functional Mobility    Safety Issues Affecting Function (Mobility) awareness of need for assistance;insight into deficits/self-awareness  -     Impairments Affecting Function (Mobility) endurance/activity tolerance;pain;strength  -           User Key  (r) = Recorded By, (t) = Taken By, (c) = Cosigned By    Initials Name Provider Type     Destiney Henriquez OT Occupational Therapist                 Mobility/ADL's     Row Name 06/01/23 1430          Bed Mobility    Bed Mobility supine-sit;sit-supine  -     Supine-Sit [place] (Bed Mobility) contact guard  -MC     Sit-Supine [place] (Bed Mobility) contact [facility]     Assistive Device (Bed Mobility) head of bed elevated  -[facility] Name 06/01/23 1430          Transfers    Transfers sit-stand transfer;stand-sit transfer;toilet transfer  -[facility] Name 06/01/23 1430          Sit-Stand Transfer    Sit-Stand [place] (Transfers) contact [facility]     Assistive Device (Sit-Stand Transfers) walker, front-wheeled  -[facility] Name 06/01/23 1430          Stand-Sit Transfer    Stand-Sit [place] (Transfers) contact [facility]     Assistive Device (Stand-Sit Transfers) walker, front-wheeled  -[facility] Name 06/01/23 1430          Toilet Transfer    Type (Toilet Transfer) sit-stand;stand-sit  -     [place] Level (Toilet Transfer) contact [facility]      Assistive Device (Toilet Transfer) walker, front-wheeled  -           User Key  (r) = Recorded By, (t) = Taken By, (c) = Cosigned By    Initials Name Provider Type    Destiney King OT Occupational Therapist               Obj/Interventions     Row Name 06/01/23 1430          Sensory Assessment (Somatosensory)    Sensory Assessment (Somatosensory) UE sensation intact  -MC     Row Name 06/01/23 1430          Range of Motion Comprehensive    General Range of Motion bilateral upper extremity ROM WFL  -[facility] 06/01/23 1430          Strength Comprehensive (MMT)    Comment, General Manual Muscle Testing (MMT) Assessment BUE 4-/5 grossly, except R shoulder flex 3+/5  -           User Key  (r) = Recorded By, (t) = Taken By, (c) = Cosigned By    Initials Name Provider Type    Destiney King OT Occupational Therapist               Goals/Plan     Row Name 06/01/23 1430          Transfer Goal 1 (OT)    Activity/Assistive Device (Transfer Goal 1, OT) transfers, all  -     [place] Level/Cues Needed (Transfer Goal 1, OT) modified independence  -MC     Time Frame (Transfer Goal 1, OT) long term goal (LTG);by discharge  -     Progress/Outcome (Transfer Goal 1, OT) goal not met  -[facility] 06/01/23 1430          Dressing Goal 1 (OT)    Activity/Device (Dressing Goal 1, OT) dressing skills, all  -     [place]/Cues Needed (Dressing Goal 1, OT) independent  -MC     Time Frame (Dressing Goal 1, OT) long term goal (LTG);by discharge  -     Progress/Outcome (Dressing Goal 1, OT) goal not met  -[facility] 06/01/23 1430          Toileting Goal 1 (OT)    Activity/Device (Toileting Goal 1, OT) toileting skills, all  -     [place] Level/Cues Needed (Toileting Goal 1, OT) independent  -     Time Frame (Toileting Goal 1, OT) long term goal (LTG);by discharge  -     Progress/Outcome (Toileting Goal 1, OT) goal not met  -[facility] 06/01/23 1430          Problem Specific Goal 1  (OT)    Problem Specific Goal 1 (OT) Pt will tolerate participating in OOB fxnl activities for ~25 minutes to improve activity tolerance.  -     Time Frame (Problem Specific Goal 1, OT) long term goal (LTG);by discharge  -     Progress/Outcome (Problem Specific Goal 1, OT) goal not met  -     Row Name 06/01/23 1430          Therapy Assessment/Plan (OT)    Planned Therapy Interventions (OT) activity tolerance training;adaptive equipment training;BADL retraining;cognitive/visual perception retraining;edema control/reduction;functional balance retraining;IADL retraining;passive ROM/stretching;occupation/activity based interventions;neuromuscular control/coordination retraining;manual therapy/joint mobilization;patient/caregiver education/training;ROM/therapeutic exercise;strengthening exercise;transfer/mobility retraining  -           User Key  (r) = Recorded By, (t) = Taken By, (c) = Cosigned By    Initials Name Provider Type     Destiney Henriquez, OT Occupational Therapist               Clinical Impression     Row Name 06/01/23 1430          Pain Assessment    Pretreatment Pain Rating 8/10  -     Posttreatment Pain Rating 5/10  -     Pain Location incisional  -     Pain Location - abdomen  -     Pain Intervention(s) Medication (See MAR);Ambulation/increased activity;Distraction;Repositioned  -     Row Name 06/01/23 1430          Plan of Care Review    Plan of Care Reviewed With patient  -     Progress no change  -     Outcome Evaluation OT eval completed. Pt fowlers in bed upon entering, lethargic, however agreeable to the session. Pt CGA for sup<>sit and sit<>sup with HOB elevated. Pt BUE 4-/5 grossly, except R shoulder flex 3+/5. Pt CGA for sit<>stand t/f and toilet t/f with RW. Pt CGA for fxnl mobility with RW. Pt demonstrates decreased fxnl mobility, strength, and independence in ADLs. Pt would benefit from skilled OT services. Recommend d/c home with assist, HHOT pending pt progress. Pt  may also need a FWW.  -     Row Name 06/01/23 1430          Therapy Assessment/Plan (OT)    Patient/Family Therapy Goal Statement (OT) return home  -     Rehab Potential (OT) good, to achieve stated therapy goals  -     Criteria for Skilled Therapeutic Interventions Met (OT) yes;meets criteria;skilled treatment is necessary  -     Therapy Frequency (OT) 2 times/wk  -     Predicted Duration of Therapy Intervention (OT) until all goals met or d/c  -     Row Name 06/01/23 1430          Therapy Plan Review/Discharge Plan (OT)    Equipment Needs Upon Discharge (OT) walker, rolling  -     Anticipated Discharge Disposition (OT) home with assist;home with home health  -     Row Name 06/01/23 1430          Vital Signs    Pre Systolic BP Rehab 112  -MC     Pre Treatment Diastolic BP 72  -MC     Pre Patient Position Sitting  -     Row Name 06/01/23 1430          Positioning and Restraints    Pre-Treatment Position in bed  -     Post Treatment Position bed  -     In Bed supine;call light within reach;encouraged to call for assist;exit alarm on  -           User Key  (r) = Recorded By, (t) = Taken By, (c) = Cosigned By    Initials Name Provider Type    Destiney King, OT Occupational Therapist               Outcome Measures     Row Name 06/01/23 1430          How much help from another is currently needed...    Putting on and taking off regular lower body clothing? 3  -MC     Bathing (including washing, rinsing, and drying) 3  -MC     Toileting (which includes using toilet bed pan or urinal) 4  -MC     Putting on and taking off regular upper body clothing 4  -MC     Taking care of personal grooming (such as brushing teeth) 4  -MC     Eating meals 4  -     AM-PAC 6 Clicks Score (OT) 22  -     Row Name 06/01/23 1237          How much help from another person do you currently need...    Turning from your back to your side while in flat bed without using bedrails? 4  -HM     Moving from lying on  back to sitting on the side of a flat bed without bedrails? 3  -HM     Moving to and from a bed to a chair (including a wheelchair)? 3  -HM     Standing up from a chair using your arms (e.g., wheelchair, bedside chair)? 3  -HM     Climbing 3-5 steps with a railing? 2  -HM     To walk in hospital room? 3  -HM     AM-PAC 6 Clicks Score (PT) 18  -HM     Highest level of mobility 6 --> Walked 10 steps or more  -     Row Name 06/01/23 1430 06/01/23 1237       Functional Assessment    Outcome Measure Options AM-PAC 6 Clicks Daily Activity (OT)  - AM-PAC 6 Clicks Basic Mobility (PT)  -          User Key  (r) = Recorded By, (t) = Taken By, (c) = Cosigned By    Initials Name Provider Type     Destiney Henriquez, OT Occupational Therapist     Jeanne Dotson, PT Physical Therapist                Occupational Therapy Education     Title: PT OT SLP Therapies (In Progress)     Topic: Occupational Therapy (In Progress)     Point: ADL training (Done)     Description:   Instruct learner(s) on proper safety adaptation and remediation techniques during self care or transfers.   Instruct in proper use of assistive devices.              Learning Progress Summary           Patient Acceptance, E,TB, VU by  at 6/1/2023 1531    Comment: OT role, POC, t/f training                   Point: Home exercise program (Not Started)     Description:   Instruct learner(s) on appropriate technique for monitoring, assisting and/or progressing therapeutic exercises/activities.              Learner Progress:  Not documented in this visit.          Point: Precautions (Done)     Description:   Instruct learner(s) on prescribed precautions during self-care and functional transfers.              Learning Progress Summary           Patient Acceptance, E,TB, VU by  at 6/1/2023 1531    Comment: OT role, POC, t/f training                   Point: Body mechanics (Done)     Description:   Instruct learner(s) on proper positioning and spine alignment  during self-care, functional mobility activities and/or exercises.              Learning Progress Summary           Patient Acceptance, E,TB, VU by  at 6/1/2023 1531    Comment: OT role, POC, t/f training                               User Key     Initials Effective Dates Name Provider Type Discipline     10/19/22 -  Destiney Henriquez, QUINTEN Occupational Therapist OT              OT Recommendation and Plan  Planned Therapy Interventions (OT): activity tolerance training, adaptive equipment training, BADL retraining, cognitive/visual perception retraining, edema control/reduction, functional balance retraining, IADL retraining, passive ROM/stretching, occupation/activity based interventions, neuromuscular control/coordination retraining, manual therapy/joint mobilization, patient/caregiver education/training, ROM/therapeutic exercise, strengthening exercise, transfer/mobility retraining  Therapy Frequency (OT): 2 times/wk  Plan of Care Review  Plan of Care Reviewed With: patient  Progress: no change  Outcome Evaluation: OT eval completed. Pt fowlers in bed upon entering, lethargic, however agreeable to the session. Pt CGA for sup<>sit and sit<>sup with HOB elevated. Pt BUE 4-/5 grossly, except R shoulder flex 3+/5. Pt CGA for sit<>stand t/f and toilet t/f with RW. Pt CGA for fxnl mobility with RW. Pt demonstrates decreased fxnl mobility, strength, and independence in ADLs. Pt would benefit from skilled OT services. Recommend d/c home with assist, HHOT pending pt progress. Pt may also need a FWW.     Time Calculation:    Time Calculation- OT     Row Name 06/01/23 1430             Time Calculation-     OT Start Time 1430  -      OT Stop Time 1510  -      OT Time Calculation (min) 40 min  -      OT Received On 06/01/23  -      OT Goal Re-Cert Due Date 06/14/23  -         Untimed Charges    OT Eval/Re-eval Minutes 40  -         Total Minutes    Untimed Charges Total Minutes 40  -       Total Minutes 40   -            User Key  (r) = Recorded By, (t) = Taken By, (c) = Cosigned By    Initials Name Provider Type    Destiney King OT Occupational Therapist              Therapy Charges for Today     Code Description Service Date Service Provider Modifiers Qty    [id number] HC OT EVAL MOD COMPLEXITY 3 6/1/2023 Destiney Henriquez OT GO 1               Destiney Henriquez OT  6/1/2023

## 2023-06-01 NOTE — INTERVAL H&P NOTE
H&P reviewed. The patient was examined and there are no changes to the H&P.      Temp:  [97.7 °F (36.5 °C)] 97.7 °F (36.5 °C)  Heart Rate:  [84] 84  Resp:  [18] 18  BP: (127)/(82) 127/82

## 2023-06-01 NOTE — ANESTHESIA PROCEDURE NOTES
Airway  Urgency: elective    Date/Time: 6/1/2023 7:19 AM  End Time:6/1/2023 7:19 AM  Airway not difficult    General Information and Staff    Patient location during procedure: OR  CRNA/CAA: Tammie Shore CRNA  SRNA: Dahlia Hardwick SRNA  Indications and Patient Condition  Indications for airway management: airway protection    Preoxygenated: yes  MILS not maintained throughout  Mask difficulty assessment: 2 - vent by mask + OA or adjuvant +/- NMBA    Final Airway Details  Final airway type: endotracheal airway      Successful airway: ETT  Cuffed: yes   Successful intubation technique: direct laryngoscopy  Facilitating devices/methods: intubating stylet and cricoid pressure  Endotracheal tube insertion site: oral  Blade: Yonas  Blade size: 3  ETT size (mm): 7.0  Cormack-Lehane Classification: grade I - full view of glottis  Placement verified by: chest auscultation and capnometry   Cuff volume (mL): 8  Measured from: gums  ETT/EBT to gums (cm): 21  Number of attempts at approach: 1  Assessment: lips, teeth, and gum same as pre-op and atraumatic intubation

## 2023-06-01 NOTE — PLAN OF CARE
Goal Outcome Evaluation:  Plan of Care Reviewed With: patient        Progress: no change  Outcome Evaluation: OT eval completed. Pt fowlers in bed upon entering, lethargic, however agreeable to the session. Pt CGA for sup<>sit and sit<>sup with HOB elevated. Pt BUE 4-/5 grossly, except R shoulder flex 3+/5. Pt CGA for sit<>stand t/f and toilet t/f with RW. Pt CGA for fxnl mobility with RW. Pt demonstrates decreased fxnl mobility, strength, and independence in ADLs. Pt would benefit from skilled OT services. Recommend d/c home with assist, HHOT pending pt progress. Pt may also need a FWW.

## 2023-06-01 NOTE — ANESTHESIA POSTPROCEDURE EVALUATION
Patient: Jo Ann Lacy    Procedure Summary     Date: 06/01/23 Room / Location: [facility] OR 08 / [facility] OR    Anesthesia Start: 0708 Anesthesia Stop: 0950    Procedure: LAPAROSCOPIC ASSISTED RIGHT COLON RESECTION (Abdomen) Diagnosis:       Tubular adenoma of colon      (Tubular adenoma of colon [D12.6])    Surgeons: Last Miranda MD Provider: Tammie Shore CRNA    Anesthesia Type: general ASA Status: 3          Anesthesia Type: general    Vitals  Vitals Value Taken Time   /65 06/01/23 0950   Temp 97.9 °F (36.6 °C) 06/01/23 0950   Pulse 76 06/01/23 0950   Resp 14 06/01/23 0950   SpO2 100 % 06/01/23 0950           Post Anesthesia Care and Evaluation    Patient location during evaluation: PACU  Patient participation: waiting for patient participation  Level of consciousness: sleepy but conscious  Pain management: adequate    Airway patency: patent  Anesthetic complications: No anesthetic complications  PONV Status: none  Cardiovascular status: acceptable and hemodynamically stable  Respiratory status: acceptable, face mask and spontaneous ventilation  Hydration status: acceptable    Comments: ---------------------------               06/01/23                      0950         ---------------------------   BP:          113/65         Pulse:         76           Resp:          14           Temp:   97.9 °F (36.6 °C)   SpO2:         100%         ---------------------------

## 2023-06-02 LAB
ANION GAP SERPL CALCULATED.3IONS-SCNC: 10 MMOL/L (ref 5–15)
BASOPHILS # BLD AUTO: 0.03 10*3/MM3 (ref 0–0.2)
BASOPHILS NFR BLD AUTO: 0.3 % (ref 0–1.5)
BUN SERPL-MCNC: 6 MG/DL (ref 8–23)
BUN/CREAT SERPL: 11.3 (ref 7–25)
CALCIUM SPEC-SCNC: 9 MG/DL (ref 8.6–10.5)
CHLORIDE SERPL-SCNC: 104 MMOL/L (ref 98–107)
CO2 SERPL-SCNC: 23 MMOL/L (ref 22–29)
CREAT SERPL-MCNC: 0.53 MG/DL (ref 0.57–1)
DEPRECATED RDW RBC AUTO: 45.4 FL (ref 37–54)
EGFRCR SERPLBLD CKD-EPI 2021: 103.4 ML/MIN/1.73
EOSINOPHIL # BLD AUTO: 0 10*3/MM3 (ref 0–0.4)
EOSINOPHIL NFR BLD AUTO: 0 % (ref 0.3–6.2)
ERYTHROCYTE [DISTWIDTH] IN BLOOD BY AUTOMATED COUNT: 13.2 % (ref 12.3–15.4)
GLUCOSE SERPL-MCNC: 116 MG/DL (ref 65–99)
HCT VFR BLD AUTO: 30.4 % (ref 34–46.6)
HGB BLD-MCNC: 10.5 G/DL (ref 12–15.9)
IMM GRANULOCYTES # BLD AUTO: 0.04 10*3/MM3 (ref 0–0.05)
IMM GRANULOCYTES NFR BLD AUTO: 0.3 % (ref 0–0.5)
LYMPHOCYTES # BLD AUTO: 2 10*3/MM3 (ref 0.7–3.1)
LYMPHOCYTES NFR BLD AUTO: 17 % (ref 19.6–45.3)
MCH RBC QN AUTO: 32.3 PG (ref 26.6–33)
MCHC RBC AUTO-ENTMCNC: 34.5 G/DL (ref 31.5–35.7)
MCV RBC AUTO: 93.5 FL (ref 79–97)
MONOCYTES # BLD AUTO: 0.77 10*3/MM3 (ref 0.1–0.9)
MONOCYTES NFR BLD AUTO: 6.6 % (ref 5–12)
NEUTROPHILS NFR BLD AUTO: 75.8 % (ref 42.7–76)
NEUTROPHILS NFR BLD AUTO: 8.9 10*3/MM3 (ref 1.7–7)
NRBC BLD AUTO-RTO: 0 /100 WBC (ref 0–0.2)
PLATELET # BLD AUTO: 190 10*3/MM3 (ref 140–450)
PMV BLD AUTO: 9.6 FL (ref 6–12)
POTASSIUM SERPL-SCNC: 3.7 MMOL/L (ref 3.5–5.2)
RBC # BLD AUTO: 3.25 10*6/MM3 (ref 3.77–5.28)
SODIUM SERPL-SCNC: 137 MMOL/L (ref 136–145)
WBC NRBC COR # BLD: 11.74 10*3/MM3 (ref 3.4–10.8)

## 2023-06-02 PROCEDURE — 80048 BASIC METABOLIC PNL TOTAL CA: CPT | Performed by: SURGERY

## 2023-06-02 PROCEDURE — 97535 SELF CARE MNGMENT TRAINING: CPT

## 2023-06-02 PROCEDURE — 97530 THERAPEUTIC ACTIVITIES: CPT

## 2023-06-02 PROCEDURE — 25010000002 ENOXAPARIN PER 10 MG: Performed by: SURGERY

## 2023-06-02 PROCEDURE — 97110 THERAPEUTIC EXERCISES: CPT

## 2023-06-02 PROCEDURE — 97116 GAIT TRAINING THERAPY: CPT

## 2023-06-02 PROCEDURE — 85025 COMPLETE CBC W/AUTO DIFF WBC: CPT | Performed by: SURGERY

## 2023-06-02 RX ADMIN — GABAPENTIN 300 MG: 300 CAPSULE ORAL at 20:08

## 2023-06-02 RX ADMIN — FAMOTIDINE 20 MG: 20 TABLET ORAL at 09:46

## 2023-06-02 RX ADMIN — ACETAMINOPHEN 1000 MG: 500 TABLET, FILM COATED ORAL at 06:42

## 2023-06-02 RX ADMIN — ENOXAPARIN SODIUM 40 MG: 40 INJECTION SUBCUTANEOUS at 09:46

## 2023-06-02 RX ADMIN — OXYCODONE HYDROCHLORIDE 5 MG: 5 TABLET ORAL at 17:49

## 2023-06-02 RX ADMIN — FAMOTIDINE 20 MG: 20 TABLET ORAL at 20:08

## 2023-06-02 RX ADMIN — ALVIMOPAN 12 MG: 12 CAPSULE ORAL at 09:46

## 2023-06-02 RX ADMIN — GABAPENTIN 300 MG: 300 CAPSULE ORAL at 09:46

## 2023-06-02 RX ADMIN — ACETAMINOPHEN 1000 MG: 500 TABLET, FILM COATED ORAL at 20:08

## 2023-06-02 RX ADMIN — ALVIMOPAN 12 MG: 12 CAPSULE ORAL at 20:23

## 2023-06-02 RX ADMIN — ACETAMINOPHEN 1000 MG: 500 TABLET, FILM COATED ORAL at 01:19

## 2023-06-02 NOTE — THERAPY TREATMENT NOTE
Acute Care - Physical Therapy Treatment Note  [facility]     Patient Name: Jo Ann Lacy  : 1960  MRN: [id number]  Today's Date: 2023      Visit Dx:     ICD-10-CM ICD-9-CM   1. Tubular adenoma of colon  D12.6 211.3   2. Impaired functional mobility, balance, gait, and endurance  Z74.09 V49.89   3. Impaired mobility and ADLs  Z74.09 V49.89    Z78.9      Patient Active Problem List   Diagnosis   • Tubular adenoma of colon   • Villous adenoma of colon   • Benign polyp of large intestine   • Essential hypertension   • Vitamin D deficiency   • Hypertriglyceridemia   • Hypercholesteremia   • Acute bilateral knee pain   • Dyslipidemia   • Stress   • Anxiety   • History of colon polyps     Past Medical History:   Diagnosis Date   • Arthritis    • Benign polyp of large intestine    • Elevated cholesterol    • Essential hypertension    • Gastroesophageal reflux disease    • History of transfusion    • Hypertension    • Postmenopausal state    • Tubular adenoma of colon    • Villous adenoma of colon     1.6cm VTA with intermediate grade dysplasia transverse.  1.0cm VTA with intermediate grade dysplasia ascending   • Vitamin D deficiency      Past Surgical History:   Procedure Laterality Date   • COLONOSCOPY  2016    Many 4-5 mm polyps in the transverse colon and in the ascending colon.Resected and retrieved.External and internal hemorrhoids   • COLONOSCOPY N/A 03/10/2023    Procedure: COLONOSCOPY;  Surgeon: Last Miranda MD;  Location: [facility] ENDOSCOPY;  Service: Gastroenterology;  Laterality: N/A;   • ECTOPIC PREGNANCY SURGERY     • KNEE SURGERY Left    • TOTAL ABDOMINAL HYSTERECTOMY WITH SALPINGO OOPHORECTOMY       PT Assessment (last 12 hours)     PT Evaluation and Treatment     Row Name 23 1410 23 0914       Physical Therapy Time and Intention    Subjective Information complains of;pain  -TW no complaints  -TW    Document Type therapy note (daily note)  -TW therapy note (daily  note)  -TW    Mode of Treatment physical therapy;individual therapy  -TW physical therapy;individual therapy  -TW    Patient Effort good  -TW good  -TW    Row Name 06/02/23 1410 06/02/23 0914       General Information    Patient Profile Reviewed yes  -TW yes  -TW    Patient Observations alert;cooperative;agree to therapy  -TW alert;cooperative;agree to therapy  -TW    Patient/Family/Caregiver Comments/Observations none  -TW none  -TW    General Observations of Patient Pt sup in bed and agreeable to therapy.  -TW Pt sup in bed and agreeable to therapy.  -TW    Existing Precautions/Restrictions fall  -TW fall  -TW    Row Name 06/02/23 1410 06/02/23 0914       Pain    Pretreatment Pain Rating 2/10  -TW 2/10  -TW    Posttreatment Pain Rating 2/10  -TW 2/10  -TW    Pain Location - Side/Orientation Right  -TW --    Pain Location - flank  -TW abdomen  -TW    Row Name 06/02/23 1410 06/02/23 0914       Cognition    Affect/Mental Status (Cognition) WFL  -TW WFL  -TW    Orientation Status (Cognition) oriented x 4  -TW oriented x 4  -TW    Follows Commands (Cognition) WFL  -TW WFL  -TW    Personal Safety Interventions fall prevention program maintained;gait belt;nonskid shoes/slippers when out of bed  -TW fall prevention program maintained;nonskid shoes/slippers when out of bed  -TW    Row Name 06/02/23 1410 06/02/23 0914       Bed Mobility    Supine-Sit [place] (Bed Mobility) supervision  -TW supervision  -TW    Sit-Supine [place] (Bed Mobility) supervision  -TW supervision  -TW    Assistive Device (Bed Mobility) head of bed elevated  -TW head of bed elevated  -TW    Row Name 06/02/23 1410 06/02/23 0914       Sit-Stand Transfer    Sit-Stand [place] (Transfers) contact guard  -TW contact guard  -TW    Assistive Device (Sit-Stand Transfers) other (see comments)  No AD used but pt held to wall railing when available.  -TW other (see comments)  No AD used but pt held to wall railing when available.  -TW    Row Name  06/02/23 1410 06/02/23 0914       Stand-Sit Transfer    Stand-Sit [place] (Transfers) contact guard  -TW contact guard  -TW    Assistive Device (Stand-Sit Transfers) other (see comments)  No AD used but pt held to wall railing when available.  -TW other (see comments)  No AD used but pt held to wall railing when available.  -TW    Row Name 06/02/23 1410 06/02/23 0914       Gait/Stairs (Locomotion)    [place] Level (Gait) contact guard  -TW contact guard  -TW    Assistive Device (Gait) other (see comments)  No AD used but pt held to wall railing when available.  -TW other (see comments)  No AD used but pt held to wall railing when available.  -TW    Distance in Feet (Gait) 124ft and 328ft  -TW 84ft, 72ft and 88ft  -TW    Comment, (Gait/Stairs) Pt desatted to 82% during gait but able to recover with 2 minute seated rest and PLB cues.  -TW --    Row Name             Wound 06/01/23 0756 abdomen Incision    Wound - Properties Group Placement Date: 06/01/23  - Placement Time: 0756  - Location: abdomen  -HC Primary Wound Type: Incision  -HC    Retired Wound - Properties Group Placement Date: 06/01/23  - Placement Time: 0756  -HC Location: abdomen  -HC Primary Wound Type: Incision  -HC    Retired Wound - Properties Group Date first assessed: 06/01/23  - Time first assessed: 0756  -HC Location: abdomen  -HC Primary Wound Type: Incision  -HC    Row Name 06/02/23 1410 06/02/23 0914       Plan of Care Review    Plan of Care Reviewed With patient  -TW patient  -TW    Progress improving  -TW improving  -TW    Outcome Evaluation Pt sup in bed and agreeable to therapy. Pt cont to display good ability to t/f and amb with no AD but did desat this pm during tx to 82% from 93% initially. Pt returned to room and back to sup due to wanting to take a nap prior to supper. Pt with good O2 levels at end of tx and instructed on proper tech of using insentive spirometor. Pt demonstrated good understanding. Pt left in sup with  all needs met. Pt would cont to benefit from therapy at this time.  -TW Pt sup in bed and agreeable to therapy. Pt t/f sup to sit with spv. Pt stood with CGA and amb without AD but holding to wall railing when available for 84ft, 72ft and 88ft with seated rest in between due to SOA and LE fatigue. Pt able to maintain good O2 level throughout gait trials and returned to her room and t/f to sup without issue. Pt would cont to benefit from therapy at this time.  -TW    Row Name 06/02/23 1410 06/02/23 0914       Vital Signs    Pretreatment Heart Rate (beats/min) 88  -TW 84  -TW    Intratreatment Heart Rate (beats/min) 98  -TW --    Posttreatment Heart Rate (beats/min) 90  -TW 82  -TW    Pre SpO2 (%) 93  -TW 97  -TW    O2 Delivery Pre Treatment room air  -TW room air  -TW    Intra SpO2 (%) 82  -TW --    O2 Delivery Intra Treatment room air  -TW --    Post SpO2 (%) 98  -TW 98  -TW    O2 Delivery Post Treatment room air  -TW room air  -TW    Pre Patient Position Supine  -TW Supine  -TW    Intra Patient Position Standing  -TW Standing  -TW    Post Patient Position Supine  -TW Supine  -TW    Row Name 06/02/23 1410 06/02/23 0914       Positioning and Restraints    Pre-Treatment Position in bed  -TW in bed  -TW    Post Treatment Position bed  -TW bed  -TW    In Bed supine;call light within reach;encouraged to call for assist;exit alarm on  -TW supine;call light within reach;encouraged to call for assist;exit alarm on  -TW    Row Name 06/02/23 1410 06/02/23 0914       Therapy Assessment/Plan (PT)    Rehab Potential (PT) good, to achieve stated therapy goals  -TW good, to achieve stated therapy goals  -TW    Row Name 06/02/23 1410 06/02/23 0914       Bed Mobility Goal 1 (PT)    Activity/Assistive Device (Bed Mobility Goal 1, PT) bed mobility activities, all  -TW bed mobility activities, all  -TW    [place] Level/Cues Needed (Bed Mobility Goal 1, PT) modified independence  -TW modified independence  -TW    Time Frame (Bed  Mobility Goal 1, PT) by discharge  -TW by discharge  -TW    Progress/Outcomes (Bed Mobility Goal 1, PT) goal not met  -TW goal not met  -TW    Row Name 06/02/23 1410 06/02/23 0914       Transfer Goal 1 (PT)    Activity/Assistive Device (Transfer Goal 1, PT) sit-to-stand/stand-to-sit;bed-to-chair/chair-to-bed  -TW sit-to-stand/stand-to-sit;bed-to-chair/chair-to-bed  -TW    [place] Level/Cues Needed (Transfer Goal 1, PT) modified independence  -TW modified independence  -TW    Time Frame (Transfer Goal 1, PT) by discharge  -TW by discharge  -TW    Progress/Outcome (Transfer Goal 1, PT) goal not met  -TW goal not met  -TW    Row Name 06/02/23 1410 06/02/23 0914       Gait Training Goal 1 (PT)    Activity/Assistive Device (Gait Training Goal 1, PT) gait (walking locomotion);increase endurance/gait distance  -TW gait (walking locomotion);increase endurance/gait distance  -TW    [place] Level (Gait Training Goal 1, PT) modified independence  -TW modified independence  -TW    Distance (Gait Training Goal 1, PT) 25' x 2  -TW 25' x 2  -TW    Time Frame (Gait Training Goal 1, PT) by discharge  -TW by discharge  -TW    Progress/Outcome (Gait Training Goal 1, PT) goal not met  -TW goal not met  -TW    Row Name 06/02/23 1410 06/02/23 0914       Problem Specific Goal 1 (PT)    Problem Specific Goal 1 (PT) Improve LE strength to 4/5  -TW Improve LE strength to 4/5  -TW    Time Frame (Problem Specific Goal 1, PT) by discharge  -TW by discharge  -TW    Progress/Outcome (Problem Specific Goal 1, PT) goal not met  -TW goal not met  -TW          User Key  (r) = Recorded By, (t) = Taken By, (c) = Cosigned By    Initials Name Provider Type    TW Sim Hernandez PTA Physical Therapist Assistant    Jeanne Tam RN Registered Nurse                Physical Therapy Education     Title: PT OT SLP Therapies (In Progress)     Topic: Physical Therapy (In Progress)     Point: Mobility training (Done)     Learning Progress  Summary           Patient Acceptance, E, VU,NR by  at 6/1/2023 1308    Comment: POC and goals                   Point: Home exercise program (Not Started)     Learner Progress:  Not documented in this visit.          Point: Body mechanics (Not Started)     Learner Progress:  Not documented in this visit.          Point: Precautions (Not Started)     Learner Progress:  Not documented in this visit.                      User Key     Initials Effective Dates Name Provider Type Discipline     01/09/23 -  Jeanne Dotson, PT Physical Therapist PT              PT Recommendation and Plan  Anticipated Discharge Disposition (PT): home with 24/7 care, home with home health, LT[facility] (long-term care hospital), skilled nursing facility  Plan of Care Reviewed With: patient  Progress: improving  Outcome Evaluation: Pt sup in bed and agreeable to therapy. Pt cont to display good ability to t/f and amb with no AD but did desat this pm during tx to 82% from 93% initially. Pt returned to room and back to sup due to wanting to take a nap prior to supper. Pt with good O2 levels at end of tx and instructed on proper tech of using insentive spirometor. Pt demonstrated good understanding. Pt left in sup with all needs met. Pt would cont to benefit from therapy at this time.       Time Calculation:    PT Charges     Row Name 06/02/23 1523 06/02/23 1310          Time Calculation    Start Time 1410  -TW 0914  -TW     Stop Time 1450  -TW 0946  -TW     Time Calculation (min) 40 min  -TW 32 min  -TW        Time Calculation- PT    Total Timed Code Minutes- PT 40 minute(s)  -TW 32 minute(s)  -TW           User Key  (r) = Recorded By, (t) = Taken By, (c) = Cosigned By    Initials Name Provider Type     Sim Hernandez PTA Physical Therapist Assistant              Therapy Charges for Today     Code Description Service Date Service Provider Modifiers Qty    00676210009 HC GAIT TRAINING EA 15 MIN 6/2/2023 Sim Hernandez PTA GP 1     22911112736 HC PT THERAPEUTIC ACT EA 15 MIN 6/2/2023 Sim Hernandez, BENI GP 1    81662194472 HC GAIT TRAINING EA 15 MIN 6/2/2023 Sim Hernandez, BENI GP 2    69820221030 HC PT THERAPEUTIC ACT EA 15 MIN 6/2/2023 Sim Hernandez, BENI GP 1          PT G-Codes  Outcome Measure Options: AM-PAC 6 Clicks Daily Activity (OT)  AM-PAC 6 Clicks Score (PT): 20  AM-PAC 6 Clicks Score (OT): 22    Sim Hernandez PTA  6/2/2023

## 2023-06-02 NOTE — PROGRESS NOTES
"  Subjective:  Postop day #1 laparoscopic right colon resection for adenomatous polyp.  Patient doing well.  Patient is on ERS protocol.  Gauthier removed this morning.  No BM no flatus pain well controlled.     /67 (BP Location: Left arm, Patient Position: Lying)   Pulse 86   Temp 98.6 °F (37 °C) (Temporal)   Resp 18   Ht 160 cm (63\")   Wt 58.1 kg (128 lb 1.6 oz)   LMP 07/10/2017   SpO2 92%   BMI 22.69 kg/m²     Lab Results (last 24 hours)     Procedure Component Value Units Date/Time    Basic Metabolic Panel [[id number]]  (Abnormal) Collected: 06/02/23 0528    Specimen: Blood Updated: 06/02/23 0601     Glucose 116 mg/dL      BUN 6 mg/dL      Creatinine 0.53 mg/dL      Sodium 137 mmol/L      Potassium 3.7 mmol/L      Chloride 104 mmol/L      CO2 23.0 mmol/L      Calcium 9.0 mg/dL      BUN/Creatinine Ratio 11.3     Anion Gap 10.0 mmol/L      eGFR 103.4 mL/min/1.73     Narrative:      GFR Normal >60  Chronic Kidney Disease <60  Kidney Failure <15      CBC & Differential [[id number]]  (Abnormal) Collected: 06/02/23 0528    Specimen: Blood Updated: 06/02/23 0546    Narrative:      The following orders were created for panel order CBC & Differential.  Procedure                               Abnormality         Status                     ---------                               -----------         ------                     CBC Auto Differential[[id number]]        Abnormal            Final result                 Please view results for these tests on the individual orders.    CBC Auto Differential [[id number]]  (Abnormal) Collected: 06/02/23 0528    Specimen: Blood Updated: 06/02/23 0546     WBC 11.74 10*3/mm3      RBC 3.25 10*6/mm3      Hemoglobin 10.5 g/dL      Hematocrit 30.4 %      MCV 93.5 fL      MCH 32.3 pg      MCHC 34.5 g/dL      RDW 13.2 %      RDW-SD 45.4 fl      MPV 9.6 fL      Platelets 190 10*3/mm3      Neutrophil % 75.8 %      Lymphocyte % 17.0 %      Monocyte % 6.6 %      Eosinophil % 0.0 %      " Basophil % 0.3 %      Immature Grans % 0.3 %      Neutrophils, Absolute 8.90 10*3/mm3      Lymphocytes, Absolute 2.00 10*3/mm3      Monocytes, Absolute 0.77 10*3/mm3      Eosinophils, Absolute 0.00 10*3/mm3      Basophils, Absolute 0.03 10*3/mm3      Immature Grans, Absolute 0.04 10*3/mm3      nRBC 0.0 /100 WBC     TISSUE EXAM, [facility] ([facility]) [[id number]] Collected: 06/01/23 0756    Specimen: Tissue from Large Intestine, Right / Ascending Colon Updated: 06/01/23 1011          Current Medications:  Current Facility-Administered Medications   Medication Dose Route Frequency Provider Last Rate Last Admin   • acetaminophen (TYLENOL) tablet 1,000 mg  1,000 mg Oral Q6H Last Miranda MD   1,000 mg at 06/02/23 0642   • alvimopan (ENTEREG) capsule 12 mg  12 mg Oral BID Last Miranda MD       • dextrose 5 % and sodium chloride 0.45 % with KCl 20 mEq/L infusion  100 mL/hr Intravenous Continuous Last Miranda  mL/hr at 06/01/23 2140 100 mL/hr at 06/01/23 2140   • Enoxaparin Sodium (LOVENOX) syringe 40 mg  40 mg Subcutaneous Daily Last Miranda MD       • famotidine (PEPCID) tablet 20 mg  20 mg Oral BID Last Miranda MD   20 mg at 06/01/23 2131   • gabapentin (NEURONTIN) capsule 300 mg  300 mg Oral TID Last Miranda MD   300 mg at 06/01/23 2131   • HYDROmorphone (DILAUDID) injection 0.5 mg  0.5 mg Intravenous Q2H PRN Last Miranda MD        And   • naloxone (NARCAN) injection 0.4 mg  0.4 mg Intravenous Q5 Min PRN Last Miranda MD       • ondansetron (ZOFRAN) injection 4 mg  4 mg Intravenous Q6H PRN Last Miranda MD       • oxyCODONE (ROXICODONE) immediate release tablet 5 mg  5 mg Oral Q4H PRN Last Miranda MD   5 mg at 06/01/23 2134       Prior to admission medications:  Medications Prior to Admission   Medication Sig Dispense Refill Last Dose   • amLODIPine (NORVASC) 5 MG tablet Take 1 tablet by mouth Daily. 90 tablet 1 6/1/2023 at 0400   • neomycin (MYCIFRADIN)  500 MG tablet Take 1 tablet by mouth See Admin Instructions. Take two (2) tablets at 7 PM and two (2) tablets at 11 PM night prior to surgery. 4 tablet 0 6/1/2023 at 0030   • omeprazole (priLOSEC) 20 MG capsule Take 1 capsule by mouth Daily As Needed.   Past Month       Physical exam: Alert and appropriate  Sitting up moving around  Nontoxic  Abdomen soft dressing wound Qiana intact      Assessment : Doing well postop right colon resection    Plan: Continue with ERAS protocol

## 2023-06-02 NOTE — PLAN OF CARE
Goal Outcome Evaluation:  Plan of Care Reviewed With: patient        Progress: improving  Outcome Evaluation: nursing ok'd OT pt agreeable, sup<>sit sba, sit<>stand sba, amb in [place] ~ 20 ft fww sba, toilet transfer sba toileting sba, pt working well w/ OT cont poc

## 2023-06-02 NOTE — THERAPY TREATMENT NOTE
Acute Care - Physical Therapy Treatment Note  [facility]     Patient Name: Jo Ann Lacy  : 1960  MRN: [id number]  Today's Date: 2023      Visit Dx:     ICD-10-CM ICD-9-CM   1. Tubular adenoma of colon  D12.6 211.3   2. Impaired functional mobility, balance, gait, and endurance  Z74.09 V49.89   3. Impaired mobility and ADLs  Z74.09 V49.89    Z78.9      Patient Active Problem List   Diagnosis   • Tubular adenoma of colon   • Villous adenoma of colon   • Benign polyp of large intestine   • Essential hypertension   • Vitamin D deficiency   • Hypertriglyceridemia   • Hypercholesteremia   • Acute bilateral knee pain   • Dyslipidemia   • Stress   • Anxiety   • History of colon polyps     Past Medical History:   Diagnosis Date   • Arthritis    • Benign polyp of large intestine    • Elevated cholesterol    • Essential hypertension    • Gastroesophageal reflux disease    • History of transfusion    • Hypertension    • Postmenopausal state    • Tubular adenoma of colon    • Villous adenoma of colon     1.6cm VTA with intermediate grade dysplasia transverse.  1.0cm VTA with intermediate grade dysplasia ascending   • Vitamin D deficiency      Past Surgical History:   Procedure Laterality Date   • COLONOSCOPY  2016    Many 4-5 mm polyps in the transverse colon and in the ascending colon.Resected and retrieved.External and internal hemorrhoids   • COLONOSCOPY N/A 03/10/2023    Procedure: COLONOSCOPY;  Surgeon: Last Miranda MD;  Location: [facility] ENDOSCOPY;  Service: Gastroenterology;  Laterality: N/A;   • ECTOPIC PREGNANCY SURGERY     • KNEE SURGERY Left    • TOTAL ABDOMINAL HYSTERECTOMY WITH SALPINGO OOPHORECTOMY       PT Assessment (last 12 hours)     PT Evaluation and Treatment     Row Name 23 0914          Physical Therapy Time and Intention    Subjective Information no complaints  -TW     Document Type therapy note (daily note)  -TW     Mode of Treatment physical therapy;individual  therapy  -TW     Patient Effort good  -TW     Row Name 06/02/23 0914          General Information    Patient Profile Reviewed yes  -TW     Patient Observations alert;cooperative;agree to therapy  -TW     Patient/Family/Caregiver Comments/Observations none  -TW     General Observations of Patient Pt sup in bed and agreeable to therapy.  -TW     Existing Precautions/Restrictions fall  -TW     Row Name 06/02/23 0914          Pain    Pretreatment Pain Rating 2/10  -TW     Posttreatment Pain Rating 2/10  -TW     Pain Location - abdomen  -TW     Row Name 06/02/23 0914          Cognition    Affect/Mental Status (Cognition) WFL  -TW     Orientation Status (Cognition) oriented x 4  -TW     Follows Commands (Cognition) WFL  -TW     Personal Safety Interventions fall prevention program maintained;nonskid shoes/slippers when out of bed  -TW     Row Name 06/02/23 0914          Bed Mobility    Supine-Sit [place] (Bed Mobility) supervision  -TW     Sit-Supine [place] (Bed Mobility) supervision  -TW     Assistive Device (Bed Mobility) head of bed elevated  -TW     Row Name 06/02/23 0914          Sit-Stand Transfer    Sit-Stand [place] (Transfers) contact guard  -TW     Assistive Device (Sit-Stand Transfers) other (see comments)  No AD used but pt held to wall railing when available.  -TW     Row Name 06/02/23 0914          Stand-Sit Transfer    Stand-Sit [place] (Transfers) contact guard  -TW     Assistive Device (Stand-Sit Transfers) other (see comments)  No AD used but pt held to wall railing when available.  -TW     Row Name 06/02/23 0914          Gait/Stairs (Locomotion)    [place] Level (Gait) contact guard  -TW     Assistive Device (Gait) other (see comments)  No AD used but pt held to wall railing when available.  -TW     Distance in Feet (Gait) 84ft, 72ft and 88ft  -TW     Row Name             Wound 06/01/23 0756 abdomen Incision    Wound - Properties Group Placement Date: 06/01/23  -HC Placement  Time: 0756 -HC Location: abdomen  -HC Primary Wound Type: Incision  -HC    Retired Wound - Properties Group Placement Date: 06/01/23  - Placement Time: 0756 -HC Location: abdomen  -HC Primary Wound Type: Incision  -HC    Retired Wound - Properties Group Date first assessed: 06/01/23  - Time first assessed: 0756 - Location: abdomen  -HC Primary Wound Type: Incision  -HC    Row Name 06/02/23 0914          Plan of Care Review    Plan of Care Reviewed With patient  -TW     Progress improving  -TW     Outcome Evaluation Pt sup in bed and agreeable to therapy. Pt t/f sup to sit with spv. Pt stood with CGA and amb without AD but holding to wall railing when available for 84ft, 72ft and 88ft with seated rest in between due to SOA and LE fatigue. Pt able to maintain good O2 level throughout gait trials and returned to her room and t/f to sup without issue. Pt would cont to benefit from therapy at this time.  -TW     Row Name 06/02/23 0914          Vital Signs    Pretreatment Heart Rate (beats/min) 84  -TW     Posttreatment Heart Rate (beats/min) 82  -TW     Pre SpO2 (%) 97  -TW     O2 Delivery Pre Treatment room air  -TW     Post SpO2 (%) 98  -TW     O2 Delivery Post Treatment room air  -TW     Pre Patient Position Supine  -TW     Intra Patient Position Standing  -TW     Post Patient Position Supine  -TW     Row Name 06/02/23 0914          Positioning and Restraints    Pre-Treatment Position in bed  -TW     Post Treatment Position bed  -TW     In Bed supine;call light within reach;encouraged to call for assist;exit alarm on  -TW     Row Name 06/02/23 0914          Therapy Assessment/Plan (PT)    Rehab Potential (PT) good, to achieve stated therapy goals  -TW     Row Name 06/02/23 0914          Bed Mobility Goal 1 (PT)    Activity/Assistive Device (Bed Mobility Goal 1, PT) bed mobility activities, all  -TW     [place] Level/Cues Needed (Bed Mobility Goal 1, PT) modified independence  -TW     Time Frame (Bed  Mobility Goal 1, PT) by discharge  -TW     Progress/Outcomes (Bed Mobility Goal 1, PT) goal not met  -TW     Row Name 06/02/23 0914          Transfer Goal 1 (PT)    Activity/Assistive Device (Transfer Goal 1, PT) sit-to-stand/stand-to-sit;bed-to-chair/chair-to-bed  -TW     [place] Level/Cues Needed (Transfer Goal 1, PT) modified independence  -TW     Time Frame (Transfer Goal 1, PT) by discharge  -TW     Progress/Outcome (Transfer Goal 1, PT) goal not met  -TW     Row Name 06/02/23 0914          Gait Training Goal 1 (PT)    Activity/Assistive Device (Gait Training Goal 1, PT) gait (walking locomotion);increase endurance/gait distance  -TW     [place] Level (Gait Training Goal 1, PT) modified independence  -TW     Distance (Gait Training Goal 1, PT) 25' x 2  -TW     Time Frame (Gait Training Goal 1, PT) by discharge  -TW     Progress/Outcome (Gait Training Goal 1, PT) goal not met  -TW     Row Name 06/02/23 0914          Problem Specific Goal 1 (PT)    Problem Specific Goal 1 (PT) Improve LE strength to 4/5  -TW     Time Frame (Problem Specific Goal 1, PT) by discharge  -TW     Progress/Outcome (Problem Specific Goal 1, PT) goal not met  -TW           User Key  (r) = Recorded By, (t) = Taken By, (c) = Cosigned By    Initials Name Provider Type    TW Sim Hernandez, PTA Physical Therapist Assistant    Jeanne Tam RN Registered Nurse                Physical Therapy Education     Title: PT OT SLP Therapies (In Progress)     Topic: Physical Therapy (In Progress)     Point: Mobility training (Done)     Learning Progress Summary           Patient Acceptance, E, VU,NR by  at 6/1/2023 1308    Comment: POC and goals                   Point: Home exercise program (Not Started)     Learner Progress:  Not documented in this visit.          Point: Body mechanics (Not Started)     Learner Progress:  Not documented in this visit.          Point: Precautions (Not Started)     Learner Progress:  Not documented  in this visit.                      User Key     Initials Effective Dates Name Provider Type Discipline     01/09/23 -  Jeanne Dotson, PT Physical Therapist PT              PT Recommendation and Plan  Anticipated Discharge Disposition (PT): home with 24/7 care, home with home health, LTCH (long-term care hospital), skilled nursing facility  Plan of Care Reviewed With: patient  Progress: improving  Outcome Evaluation: Pt sup in bed and agreeable to therapy. Pt t/f sup to sit with spv. Pt stood with CGA and amb without AD but holding to wall railing when available for 84ft, 72ft and 88ft with seated rest in between due to SOA and LE fatigue. Pt able to maintain good O2 level throughout gait trials and returned to her room and t/f to sup without issue. Pt would cont to benefit from therapy at this time.       Time Calculation:    PT Charges     Row Name 06/02/23 1310             Time Calculation    Start Time 0914  -TW      Stop Time 0946  -TW      Time Calculation (min) 32 min  -TW         Time Calculation- PT    Total Timed Code Minutes- PT 32 minute(s)  -TW            User Key  (r) = Recorded By, (t) = Taken By, (c) = Cosigned By    Initials Name Provider Type    TW Sim Hernandez PTA Physical Therapist Assistant              Therapy Charges for Today     Code Description Service Date Service Provider Modifiers Qty    10050930027 HC GAIT TRAINING EA 15 MIN 6/2/2023 Sim Hernandez PTA GP 1    08707183607 HC PT THERAPEUTIC ACT EA 15 MIN 6/2/2023 Sim Hernandez PTA GP 1          PT G-Codes  Outcome Measure Options: AM-PAC 6 Clicks Daily Activity (OT)  AM-PAC 6 Clicks Score (PT): 20  AM-PAC 6 Clicks Score (OT): 22    Sim Hernandez PTA  6/2/2023

## 2023-06-02 NOTE — THERAPY TREATMENT NOTE
Patient Name: Jo Ann Lacy  : 1960    MRN: [id number]                              Today's Date: 2023       Admit Date: 2023    Visit Dx:     ICD-10-CM ICD-9-CM   1. Tubular adenoma of colon  D12.6 211.3   2. Impaired functional mobility, balance, gait, and endurance  Z74.09 V49.89   3. Impaired mobility and ADLs  Z74.09 V49.89    Z78.9      Patient Active Problem List   Diagnosis   • Tubular adenoma of colon   • Villous adenoma of colon   • Benign polyp of large intestine   • Essential hypertension   • Vitamin D deficiency   • Hypertriglyceridemia   • Hypercholesteremia   • Acute bilateral knee pain   • Dyslipidemia   • Stress   • Anxiety   • History of colon polyps     Past Medical History:   Diagnosis Date   • Arthritis    • Benign polyp of large intestine    • Elevated cholesterol    • Essential hypertension    • Gastroesophageal reflux disease    • History of transfusion    • Hypertension    • Postmenopausal state    • Tubular adenoma of colon    • Villous adenoma of colon     1.6cm VTA with intermediate grade dysplasia transverse.  1.0cm VTA with intermediate grade dysplasia ascending   • Vitamin D deficiency      Past Surgical History:   Procedure Laterality Date   • COLONOSCOPY  2016    Many 4-5 mm polyps in the transverse colon and in the ascending colon.Resected and retrieved.External and internal hemorrhoids   • COLONOSCOPY N/A 03/10/2023    Procedure: COLONOSCOPY;  Surgeon: Last Miranda MD;  Location: [facility] ENDOSCOPY;  Service: Gastroenterology;  Laterality: N/A;   • ECTOPIC PREGNANCY SURGERY     • KNEE SURGERY Left    • TOTAL ABDOMINAL HYSTERECTOMY WITH SALPINGO OOPHORECTOMY        General Information     Row Name 23 1118 23 0805       OT Time and Intention    Document Type therapy note (daily note)  -RC therapy note (daily note)  -RC    Mode of Treatment individual therapy;occupational therapy  -RC individual therapy;occupational therapy  -RC    Row Name  06/02/23 1118 06/02/23 0805       General Information    Patient Profile Reviewed yes  -RC yes  -RC    Existing Precautions/Restrictions fall  -RC fall  -RC    Row Name 06/02/23 1118 06/02/23 0805       Cognition    Orientation Status (Cognition) oriented x 4  -RC oriented x 4  -RC    Row Name 06/02/23 1118 06/02/23 0805       Safety Issues, Functional Mobility    Impairments Affecting Function (Mobility) endurance/activity tolerance;pain;strength  -RC endurance/activity tolerance;pain;strength  -RC          User Key  (r) = Recorded By, (t) = Taken By, (c) = Cosigned By    Initials Name Provider Type    RC Amber Abdalla COTA Occupational Therapist Assistant                 Mobility/ADL's    No documentation.                Obj/Interventions    No documentation.                Goals/Plan     Row Name 06/02/23 1118 06/02/23 0805       Transfer Goal 1 (OT)    Activity/Assistive Device (Transfer Goal 1, OT) transfers, all  -RC transfers, all  -RC    [place] Level/Cues Needed (Transfer Goal 1, OT) modified independence  -RC modified independence  -RC    Time Frame (Transfer Goal 1, OT) long term goal (LTG);by discharge  -RC long term goal (LTG);by discharge  -RC    Progress/Outcome (Transfer Goal 1, OT) goal not met  -RC goal not met  -RC    Row Name 06/02/23 1118 06/02/23 0805       Dressing Goal 1 (OT)    Activity/Device (Dressing Goal 1, OT) dressing skills, all  -RC dressing skills, all  -RC    [place]/Cues Needed (Dressing Goal 1, OT) independent  -RC independent  -RC    Time Frame (Dressing Goal 1, OT) long term goal (LTG);by discharge  -RC long term goal (LTG);by discharge  -RC    Progress/Outcome (Dressing Goal 1, OT) goal not met  -RC goal not met  -RC    Row Name 06/02/23 1118 06/02/23 0805       Toileting Goal 1 (OT)    Activity/Device (Toileting Goal 1, OT) toileting skills, all  -RC toileting skills, all  -RC    [place] Level/Cues Needed (Toileting Goal 1, OT) independent  -RC independent   -RC    Time Frame (Toileting Goal 1, OT) long term goal (LTG);by discharge  -RC long term goal (LTG);by discharge  -RC    Progress/Outcome (Toileting Goal 1, OT) goal not met  -RC goal not met  -RC    Row Name 06/02/23 1118 06/02/23 0805       Problem Specific Goal 1 (OT)    Problem Specific Goal 1 (OT) Pt will tolerate participating in OOB fxnl activities for ~25 minutes to improve activity tolerance.  -RC Pt will tolerate participating in OOB fxnl activities for ~25 minutes to improve activity tolerance.  -RC    Time Frame (Problem Specific Goal 1, OT) long term goal (LTG);by discharge  -RC long term goal (LTG);by discharge  -RC    Progress/Outcome (Problem Specific Goal 1, OT) goal not met  -RC goal not met  -RC          User Key  (r) = Recorded By, (t) = Taken By, (c) = Cosigned By    Initials Name Provider Type    Amber Villalta COTA Occupational Therapist Assistant               Clinical Impression     Row Name 06/02/23 [facility]8 06/02/23 0805       Pain Assessment    Pretreatment Pain Rating 3/10  -RC 0/10 - no pain  -RC    Posttreatment Pain Rating -- 0/10 - no pain  -RC    Pain Location upper  -RC --    Pain Location - abdomen  -RC --    Row Name 06/02/23 1118 06/02/23 0805       Therapy Assessment/Plan (OT)    Rehab Potential (OT) good, to achieve stated therapy goals  -RC good, to achieve stated therapy goals  -RC    Criteria for Skilled Therapeutic Interventions Met (OT) yes;meets criteria;skilled treatment is necessary  -RC yes;meets criteria;skilled treatment is necessary  -RC    Therapy Frequency (OT) 2 times/wk  -RC 2 times/wk  -RC    Row Name 06/02/23 1118 06/02/23 0805       Therapy Plan Review/Discharge Plan (OT)    Anticipated Discharge Disposition (OT) home with assist;home with home health  -RC home with assist;home with home health  -RC          User Key  (r) = Recorded By, (t) = Taken By, (c) = Cosigned By    Initials Name Provider Type    Amber Villalta COTA Occupational Therapist  Assistant               Outcome Measures     Row Name 06/02/23 1118 06/02/23 0805       How much help from another is currently needed...    Putting on and taking off regular lower body clothing? 3  -RC 3  -RC    Bathing (including washing, rinsing, and drying) 3  -RC 3  -RC    Toileting (which includes using toilet bed pan or urinal) 4  -RC 4  -RC    Putting on and taking off regular upper body clothing 4  -RC 4  -RC    Taking care of personal grooming (such as brushing teeth) 4  -RC 4  -RC    Eating meals 4  -RC 4  -RC    AM-PAC 6 Clicks Score (OT) 22  -RC 22  -RC    Row Name 06/02/23 1047          How much help from another person do you currently need...    Turning from your back to your side while in flat bed without using bedrails? 4  -LR     Moving from lying on back to sitting on the side of a flat bed without bedrails? 4  -LR     Moving to and from a bed to a chair (including a wheelchair)? 3  -LR     Standing up from a chair using your arms (e.g., wheelchair, bedside chair)? 3  -LR     Climbing 3-5 steps with a railing? 3  -LR     To walk in hospital room? 3  -LR     AM-PAC 6 Clicks Score (PT) 20  -LR     Highest level of mobility 6 --> Walked 10 steps or more  -LR     Row Name 06/02/23 1118 06/02/23 0805       Functional Assessment    Outcome Measure Options AM-PAC 6 Clicks Daily Activity (OT)  -RC AM-PAC 6 Clicks Daily Activity (OT)  -RC          User Key  (r) = Recorded By, (t) = Taken By, (c) = Cosigned By    Initials Name Provider Type    LR Dana Diaz RN Registered Nurse    Amber Villalta COTA Occupational Therapist Assistant                Occupational Therapy Education     Title: PT OT SLP Therapies (In Progress)     Topic: Occupational Therapy (In Progress)     Point: ADL training (Done)     Description:   Instruct learner(s) on proper safety adaptation and remediation techniques during self care or transfers.   Instruct in proper use of assistive devices.              Learning  Progress Summary           Patient Acceptance, E, VU,NR by  at 6/2/2023 1159    Acceptance, E, VU,NR by  at 6/2/2023 1109    Acceptance, E,TB, VU by  at 6/1/2023 1531    Comment: OT role, POC, t/f training                   Point: Home exercise program (Not Started)     Description:   Instruct learner(s) on appropriate technique for monitoring, assisting and/or progressing therapeutic exercises/activities.              Learner Progress:  Not documented in this visit.          Point: Precautions (Done)     Description:   Instruct learner(s) on prescribed precautions during self-care and functional transfers.              Learning Progress Summary           Patient Acceptance, E, VU,NR by  at 6/2/2023 1159    Acceptance, E, VU,NR by  at 6/2/2023 1109    Acceptance, E,TB, VU by  at 6/1/2023 1531    Comment: OT role, POC, t/f training                   Point: Body mechanics (Done)     Description:   Instruct learner(s) on proper positioning and spine alignment during self-care, functional mobility activities and/or exercises.              Learning Progress Summary           Patient Acceptance, E, VU,NR by  at 6/2/2023 1159    Acceptance, E, VU,NR by  at 6/2/2023 1109    Acceptance, E,TB, VU by  at 6/1/2023 1531    Comment: OT role, POC, t/f training                               User Key     Initials Effective Dates Name Provider Type Discipline     06/16/21 -  Amber Abdalla COTA Occupational Therapist Assistant OT     10/19/22 -  Destiney Henriquez OT Occupational Therapist OT              OT Recommendation and Plan  Therapy Frequency (OT): 2 times/wk  Plan of Care Review  Plan of Care Reviewed With: patient  Progress: improving  Outcome Evaluation: pt defers oob or amb at this time having increased pain in abd. reports just back to bed from bathroom  participated in arom ex b ue bed mobility and homesafety edu. cont poc     Time Calculation:    Time Calculation- OT     Row Name 06/02/23 1136  06/02/23 0834          Time Calculation- OT    OT Start Time 1118  -RC 0805  -RC     OT Stop Time 1147  -RC 0845  -RC     OT Time Calculation (min) 29 min  -RC 40 min  -RC     Total Timed Code Minutes- OT 29 minute(s)  -RC 40 minute(s)  -RC     OT Received On 06/02/23  -RC 06/02/23  -RC        Timed Charges    34642 - OT Therapeutic Exercise Minutes 19  -RC 15  -RC     44037 - OT Therapeutic Activity Minutes -- 15  -RC     87366 - OT Self Care/Mgmt Minutes 10  -RC 10  -RC        Total Minutes    Timed Charges Total Minutes 29  -RC 40  -RC      Total Minutes 29  -RC 40  -RC           User Key  (r) = Recorded By, (t) = Taken By, (c) = Cosigned By    Initials Name Provider Type    Amber Villalta COTA Occupational Therapist Assistant              Therapy Charges for Today     Code Description Service Date Service Provider Modifiers Qty    19730161712 HC OT THER PROC EA 15 MIN 6/2/2023 Amber Abdalla COTA GO 1    65231353670 HC OT SELF CARE/MGMT/TRAIN EA 15 MIN 6/2/2023 Amber Abdalla COTA GO 1    51062665176 HC OT THER PROC EA 15 MIN 6/2/2023 Amber Abdalla COTA GO 1    81344137377 HC OT SELF CARE/MGMT/TRAIN EA 15 MIN 6/2/2023 Amber Abdalla COTA GO 1    38998839382 HC OT THERAPEUTIC ACT EA 15 MIN 6/2/2023 Amber Abdalla COTA GO 1               PEGGY Beard  6/2/2023

## 2023-06-02 NOTE — PLAN OF CARE
Goal Outcome Evaluation:  Plan of Care Reviewed With: patient        Progress: improving  Outcome Evaluation: Pt sup in bed and agreeable to therapy. Pt t/f sup to sit with spv. Pt stood with CGA and amb without AD but holding to wall railing when available for 84ft, 72ft and 88ft with seated rest in between due to SOA and LE fatigue. Pt able to maintain good O2 level throughout gait trials and returned to her room and t/f to sup without issue. Pt would cont to benefit from therapy at this time.

## 2023-06-02 NOTE — THERAPY TREATMENT NOTE
Patient Name: Jo Ann Lacy  : 1960    MRN: [id number]                              Today's Date: 2023       Admit Date: 2023    Visit Dx:     ICD-10-CM ICD-9-CM   1. Tubular adenoma of colon  D12.6 211.3   2. Impaired functional mobility, balance, gait, and endurance  Z74.09 V49.89   3. Impaired mobility and ADLs  Z74.09 V49.89    Z78.9      Patient Active Problem List   Diagnosis   • Tubular adenoma of colon   • Villous adenoma of colon   • Benign polyp of large intestine   • Essential hypertension   • Vitamin D deficiency   • Hypertriglyceridemia   • Hypercholesteremia   • Acute bilateral knee pain   • Dyslipidemia   • Stress   • Anxiety   • History of colon polyps     Past Medical History:   Diagnosis Date   • Arthritis    • Benign polyp of large intestine    • Elevated cholesterol    • Essential hypertension    • Gastroesophageal reflux disease    • History of transfusion    • Hypertension    • Postmenopausal state    • Tubular adenoma of colon    • Villous adenoma of colon     1.6cm VTA with intermediate grade dysplasia transverse.  1.0cm VTA with intermediate grade dysplasia ascending   • Vitamin D deficiency      Past Surgical History:   Procedure Laterality Date   • COLONOSCOPY  2016    Many 4-5 mm polyps in the transverse colon and in the ascending colon.Resected and retrieved.External and internal hemorrhoids   • COLONOSCOPY N/A 03/10/2023    Procedure: COLONOSCOPY;  Surgeon: Last Miranda MD;  Location: [facility] ENDOSCOPY;  Service: Gastroenterology;  Laterality: N/A;   • ECTOPIC PREGNANCY SURGERY     • KNEE SURGERY Left    • TOTAL ABDOMINAL HYSTERECTOMY WITH SALPINGO OOPHORECTOMY        General Information     Row Name 23 0805          OT Time and Intention    Document Type therapy note (daily note)  -RC     Mode of Treatment individual therapy;occupational therapy  -RC     Row Name 23 0805          General Information    Patient Profile Reviewed yes  -RC      Existing Precautions/Restrictions fall  -     Row Name 06/02/23 0805          Cognition    Orientation Status (Cognition) oriented x 4  -[facility] Name 06/02/23 0805          Safety Issues, Functional Mobility    Impairments Affecting Function (Mobility) endurance/activity tolerance;pain;strength  -           User Key  (r) = Recorded By, (t) = Taken By, (c) = Cosigned By    Initials Name Provider Type     Amber Abdalla COTA Occupational Therapist Assistant                 Mobility/ADL's    No documentation.                Obj/Interventions    No documentation.                Goals/Plan     Row Name 06/02/23 0805          Transfer Goal 1 (OT)    Activity/Assistive Device (Transfer Goal 1, OT) transfers, all  -RC     [place] Level/Cues Needed (Transfer Goal 1, OT) modified independence  -RC     Time Frame (Transfer Goal 1, OT) long term goal (LTG);by discharge  -RC     Progress/Outcome (Transfer Goal 1, OT) goal not met  -[facility] Name 06/02/23 08          Dressing Goal 1 (OT)    Activity/Device (Dressing Goal 1, OT) dressing skills, all  -RC     [place]/Cues Needed (Dressing Goal 1, OT) independent  -RC     Time Frame (Dressing Goal 1, OT) long term goal (LTG);by discharge  -RC     Progress/Outcome (Dressing Goal 1, OT) goal not met  -[facility] Name 06/02/23 0805          Toileting Goal 1 (OT)    Activity/Device (Toileting Goal 1, OT) toileting skills, all  -RC     [place] Level/Cues Needed (Toileting Goal 1, OT) independent  -RC     Time Frame (Toileting Goal 1, OT) long term goal (LTG);by discharge  -RC     Progress/Outcome (Toileting Goal 1, OT) goal not met  -[facility] Name 06/02/23 0805          Problem Specific Goal 1 (OT)    Problem Specific Goal 1 (OT) Pt will tolerate participating in OOB fxnl activities for ~25 minutes to improve activity tolerance.  -RC     Time Frame (Problem Specific Goal 1, OT) long term goal (LTG);by discharge  -RC     Progress/Outcome (Problem Specific  Goal 1, OT) goal not met  -           User Key  (r) = Recorded By, (t) = Taken By, (c) = Cosigned By    Initials Name Provider Type    Amber Villalta COTA Occupational Therapist Assistant               Clinical Impression     Row Name 06/02/23 0805          Pain Assessment    Pretreatment Pain Rating 0/10 - no pain  -     Posttreatment Pain Rating 0/10 - no pain  -RC     Row Name 06/02/23 0805          Therapy Assessment/Plan (OT)    Rehab Potential (OT) good, to achieve stated therapy goals  -     Criteria for Skilled Therapeutic Interventions Met (OT) yes;meets criteria;skilled treatment is necessary  -     Therapy Frequency (OT) 2 times/wk  -     Row Name 06/02/23 0805          Therapy Plan Review/Discharge Plan (OT)    Anticipated Discharge Disposition (OT) home with assist;home with home health  -           User Key  (r) = Recorded By, (t) = Taken By, (c) = Cosigned By    Initials Name Provider Type    Amber Villalta COTA Occupational Therapist Assistant               Outcome Measures     Row Name 06/02/23 0805          How much help from another is currently needed...    Putting on and taking off regular lower body clothing? 3  -RC     Bathing (including washing, rinsing, and drying) 3  -RC     Toileting (which includes using toilet bed pan or urinal) 4  -RC     Putting on and taking off regular upper body clothing 4  -RC     Taking care of personal grooming (such as brushing teeth) 4  -RC     Eating meals 4  -RC     AM-PAC 6 Clicks Score (OT) 22  -     Row Name 06/02/23 1047          How much help from another person do you currently need...    Turning from your back to your side while in flat bed without using bedrails? 4  -LR     Moving from lying on back to sitting on the side of a flat bed without bedrails? 4  -LR     Moving to and from a bed to a chair (including a wheelchair)? 3  -LR     Standing up from a chair using your arms (e.g., wheelchair, bedside chair)? 3  -LR      Climbing 3-5 steps with a railing? 3  -LR     To walk in hospital room? 3  -LR     AM-PAC 6 Clicks Score (PT) 20  -LR     Highest level of mobility 6 --> Walked 10 steps or more  -LR     Row Name 06/02/23 0805          Functional Assessment    Outcome Measure Options AM-PAC 6 Clicks Daily Activity (OT)  -           User Key  (r) = Recorded By, (t) = Taken By, (c) = Cosigned By    Initials Name Provider Type    Dana Forbes, RN Registered Nurse     Amber Abdalla COTA Occupational Therapist Assistant                Occupational Therapy Education     Title: PT OT SLP Therapies (In Progress)     Topic: Occupational Therapy (In Progress)     Point: ADL training (Done)     Description:   Instruct learner(s) on proper safety adaptation and remediation techniques during self care or transfers.   Instruct in proper use of assistive devices.              Learning Progress Summary           Patient Acceptance, E, VU,NR by  at 6/2/2023 1109    Acceptance, E,TB, VU by  at 6/1/2023 1531    Comment: OT role, POC, t/f training                   Point: Home exercise program (Not Started)     Description:   Instruct learner(s) on appropriate technique for monitoring, assisting and/or progressing therapeutic exercises/activities.              Learner Progress:  Not documented in this visit.          Point: Precautions (Done)     Description:   Instruct learner(s) on prescribed precautions during self-care and functional transfers.              Learning Progress Summary           Patient Acceptance, E, VU,NR by  at 6/2/2023 1109    Acceptance, E,TB, VU by  at 6/1/2023 1531    Comment: OT role, POC, t/f training                   Point: Body mechanics (Done)     Description:   Instruct learner(s) on proper positioning and spine alignment during self-care, functional mobility activities and/or exercises.              Learning Progress Summary           Patient Acceptance, E, VU,NR by  at 6/2/2023 1109     Acceptance, E,TB, VU by  at 6/1/2023 1531    Comment: OT role, POC, t/f training                               User Key     Initials Effective Dates Name Provider Type Discipline     06/16/21 -  Amber Abdalla COTA Occupational Therapist Assistant OT     10/19/22 -  Destiney Henriquez OT Occupational Therapist OT              OT Recommendation and Plan  Therapy Frequency (OT): 2 times/wk  Plan of Care Review  Plan of Care Reviewed With: patient  Progress: improving  Outcome Evaluation: nursing ok'd OT pt agreeable, sup<>sit sba, sit<>stand sba, amb in [place] ~ 20 ft fww sba, toilet transfer sba toileting sba, pt working well w/ OT cont poc     Time Calculation:    Time Calculation- OT     Row Name 06/02/23 0834             Time Calculation- OT    OT Start Time 0805  -      OT Stop Time 0845  -      OT Time Calculation (min) 40 min  -RC      Total Timed Code Minutes- OT 40 minute(s)  -RC      OT Received On 06/02/23  -         Timed Charges    94440 - OT Therapeutic Exercise Minutes 15  -RC      83461 - OT Therapeutic Activity Minutes 15  -RC      47485 - OT Self Care/Mgmt Minutes 10  -RC         Total Minutes    Timed Charges Total Minutes 40  -RC       Total Minutes 40  -RC            User Key  (r) = Recorded By, (t) = Taken By, (c) = Cosigned By    Initials Name Provider Type     Amber Abdalla COTA Occupational Therapist Assistant                       PEGGY Beard  6/2/2023

## 2023-06-02 NOTE — PLAN OF CARE
Goal Outcome Evaluation:  Plan of Care Reviewed With: patient        Progress: improving  Outcome Evaluation: Pt sup in bed and agreeable to therapy. Pt cont to display good ability to t/f and amb with no AD but did desat this pm during tx to 82% from 93% initially. Pt returned to room and back to sup due to wanting to take a nap prior to supper. Pt with good O2 levels at end of tx and instructed on proper tech of using insentive spirometor. Pt demonstrated good understanding. Pt left in sup with all needs met. Pt would cont to benefit from therapy at this time.

## 2023-06-02 NOTE — PLAN OF CARE
Goal Outcome Evaluation:  Plan of Care Reviewed With: patient        Progress: improving  Outcome Evaluation: VSS, incision CDI, [place] in place and functioning appropriately, c/o pain managed with PRN pain meds per orders per pt request, pt chewing gum, tolerating clear liquids diet at this time, no other complaints, resting in between care.

## 2023-06-03 PROCEDURE — 97116 GAIT TRAINING THERAPY: CPT

## 2023-06-03 PROCEDURE — 97110 THERAPEUTIC EXERCISES: CPT

## 2023-06-03 PROCEDURE — 97530 THERAPEUTIC ACTIVITIES: CPT

## 2023-06-03 PROCEDURE — 99024 POSTOP FOLLOW-UP VISIT: CPT | Performed by: SURGERY

## 2023-06-03 PROCEDURE — 25010000002 ENOXAPARIN PER 10 MG: Performed by: SURGERY

## 2023-06-03 PROCEDURE — 97535 SELF CARE MNGMENT TRAINING: CPT

## 2023-06-03 RX ADMIN — GABAPENTIN 300 MG: 300 CAPSULE ORAL at 15:07

## 2023-06-03 RX ADMIN — OXYCODONE HYDROCHLORIDE 5 MG: 5 TABLET ORAL at 20:46

## 2023-06-03 RX ADMIN — OXYCODONE HYDROCHLORIDE 5 MG: 5 TABLET ORAL at 12:12

## 2023-06-03 RX ADMIN — ENOXAPARIN SODIUM 40 MG: 40 INJECTION SUBCUTANEOUS at 07:43

## 2023-06-03 RX ADMIN — FAMOTIDINE 20 MG: 20 TABLET ORAL at 07:43

## 2023-06-03 RX ADMIN — GABAPENTIN 300 MG: 300 CAPSULE ORAL at 07:43

## 2023-06-03 RX ADMIN — ACETAMINOPHEN 1000 MG: 500 TABLET, FILM COATED ORAL at 02:00

## 2023-06-03 RX ADMIN — FAMOTIDINE 20 MG: 20 TABLET ORAL at 20:30

## 2023-06-03 RX ADMIN — GABAPENTIN 300 MG: 300 CAPSULE ORAL at 20:30

## 2023-06-03 RX ADMIN — ACETAMINOPHEN 1000 MG: 500 TABLET, FILM COATED ORAL at 06:23

## 2023-06-03 NOTE — PLAN OF CARE
Problem: Adult Inpatient Plan of Care  Goal: Plan of Care Review  Outcome: Ongoing, Progressing  Flowsheets  Taken 6/3/2023 1553  Progress: improving  Plan of Care Reviewed With: patient  Taken 6/3/2023 1546  Progress: improving  Plan of Care Reviewed With: patient  Outcome Evaluation: Patient supine in bed. Supine to sit<>sit to supine SBA. T/f to toilet SBA. Toileting Independent. UB and LB bathing and dressing Mod I. Grooming Mod I. Patient pleasant and cooperative. Pt supine in bed all needs in reach. Pt would continue to benefit from OT services.   Goal Outcome Evaluation:  Plan of Care Reviewed With: patient        Progress: improving  Outcome Evaluation: Patient supine in bed. Supine to sit<>sit to supine SBA. T/f to toilet SBA. Toileting Independent. UB and LB bathing and dressing Mod I. Grooming Mod I. Patient pleasant and cooperative. Pt supine in bed all needs in reach. Pt would continue to benefit from OT services.

## 2023-06-03 NOTE — PLAN OF CARE
Problem: Adult Inpatient Plan of Care  Goal: Plan of Care Review  6/3/2023 1557 by Cherise Garza COTA  Outcome: Ongoing, Progressing  Flowsheets  Taken 6/3/2023 1557  Progress: improving  Plan of Care Reviewed With: patient  Taken 6/3/2023 1555  Progress: improving  Plan of Care Reviewed With: patient  Outcome Evaluation: Pt supine in bed. Just completing PT. Educated patient on Home safety and EC/WS.   Goal Outcome Evaluation:  Plan of Care Reviewed With: patient        Progress: improving  Outcome Evaluation: Pt supine in bed. Just completing PT. Educated patient on Home safety and EC/WS.

## 2023-06-03 NOTE — PLAN OF CARE
Goal Outcome Evaluation:  Plan of Care Reviewed With: patient        Progress: improving  Outcome Evaluation: VSS, pt has had two loose BMs, will advance diet, ambulating in [place], will continue to monitor

## 2023-06-03 NOTE — THERAPY TREATMENT NOTE
Patient Name: Jo Ann Lacy  : 1960    MRN: [id number]                              Today's Date: 6/3/2023       Admit Date: 2023    Visit Dx:     ICD-10-CM ICD-9-CM   1. Tubular adenoma of colon  D12.6 211.3   2. Impaired functional mobility, balance, gait, and endurance  Z74.09 V49.89   3. Impaired mobility and ADLs  Z74.09 V49.89    Z78.9      Patient Active Problem List   Diagnosis    Tubular adenoma of colon    Villous adenoma of colon    Benign polyp of large intestine    Essential hypertension    Vitamin D deficiency    Hypertriglyceridemia    Hypercholesteremia    Acute bilateral knee pain    Dyslipidemia    Stress    Anxiety    History of colon polyps     Past Medical History:   Diagnosis Date    Arthritis     Benign polyp of large intestine     Elevated cholesterol     Essential hypertension     Gastroesophageal reflux disease     History of transfusion     Hypertension     Postmenopausal state     Tubular adenoma of colon     Villous adenoma of colon     1.6cm VTA with intermediate grade dysplasia transverse.  1.0cm VTA with intermediate grade dysplasia ascending    Vitamin D deficiency      Past Surgical History:   Procedure Laterality Date    COLONOSCOPY  2016    Many 4-5 mm polyps in the transverse colon and in the ascending colon.Resected and retrieved.External and internal hemorrhoids    COLONOSCOPY N/A 03/10/2023    Procedure: COLONOSCOPY;  Surgeon: Last Miranda MD;  Location: [facility] ENDOSCOPY;  Service: Gastroenterology;  Laterality: N/A;    ECTOPIC PREGNANCY SURGERY      KNEE SURGERY Left     TOTAL ABDOMINAL HYSTERECTOMY WITH SALPINGO OOPHORECTOMY        General Information       Row Name 23 1542          OT Time and Intention    Document Type therapy note (daily note)  -LW     Mode of Treatment individual therapy;occupational therapy  -LW       Row Name 23 1542          General Information    Patient Profile Reviewed yes  -LW     Existing  Precautions/Restrictions fall  -       Row Name 06/03/23 1542          Cognition    Orientation Status (Cognition) oriented x 4  -       Row Name 06/03/23 1542          Safety Issues, Functional Mobility    Impairments Affecting Function (Mobility) endurance/activity tolerance;pain;strength  -               User Key  (r) = Recorded By, (t) = Taken By, (c) = Cosigned By      Initials Name Provider Type     Cherise Garza COTA Occupational Therapist Assistant                     Mobility/ADL's       Row Name 06/03/23 1542          Bed Mobility    Bed Mobility supine-sit;sit-supine  -     Supine-Sit [place] (Bed Mobility) standby assist  -     Sit-Supine [place] (Bed Mobility) standby assist  -     Assistive Device (Bed Mobility) bed rails;head of bed elevated  -       Row Name 06/03/23 1542          Transfers    Transfers sit-stand transfer;stand-sit transfer;toilet transfer  -       Row Name 06/03/23 1542          Sit-Stand Transfer    Sit-Stand [place] (Transfers) standby assist  -     Assistive Device (Sit-Stand Transfers) walker, front-wheeled  -       Row Name 06/03/23 1542          Stand-Sit Transfer    Stand-Sit [place] (Transfers) standby assist  -     Assistive Device (Stand-Sit Transfers) walker, front-wheeled  -       Row Name 06/03/23 1542          Toilet Transfer    Type (Toilet Transfer) sit-stand;stand-sit  -     [place] Level (Toilet Transfer) standby assist  -     Assistive Device (Toilet Transfer) commode;walker, front-wheeled  -       Row Name 06/03/23 1542          Activities of Daily Living    BADL Assessment/Intervention bathing;upper body dressing;lower body dressing;grooming;toileting  -       Row Name 06/03/23 1542          Bathing Assessment/Intervention    [place] Level (Bathing) bathing skills;lower body;upper body;upper extremities;modified independence  -     Position (Bathing) edge of bed sitting  -       Row Name  06/03/23 1542          Upper Body Dressing Assessment/Training    [place] Level (Upper Body Dressing) upper body dressing skills;doff;don;pull-over garment;modified independence  -LW     Position (Upper Body Dressing) edge of bed sitting  -LW       Row Name 06/03/23 1542          Lower Body Dressing Assessment/Training    [place] Level (Lower Body Dressing) lower body dressing skills;doff;don;pants/bottoms;socks;modified independence  -LW     Position (Lower Body Dressing) edge of bed sitting  -LW       Row Name 06/03/23 1542          Grooming Assessment/Training    [place] Level (Grooming) oral care regimen;wash face, hands;modified independence  -LW     Position (Grooming) edge of bed sitting  -LW       Row Name 06/03/23 1542          Toileting Assessment/Training    [place] Level (Toileting) toileting skills;adjust/manage clothing;perform perineal hygiene;modified independence  -LW     Assistive Devices (Toileting) commode;grab bar/safety frame  -LW     Position (Toileting) unsupported sitting  -LW               User Key  (r) = Recorded By, (t) = Taken By, (c) = Cosigned By      Initials Name Provider Type    LW Cherise Garza COTA Occupational Therapist Assistant                   Obj/Interventions    No documentation.                  Goals/Plan       Row Name 06/03/23 1145          Transfer Goal 1 (OT)    Activity/Assistive Device (Transfer Goal 1, OT) transfers, all  -LW     [place] Level/Cues Needed (Transfer Goal 1, OT) modified independence  -LW     Time Frame (Transfer Goal 1, OT) long term goal (LTG);by discharge  -LW     Progress/Outcome (Transfer Goal 1, OT) goal not met  -       Row Name 06/03/23 1145          Dressing Goal 1 (OT)    Activity/Device (Dressing Goal 1, OT) dressing skills, all  -LW     [place]/Cues Needed (Dressing Goal 1, OT) independent  -LW     Time Frame (Dressing Goal 1, OT) long term goal (LTG);by discharge  -LW     Progress/Outcome (Dressing  Goal 1, OT) goal not met  -LW       Row Name 06/03/23 1145          Toileting Goal 1 (OT)    Activity/Device (Toileting Goal 1, OT) toileting skills, all  -LW     [place] Level/Cues Needed (Toileting Goal 1, OT) independent  -LW     Time Frame (Toileting Goal 1, OT) long term goal (LTG);by discharge  -LW     Progress/Outcome (Toileting Goal 1, OT) goal met   -LW       Row Name 06/03/23 1145          Problem Specific Goal 1 (OT)    Problem Specific Goal 1 (OT) Pt will tolerate participating in OOB fxnl activities for ~25 minutes to improve activity tolerance.  -LW     Time Frame (Problem Specific Goal 1, OT) long term goal (LTG);by discharge  -LW     Progress/Outcome (Problem Specific Goal 1, OT) goal not met  -LW               User Key  (r) = Recorded By, (t) = Taken By, (c) = Cosigned By      Initials Name Provider Type    LW Cherise Garza COTA Occupational Therapist Assistant                   Clinical Impression       Row Name 06/03/23 1546          Pain Assessment    Pretreatment Pain Rating 3/10  -LW     Posttreatment Pain Rating 3/10  -LW     Pain Location - Side/Orientation Right  -LW     Pain Location lower  -LW     Pain Location - abdomen  -LW       Row Name 06/03/23 1546          Plan of Care Review    Plan of Care Reviewed With patient  -LW     Progress improving  -LW     Outcome Evaluation Patient supine in bed. Supine to sit<>sit to supine SBA. T/f to toilet SBA. Toileting Independent. UB and LB bathing and dressing Mod I. Grooming Mod I. Patient pleasant and cooperative. Pt supine in bed all needs in reach. Pt would continue to benefit from OT services.  -LW       Row Name 06/03/23 1546          Positioning and Restraints    Pre-Treatment Position in bed  -LW     Post Treatment Position bed  -LW     In Bed supine;call light within reach;encouraged to call for assist;exit alarm on  -LW               User Key  (r) = Recorded By, (t) = Taken By, (c) = Cosigned By      Initials Name Provider Type     Cherise Weaver COTA Occupational Therapist Assistant                   Outcome Measures       Row Name 06/03/23 1550          How much help from another is currently needed...    Putting on and taking off regular lower body clothing? 4  -LW     Bathing (including washing, rinsing, and drying) 4  -LW     Toileting (which includes using toilet bed pan or urinal) 4  -LW     Putting on and taking off regular upper body clothing 4  -LW     Taking care of personal grooming (such as brushing teeth) 4  -LW     Eating meals 4  -LW     AM-PAC 6 Clicks Score (OT) 24  -LW       Row Name 06/03/23 0846          How much help from another person do you currently need...    Turning from your back to your side while in flat bed without using bedrails? 4  -LR     Moving from lying on back to sitting on the side of a flat bed without bedrails? 4  -LR     Moving to and from a bed to a chair (including a wheelchair)? 3  -LR     Standing up from a chair using your arms (e.g., wheelchair, bedside chair)? 3  -LR     Climbing 3-5 steps with a railing? 3  -LR     To walk in hospital room? 3  -LR     AM-PAC 6 Clicks Score (PT) 20  -LR     Highest level of mobility 6 --> Walked 10 steps or more  -LR               User Key  (r) = Recorded By, (t) = Taken By, (c) = Cosigned By      Initials Name Provider Type    LR Dana Diaz RN Registered Nurse    Cherise Weaver COTA Occupational Therapist Assistant                    Occupational Therapy Education       Title: PT OT SLP Therapies (In Progress)       Topic: Occupational Therapy (Done)       Point: ADL training (Done)       Description:   Instruct learner(s) on proper safety adaptation and remediation techniques during self care or transfers.   Instruct in proper use of assistive devices.                  Learning Progress Summary             Patient Acceptance, E,TB, VU by TOMA at 6/3/2023 1553    Acceptance, E, VU,NR by LAY at 6/2/2023 1159    Acceptance, E, VU,NR by LAY at  6/2/2023 1109    Acceptance, E,TB, VU by  at 6/1/2023 1531    Comment: OT role, POC, t/f training                         Point: Home exercise program (Done)       Description:   Instruct learner(s) on appropriate technique for monitoring, assisting and/or progressing therapeutic exercises/activities.                  Learning Progress Summary             Patient Acceptance, E,TB, VU by  at 6/3/2023 1553                         Point: Precautions (Done)       Description:   Instruct learner(s) on prescribed precautions during self-care and functional transfers.                  Learning Progress Summary             Patient Acceptance, E,TB, VU by  at 6/3/2023 1553    Acceptance, E, VU,NR by  at 6/2/2023 1159    Acceptance, E, VU,NR by  at 6/2/2023 1109    Acceptance, E,TB, VU by  at 6/1/2023 1531    Comment: OT role, POC, t/f training                         Point: Body mechanics (Done)       Description:   Instruct learner(s) on proper positioning and spine alignment during self-care, functional mobility activities and/or exercises.                  Learning Progress Summary             Patient Acceptance, E,TB, VU by  at 6/3/2023 1553    Acceptance, E, VU,NR by  at 6/2/2023 1159    Acceptance, E, VU,NR by  at 6/2/2023 1109    Acceptance, E,TB, VU by  at 6/1/2023 1531    Comment: OT role, POC, t/f training                                         User Key       Initials Effective Dates Name Provider Type Discipline     06/16/21 -  Amber Abdalla COTA Occupational Therapist Assistant OT     06/16/21 -  Cherise Garza COTA Occupational Therapist Assistant OT     10/19/22 -  Destiney Henriquez OT Occupational Therapist OT                  OT Recommendation and Plan     Plan of Care Review  Plan of Care Reviewed With: patient  Progress: improving  Outcome Evaluation: Patient supine in bed. Supine to sit<>sit to supine SBA. T/f to toilet SBA. Toileting Independent. UB and LB bathing and  dressing Mod I. Grooming Mod I. Patient pleasant and cooperative. Pt supine in bed all needs in reach. Pt would continue to benefit from OT services.     Time Calculation:    Time Calculation- OT       Row Name 06/03/23 1553             Time Calculation- OT    OT Start Time 1145  -LW      OT Stop Time 1240  -LW      OT Time Calculation (min) 55 min  -LW      Total Timed Code Minutes- OT 55 minute(s)  -LW      OT Received On 06/03/23  -LW         Timed Charges    22632 - OT Self Care/Mgmt Minutes 55  -LW         Total Minutes    Timed Charges Total Minutes 55  -LW       Total Minutes 55  -LW                User Key  (r) = Recorded By, (t) = Taken By, (c) = Cosigned By      Initials Name Provider Type    LW Cherise Garza COTA Occupational Therapist Assistant                  Therapy Charges for Today       Code Description Service Date Service Provider Modifiers Qty    [id number] HC OT SELF CARE/MGMT/TRAIN EA 15 MIN 6/3/2023 Cherise Garza COTA GO 4                 PEGGY Gifford  6/3/2023

## 2023-06-03 NOTE — THERAPY TREATMENT NOTE
Acute Care - Physical Therapy Treatment Note  [facility]     Patient Name: Jo Ann Lacy  : 1960  MRN: [id number]  Today's Date: 6/3/2023      Visit Dx:     ICD-10-CM ICD-9-CM   1. Tubular adenoma of colon  D12.6 211.3   2. Impaired functional mobility, balance, gait, and endurance  Z74.09 V49.89   3. Impaired mobility and ADLs  Z74.09 V49.89    Z78.9      Patient Active Problem List   Diagnosis    Tubular adenoma of colon    Villous adenoma of colon    Benign polyp of large intestine    Essential hypertension    Vitamin D deficiency    Hypertriglyceridemia    Hypercholesteremia    Acute bilateral knee pain    Dyslipidemia    Stress    Anxiety    History of colon polyps     Past Medical History:   Diagnosis Date    Arthritis     Benign polyp of large intestine     Elevated cholesterol     Essential hypertension     Gastroesophageal reflux disease     History of transfusion     Hypertension     Postmenopausal state     Tubular adenoma of colon     Villous adenoma of colon     1.6cm VTA with intermediate grade dysplasia transverse.  1.0cm VTA with intermediate grade dysplasia ascending    Vitamin D deficiency      Past Surgical History:   Procedure Laterality Date    COLONOSCOPY  2016    Many 4-5 mm polyps in the transverse colon and in the ascending colon.Resected and retrieved.External and internal hemorrhoids    COLONOSCOPY N/A 03/10/2023    Procedure: COLONOSCOPY;  Surgeon: Last Miranda MD;  Location: [facility] ENDOSCOPY;  Service: Gastroenterology;  Laterality: N/A;    ECTOPIC PREGNANCY SURGERY      KNEE SURGERY Left     TOTAL ABDOMINAL HYSTERECTOMY WITH SALPINGO OOPHORECTOMY       PT Assessment (last 12 hours)       PT Evaluation and Treatment       Row Name 23 1307 23 0958       Physical Therapy Time and Intention    Subjective Information complains of;pain;dizziness  -TW complains of;pain  -TW    Document Type therapy note (daily note)  -TW therapy note (daily note)  -TW     Mode of Treatment physical therapy;individual therapy  -TW physical therapy;individual therapy  -TW    Patient Effort good  -TW good  -TW      Row Name 06/03/23 1307 06/03/23 0958       General Information    Patient Profile Reviewed yes  -TW yes  -TW    Patient Observations alert;cooperative;agree to therapy  -TW alert;cooperative;agree to therapy  -TW    Patient/Family/Caregiver Comments/Observations none  -TW none  -TW    General Observations of Patient Pt sup in bed.  -TW Pt sup in bed.  -TW    Existing Precautions/Restrictions fall  -TW fall  -TW      Row Name 06/03/23 1307 06/03/23 0958       Pain    Pretreatment Pain Rating 2/10  -TW 2/10  -TW    Posttreatment Pain Rating 2/10  -TW 2/10  -TW    Pain Location - Side/Orientation -- Right  -TW    Pain Location lower  -TW --    Pain Location - abdomen  -TW flank  -TW      Row Name 06/03/23 1307 06/03/23 0958       Cognition    Affect/Mental Status (Cognition) WFL  -TW WFL  -TW    Orientation Status (Cognition) oriented x 4  -TW oriented x 4  -TW    Follows Commands (Cognition) WFL  -TW WFL  -TW    Personal Safety Interventions fall prevention program maintained;gait belt;nonskid shoes/slippers when out of bed;muscle strengthening facilitated  -TW fall prevention program maintained;muscle strengthening facilitated;nonskid shoes/slippers when out of bed  -TW      Row Name 06/03/23 1307 06/03/23 0958       Bed Mobility    Supine-Sit [place] (Bed Mobility) supervision  -TW supervision  -TW    Sit-Supine [place] (Bed Mobility) supervision  -TW supervision  -TW    Assistive Device (Bed Mobility) head of bed elevated  -TW head of bed elevated  -TW      Row Name 06/03/23 1307 06/03/23 0958       Sit-Stand Transfer    Sit-Stand [place] (Transfers) contact guard  -TW contact guard  -TW    Assistive Device (Sit-Stand Transfers) other (see comments);walker, front-wheeled  -TW other (see comments)  No AD used but pt held to wall railing when available.  -TW       Row Name 06/03/23 1307 06/03/23 0958       Stand-Sit Transfer    Stand-Sit [place] (Transfers) contact guard  -TW contact guard  -TW    Assistive Device (Stand-Sit Transfers) walker, front-wheeled  -TW other (see comments)  No AD used but pt held to wall railing when available.  -TW      Row Name 06/03/23 1307 06/03/23 0958       Gait/Stairs (Locomotion)    [place] Level (Gait) contact guard  -TW contact guard  -TW    Assistive Device (Gait) walker, front-wheeled  -TW other (see comments)  No AD used but pt held to wall railing when available.  -TW    Distance in Feet (Gait) 312ft x2  -TW 246ft and 312ft  -TW      Row Name 06/03/23 0958          Motor Skills    Therapeutic Exercise hip;knee;ankle  -TW       Row Name 06/03/23 1307 06/03/23 0958       Hip (Therapeutic Exercise)    Hip (Therapeutic Exercise) -- AROM (active range of motion)  -TW    Hip AROM (Therapeutic Exercise) bilateral;sitting  -TW bilateral;sitting  -TW      Row Name 06/03/23 1307 06/03/23 0958       Knee (Therapeutic Exercise)    Knee (Therapeutic Exercise) -- AROM (active range of motion)  -TW    Knee AROM (Therapeutic Exercise) bilateral;sitting  -TW bilateral;sitting  -TW      Row Name 06/03/23 1307 06/03/23 0958       Ankle (Therapeutic Exercise)    Ankle (Therapeutic Exercise) -- AROM (active range of motion)  -TW    Ankle AROM (Therapeutic Exercise) bilateral;sitting  -TW bilateral;sitting  -TW      Row Name             Wound 06/01/23 0756 abdomen Incision    Wound - Properties Group Placement Date: 06/01/23  - Placement Time: 0756 -HC Location: abdomen  -HC Primary Wound Type: Incision  -HC    Retired Wound - Properties Group Placement Date: 06/01/23  -HC Placement Time: 0756 -HC Location: abdomen  -HC Primary Wound Type: Incision  -HC    Retired Wound - Properties Group Date first assessed: 06/01/23  -HC Time first assessed: 0756 - Location: abdomen  -HC Primary Wound Type: Incision  -HC      Row Name 06/03/23 1307  06/03/23 0958       Plan of Care Review    Plan of Care Reviewed With patient  -TW patient  -TW    Progress improving  -TW improving  -TW    Outcome Evaluation Pt sup in bed and agreeable tot herapy. Pt t/f sup to sit to sup with spv. Pt stood with CGA and amb with RW and CGA this pm due to pt c/o increase dizziness with tx. Pt able to amb 312ft with RW and CGA with c/o dizziness after each gait trial. Pt VS assessed after each trial and O2 sats ranged from 88% to 91% after each gait which pt was able to recover very quickly with seated rest. Pt also noted to have elevated BP with gait but again recovered quickly with rest. Pt performed B LE ther ex in sitting without c/o and was able to return to sup position in bed. Pt would cont to benefit from therapy at this time.  -TW Pt agreeable to therapy. Pt was able to t/f sup to sit with spv using bed rail and HOB elevated. Pt stood with CGA and amb with no AD for 246ft and 312ft with seated rest in between. Pt able to perform B LE ther ex in between gait trials. Pt desatted with gait to 89% but quickly recovered with seated rest. Pt returned to room and to sup with same assist level and was left with all needs met. Pt would cont to benefit fromt herapy at this time.  -TW      Row Name 06/03/23 1307 06/03/23 0958       Vital Signs    Pre Systolic BP Rehab 146  -TW --    Pre Treatment Diastolic BP 80  -TW --    Intra Systolic BP Rehab 186  Nsg made aware.  -TW --    Intra Treatment Diastolic BP 96  -TW --    Post Systolic BP Rehab 157  -  -TW    Post Treatment Diastolic BP 78  -TW 71  -TW    Pretreatment Heart Rate (beats/min) 70  -TW 74  -TW    Intratreatment Heart Rate (beats/min) 76  -TW 84  -TW    Posttreatment Heart Rate (beats/min) 68  -TW 72  -TW    Pre SpO2 (%) 94  -TW 97  -TW    O2 Delivery Pre Treatment room air  -TW room air  -TW    Intra SpO2 (%) 88  -TW 89  -TW    O2 Delivery Intra Treatment room air  -TW room air  -TW    Post SpO2 (%) 97  -TW 98  -TW     O2 Delivery Post Treatment room air  -TW room air  -TW    Pre Patient Position Supine  -TW Supine  -TW    Intra Patient Position Standing  -TW Standing  -TW    Post Patient Position Supine  -TW Supine  -TW      Row Name 06/03/23 1307 06/03/23 0958       Positioning and Restraints    Pre-Treatment Position in bed  -TW in bed  -TW    Post Treatment Position bed  -TW bed  -TW    In Bed supine;call light within reach;encouraged to call for assist;exit alarm on  -TW supine;call light within reach;encouraged to call for assist;exit alarm on  -TW      Row Name 06/03/23 1307 06/03/23 0958       Therapy Assessment/Plan (PT)    Rehab Potential (PT) good, to achieve stated therapy goals  -TW good, to achieve stated therapy goals  -TW      Row Name 06/03/23 1307 06/03/23 0958       Bed Mobility Goal 1 (PT)    Activity/Assistive Device (Bed Mobility Goal 1, PT) bed mobility activities, all  -TW bed mobility activities, all  -TW    [place] Level/Cues Needed (Bed Mobility Goal 1, PT) modified independence  -TW modified independence  -TW    Time Frame (Bed Mobility Goal 1, PT) by discharge  -TW by discharge  -TW    Progress/Outcomes (Bed Mobility Goal 1, PT) goal not met  -TW goal not met  -TW      Row Name 06/03/23 1307 06/03/23 0958       Transfer Goal 1 (PT)    Activity/Assistive Device (Transfer Goal 1, PT) sit-to-stand/stand-to-sit;bed-to-chair/chair-to-bed  -TW sit-to-stand/stand-to-sit;bed-to-chair/chair-to-bed  -TW    [place] Level/Cues Needed (Transfer Goal 1, PT) modified independence  -TW modified independence  -TW    Time Frame (Transfer Goal 1, PT) by discharge  -TW by discharge  -TW    Progress/Outcome (Transfer Goal 1, PT) goal not met  -TW goal not met  -TW      Row Name 06/03/23 1307 06/03/23 0958       Gait Training Goal 1 (PT)    Activity/Assistive Device (Gait Training Goal 1, PT) gait (walking locomotion);increase endurance/gait distance  -TW gait (walking locomotion);increase endurance/gait  distance  -TW    [place] Level (Gait Training Goal 1, PT) modified independence  -TW modified independence  -TW    Distance (Gait Training Goal 1, PT) 25' x 2  -TW 25' x 2  -TW    Time Frame (Gait Training Goal 1, PT) by discharge  -TW by discharge  -TW    Progress/Outcome (Gait Training Goal 1, PT) goal not met  -TW goal not met  -TW      Row Name 06/03/23 1307 06/03/23 0958       Problem Specific Goal 1 (PT)    Problem Specific Goal 1 (PT) Improve LE strength to 4/5  -TW Improve LE strength to 4/5  -TW    Time Frame (Problem Specific Goal 1, PT) by discharge  -TW by discharge  -TW    Progress/Outcome (Problem Specific Goal 1, PT) goal not met  -TW goal not met  -TW              User Key  (r) = Recorded By, (t) = Taken By, (c) = Cosigned By      Initials Name Provider Type    TW Sim Hernandez, PTA Physical Therapist Assistant    Jeanne Tam, RN Registered Nurse                    Physical Therapy Education       Title: PT OT SLP Therapies (In Progress)       Topic: Physical Therapy (In Progress)       Point: Mobility training (Done)       Learning Progress Summary             Patient Acceptance, E, VU,NR by  at 6/1/2023 1308    Comment: POC and goals                         Point: Home exercise program (Not Started)       Learner Progress:  Not documented in this visit.              Point: Body mechanics (Not Started)       Learner Progress:  Not documented in this visit.              Point: Precautions (Not Started)       Learner Progress:  Not documented in this visit.                              User Key       Initials Effective Dates Name Provider Type Discipline     01/09/23 -  Jeanne Dotson PT Physical Therapist PT                  PT Recommendation and Plan  Anticipated Discharge Disposition (PT): home with 24/7 care, home with home health, LT[facility] (long-term care hospital), skilled nursing facility  Plan of Care Reviewed With: patient  Progress: improving  Outcome Evaluation: Pt sup  in bed and agreeable tot herapy. Pt t/f sup to sit to sup with spv. Pt stood with CGA and amb with RW and CGA this pm due to pt c/o increase dizziness with tx. Pt able to amb 312ft with RW and CGA with c/o dizziness after each gait trial. Pt VS assessed after each trial and O2 sats ranged from 88% to 91% after each gait which pt was able to recover very quickly with seated rest. Pt also noted to have elevated BP with gait but again recovered quickly with rest. Pt performed B LE ther ex in sitting without c/o and was able to return to sup position in bed. Pt would cont to benefit from therapy at this time.       Time Calculation:    PT Charges       Row Name 06/03/23 1520 06/03/23 1210          Time Calculation    Start Time 1307  -TW 0958  -TW     Stop Time 1402  -TW 1037  -TW     Time Calculation (min) 55 min  -TW 39 min  -TW        Time Calculation- PT    Total Timed Code Minutes- PT 55 minute(s)  -TW 39 minute(s)  -TW               User Key  (r) = Recorded By, (t) = Taken By, (c) = Cosigned By      Initials Name Provider Type    TW Sim Hernandez, BENI Physical Therapist Assistant                  Therapy Charges for Today       Code Description Service Date Service Provider Modifiers Qty    41178062408 HC GAIT TRAINING EA 15 MIN 6/2/2023 Sim Hernandez PTA GP 1    01434776884 HC PT THERAPEUTIC ACT EA 15 MIN 6/2/2023 Sim Hernandez, PTA GP 1    53889357074 HC GAIT TRAINING EA 15 MIN 6/2/2023 Sim Hernandez PTA GP 2    06187872679 HC PT THERAPEUTIC ACT EA 15 MIN 6/2/2023 Sim Hernandez, PTA GP 1    44409178440 HC GAIT TRAINING EA 15 MIN 6/3/2023 Sim Hernandez, PTA GP 1    58486285172 HC PT THERAPEUTIC ACT EA 15 MIN 6/3/2023 Sim Hernandez, PTA GP 1    02286614045 HC PT THER PROC EA 15 MIN 6/3/2023 Sim Hernandez, PTA GP 1    57257310645 HC GAIT TRAINING EA 15 MIN 6/3/2023 Sim Hernandez, PTA GP 1    97755636010 HC PT THER PROC EA 15 MIN 6/3/2023 Sim Hernandez,  PTA GP 1    64058919246 HC PT THERAPEUTIC ACT EA 15 MIN 6/3/2023 Sim Hernandez, PTA GP 2            PT G-Codes  Outcome Measure Options: AM-PAC 6 Clicks Daily Activity (OT)  AM-PAC 6 Clicks Score (PT): 20  AM-PAC 6 Clicks Score (OT): 22    Sim Hernandez PTA  6/3/2023

## 2023-06-03 NOTE — PLAN OF CARE
Goal Outcome Evaluation:               Vss, pain controlled, regular diet. Tolerating well.

## 2023-06-03 NOTE — THERAPY TREATMENT NOTE
Acute Care - Physical Therapy Treatment Note  [facility]     Patient Name: Jo Ann Lacy  : 1960  MRN: [id number]  Today's Date: 6/3/2023      Visit Dx:     ICD-10-CM ICD-9-CM   1. Tubular adenoma of colon  D12.6 211.3   2. Impaired functional mobility, balance, gait, and endurance  Z74.09 V49.89   3. Impaired mobility and ADLs  Z74.09 V49.89    Z78.9      Patient Active Problem List   Diagnosis    Tubular adenoma of colon    Villous adenoma of colon    Benign polyp of large intestine    Essential hypertension    Vitamin D deficiency    Hypertriglyceridemia    Hypercholesteremia    Acute bilateral knee pain    Dyslipidemia    Stress    Anxiety    History of colon polyps     Past Medical History:   Diagnosis Date    Arthritis     Benign polyp of large intestine     Elevated cholesterol     Essential hypertension     Gastroesophageal reflux disease     History of transfusion     Hypertension     Postmenopausal state     Tubular adenoma of colon     Villous adenoma of colon     1.6cm VTA with intermediate grade dysplasia transverse.  1.0cm VTA with intermediate grade dysplasia ascending    Vitamin D deficiency      Past Surgical History:   Procedure Laterality Date    COLONOSCOPY  2016    Many 4-5 mm polyps in the transverse colon and in the ascending colon.Resected and retrieved.External and internal hemorrhoids    COLONOSCOPY N/A 03/10/2023    Procedure: COLONOSCOPY;  Surgeon: Last Miranda MD;  Location: [facility];  Service: Gastroenterology;  Laterality: N/A;    ECTOPIC PREGNANCY SURGERY      KNEE SURGERY Left     TOTAL ABDOMINAL HYSTERECTOMY WITH SALPINGO OOPHORECTOMY       PT Assessment (last 12 hours)       PT Evaluation and Treatment       Row Name 23 0958          Physical Therapy Time and Intention    Subjective Information complains of;pain  -TW     Document Type therapy note (daily note)  -TW     Mode of Treatment physical therapy;individual therapy  -TW      Patient Effort good  -TW       Row Name 06/03/23 0958          General Information    Patient Profile Reviewed yes  -TW     Patient Observations alert;cooperative;agree to therapy  -TW     Patient/Family/Caregiver Comments/Observations none  -TW     General Observations of Patient Pt sup in bed.  -TW     Existing Precautions/Restrictions fall  -TW       Row Name 06/03/23 0958          Pain    Pretreatment Pain Rating 2/10  -TW     Posttreatment Pain Rating 2/10  -TW     Pain Location - Side/Orientation Right  -TW     Pain Location - flank  -TW       Row Name 06/03/23 0958          Cognition    Affect/Mental Status (Cognition) WFL  -TW     Orientation Status (Cognition) oriented x 4  -TW     Follows Commands (Cognition) WFL  -TW     Personal Safety Interventions fall prevention program maintained;muscle strengthening facilitated;nonskid shoes/slippers when out of bed  -TW       Row Name 06/03/23 0958          Bed Mobility    Supine-Sit [place] (Bed Mobility) supervision  -TW     Sit-Supine [place] (Bed Mobility) supervision  -TW     Assistive Device (Bed Mobility) head of bed elevated  -TW       Row Name 06/03/23 0958          Sit-Stand Transfer    Sit-Stand [place] (Transfers) contact guard  -TW     Assistive Device (Sit-Stand Transfers) other (see comments)  No AD used but pt held to wall railing when available.  -TW       Row Name 06/03/23 0958          Stand-Sit Transfer    Stand-Sit [place] (Transfers) contact guard  -TW     Assistive Device (Stand-Sit Transfers) other (see comments)  No AD used but pt held to wall railing when available.  -TW       Row Name 06/03/23 0958          Gait/Stairs (Locomotion)    [place] Level (Gait) contact guard  -TW     Assistive Device (Gait) other (see comments)  No AD used but pt held to wall railing when available.  -TW     Distance in Feet (Gait) 246ft and 312ft  -TW       Row Name 06/03/23 0958          Motor Skills    Therapeutic Exercise  hip;knee;ankle  -TW       Row Name 06/03/23 0958          Hip (Therapeutic Exercise)    Hip (Therapeutic Exercise) AROM (active range of motion)  -TW     Hip AROM (Therapeutic Exercise) bilateral;sitting  -TW       Row Name 06/03/23 0958          Knee (Therapeutic Exercise)    Knee (Therapeutic Exercise) AROM (active range of motion)  -TW     Knee AROM (Therapeutic Exercise) bilateral;sitting  -TW       Row Name 06/03/23 0958          Ankle (Therapeutic Exercise)    Ankle (Therapeutic Exercise) AROM (active range of motion)  -TW     Ankle AROM (Therapeutic Exercise) bilateral;sitting  -TW       Row Name             Wound 06/01/23 0756 abdomen Incision    Wound - Properties Group Placement Date: 06/01/23  - Placement Time: 0756  - Location: abdomen  -HC Primary Wound Type: Incision  -HC    Retired Wound - Properties Group Placement Date: 06/01/23  - Placement Time: 0756  - Location: abdomen  -HC Primary Wound Type: Incision  -HC    Retired Wound - Properties Group Date first assessed: 06/01/23  - Time first assessed: 0756 - Location: abdomen  -HC Primary Wound Type: Incision  -HC      Row Name 06/03/23 0958          Plan of Care Review    Plan of Care Reviewed With patient  -TW     Progress improving  -TW     Outcome Evaluation Pt agreeable to therapy. Pt was able to t/f sup to sit with spv using bed rail and HOB elevated. Pt stood with CGA and amb with no AD for 246ft and 312ft with seated rest in between. Pt able to perform B LE ther ex in between gait trials. Pt desatted with gait to 89% but quickly recovered with seated rest. Pt returned to room and to sup with same assist level and was left with all needs met. Pt would cont to benefit fromt herapy at this time.  -TW       Row Name 06/03/23 0958          Vital Signs    Post Systolic BP Rehab 150  -TW     Post Treatment Diastolic BP 71  -TW     Pretreatment Heart Rate (beats/min) 74  -TW     Intratreatment Heart Rate (beats/min) 84  -TW      Posttreatment Heart Rate (beats/min) 72  -TW     Pre SpO2 (%) 97  -TW     O2 Delivery Pre Treatment room air  -TW     Intra SpO2 (%) 89  -TW     O2 Delivery Intra Treatment room air  -TW     Post SpO2 (%) 98  -TW     O2 Delivery Post Treatment room air  -TW     Pre Patient Position Supine  -TW     Intra Patient Position Standing  -TW     Post Patient Position Supine  -TW       Row Name 06/03/23 0958          Positioning and Restraints    Pre-Treatment Position in bed  -TW     Post Treatment Position bed  -TW     In Bed supine;call light within reach;encouraged to call for assist;exit alarm on  -TW       Row Name 06/03/23 0958          Therapy Assessment/Plan (PT)    Rehab Potential (PT) good, to achieve stated therapy goals  -TW       Row Name 06/03/23 0958          Bed Mobility Goal 1 (PT)    Activity/Assistive Device (Bed Mobility Goal 1, PT) bed mobility activities, all  -TW     [place] Level/Cues Needed (Bed Mobility Goal 1, PT) modified independence  -TW     Time Frame (Bed Mobility Goal 1, PT) by discharge  -TW     Progress/Outcomes (Bed Mobility Goal 1, PT) goal not met  -TW       Row Name 06/03/23 0958          Transfer Goal 1 (PT)    Activity/Assistive Device (Transfer Goal 1, PT) sit-to-stand/stand-to-sit;bed-to-chair/chair-to-bed  -TW     [place] Level/Cues Needed (Transfer Goal 1, PT) modified independence  -TW     Time Frame (Transfer Goal 1, PT) by discharge  -TW     Progress/Outcome (Transfer Goal 1, PT) goal not met  -TW       Row Name 06/03/23 0958          Gait Training Goal 1 (PT)    Activity/Assistive Device (Gait Training Goal 1, PT) gait (walking locomotion);increase endurance/gait distance  -TW     [place] Level (Gait Training Goal 1, PT) modified independence  -TW     Distance (Gait Training Goal 1, PT) 25' x 2  -TW     Time Frame (Gait Training Goal 1, PT) by discharge  -TW     Progress/Outcome (Gait Training Goal 1, PT) goal not met  -TW       Row Name 06/03/23 0958           Problem Specific Goal 1 (PT)    Problem Specific Goal 1 (PT) Improve LE strength to 4/5  -TW     Time Frame (Problem Specific Goal 1, PT) by discharge  -TW     Progress/Outcome (Problem Specific Goal 1, PT) goal not met  -TW               User Key  (r) = Recorded By, (t) = Taken By, (c) = Cosigned By      Initials Name Provider Type    TW Sim Hernandez PTA Physical Therapist Assistant    Jeanne Tam, RN Registered Nurse                    Physical Therapy Education       Title: PT OT SLP Therapies (In Progress)       Topic: Physical Therapy (In Progress)       Point: Mobility training (Done)       Learning Progress Summary             Patient Acceptance, E, VU,NR by  at 6/1/2023 1308    Comment: POC and goals                         Point: Home exercise program (Not Started)       Learner Progress:  Not documented in this visit.              Point: Body mechanics (Not Started)       Learner Progress:  Not documented in this visit.              Point: Precautions (Not Started)       Learner Progress:  Not documented in this visit.                              User Key       Initials Effective Dates Name Provider Type Discipline     01/09/23 -  Jeanne Dotson PT Physical Therapist PT                  PT Recommendation and Plan  Anticipated Discharge Disposition (PT): home with 24/7 care, home with home health, LTCH (long-term care hospital), skilled nursing facility  Plan of Care Reviewed With: patient  Progress: improving  Outcome Evaluation: Pt agreeable to therapy. Pt was able to t/f sup to sit with spv using bed rail and HOB elevated. Pt stood with CGA and amb with no AD for 246ft and 312ft with seated rest in between. Pt able to perform B LE ther ex in between gait trials. Pt desatted with gait to 89% but quickly recovered with seated rest. Pt returned to room and to sup with same assist level and was left with all needs met. Pt would cont to benefit fromt herapy at this time.       Time  Calculation:    PT Charges       Row Name 06/03/23 1210             Time Calculation    Start Time 0958  -TW      Stop Time 1037  -TW      Time Calculation (min) 39 min  -TW         Time Calculation- PT    Total Timed Code Minutes- PT 39 minute(s)  -TW                User Key  (r) = Recorded By, (t) = Taken By, (c) = Cosigned By      Initials Name Provider Type    TW Sim Hernandez, BENI Physical Therapist Assistant                  Therapy Charges for Today       Code Description Service Date Service Provider Modifiers Qty    34158529751 HC GAIT TRAINING EA 15 MIN 6/2/2023 Sim Hernandez, PTA GP 1    22492795896 HC PT THERAPEUTIC ACT EA 15 MIN 6/2/2023 Sim Hernandez, PTA GP 1    85494809545 HC GAIT TRAINING EA 15 MIN 6/2/2023 Sim Hernandez, PTA GP 2    31998096889 HC PT THERAPEUTIC ACT EA 15 MIN 6/2/2023 Sim Hernandez, PTA GP 1    15740507827 HC GAIT TRAINING EA 15 MIN 6/3/2023 Sim Hernandez, PTA GP 1    15245661198 HC PT THERAPEUTIC ACT EA 15 MIN 6/3/2023 Sim Hernandez, PTA GP 1    97772462008 HC PT THER PROC EA 15 MIN 6/3/2023 Sim Hernandez, PTA GP 1            PT G-Codes  Outcome Measure Options: AM-PAC 6 Clicks Daily Activity (OT)  AM-PAC 6 Clicks Score (PT): 20  AM-PAC 6 Clicks Score (OT): 22    Sim Hernandez PTA  6/3/2023     c/o intermittent mid sternal chest pain radiating into back between shoulder blades x 1 week, worst today. Hx HTN, DM2. EO=[id number]

## 2023-06-03 NOTE — PROGRESS NOTES
GENERAL SURGERY PROGRESS NOTE     LOS: 2 days       Interval History:     No acute events overnight.  Patient tolerating a diet and having bowel movements.  She complains of mild abdominal pain and headache    Medication Review:   acetaminophen, 1,000 mg, Oral, Q6H  enoxaparin, 40 mg, Subcutaneous, Daily  famotidine, 20 mg, Oral, BID  gabapentin, 300 mg, Oral, TID        dextrose 5 % and sodium chloride 0.45 % with KCl 20 mEq/L, 100 mL/hr, Last Rate: 100 mL/hr (06/01/23 2140)    Objective     Vital Signs:  Temp:  [98.4 °F (36.9 °C)-98.8 °F (37.1 °C)] 98.8 °F (37.1 °C)  Heart Rate:  [66-74] 72  Resp:  [18] 18  BP: (122-148)/(68-69) 130/69    Intake/Output Summary (Last 24 hours) at 6/3/2023 0955  Last data filed at 6/3/2023 0942  Gross per 24 hour   Intake 900 ml   Output 3550 ml   Net -2650 ml       Physical Exam  Constitutional:       Appearance: Normal appearance.   HENT:      Head: Normocephalic and atraumatic.   Cardiovascular:      Rate and Rhythm: Normal rate and regular rhythm.   Pulmonary:      Effort: Pulmonary effort is normal. No respiratory distress.   Abdominal:      Palpations: Abdomen is soft.      Comments: Appropriately tender to palpation, incisions are clean/dry/intact   Neurological:      General: No focal deficit present.      Mental Status: She is alert and oriented to person, place, and time.   Psychiatric:         Mood and Affect: Mood normal.         Behavior: Behavior normal.       Results Review:    Results from last 7 days   Lab Units 06/02/23  0528   SODIUM mmol/L 137   POTASSIUM mmol/L 3.7   CHLORIDE mmol/L 104   CO2 mmol/L 23.0   BUN mg/dL 6*   CREATININE mg/dL 0.53*   GLUCOSE mg/dL 116*   CALCIUM mg/dL 9.0     Results from last 7 days   Lab Units 06/02/23  0528   WBC 10*3/mm3 11.74*   HEMOGLOBIN g/dL 10.5*   HEMATOCRIT % 30.4*   PLATELETS 10*3/mm3 190       Assessment:    Tubular adenoma of colon      Postop day 2 from laparoscopic right colectomy    Plan:    Patient would like to  wait until tomorrow to go home due to social situation.  Encourage ambulation today        This document has been electronically signed by Dimas Pompa MD on Sonia 3, 2023 09:55 CDT

## 2023-06-03 NOTE — PLAN OF CARE
Goal Outcome Evaluation:  Plan of Care Reviewed With: patient        Progress: improving  Outcome Evaluation: Pt sup in bed and agreeable tot herapy. Pt t/f sup to sit to sup with spv. Pt stood with CGA and amb with RW and CGA this pm due to pt c/o increase dizziness with tx. Pt able to amb 312ft with RW and CGA with c/o dizziness after each gait trial. Pt VS assessed after each trial and O2 sats ranged from 88% to 91% after each gait which pt was able to recover very quickly with seated rest. Pt also noted to have elevated BP with gait but again recovered quickly with rest. Pt performed B LE ther ex in sitting without c/o and was able to return to sup position in bed. Pt would cont to benefit from therapy at this time.

## 2023-06-03 NOTE — PLAN OF CARE
Goal Outcome Evaluation:  Plan of Care Reviewed With: patient        Progress: improving  Outcome Evaluation: Pt agreeable to therapy. Pt was able to t/f sup to sit with spv using bed rail and HOB elevated. Pt stood with CGA and amb with no AD for 246ft and 312ft with seated rest in between. Pt able to perform B LE ther ex in between gait trials. Pt desatted with gait to 89% but quickly recovered with seated rest. Pt returned to room and to sup with same assist level and was left with all needs met. Pt would cont to benefit fromt herapy at this time.

## 2023-06-03 NOTE — THERAPY TREATMENT NOTE
Patient Name: Jo Ann Lacy  : 1960    MRN: [id number]                              Today's Date: 6/3/2023       Admit Date: 2023    Visit Dx:     ICD-10-CM ICD-9-CM   1. Tubular adenoma of colon  D12.6 211.3   2. Impaired functional mobility, balance, gait, and endurance  Z74.09 V49.89   3. Impaired mobility and ADLs  Z74.09 V49.89    Z78.9      Patient Active Problem List   Diagnosis    Tubular adenoma of colon    Villous adenoma of colon    Benign polyp of large intestine    Essential hypertension    Vitamin D deficiency    Hypertriglyceridemia    Hypercholesteremia    Acute bilateral knee pain    Dyslipidemia    Stress    Anxiety    History of colon polyps     Past Medical History:   Diagnosis Date    Arthritis     Benign polyp of large intestine     Elevated cholesterol     Essential hypertension     Gastroesophageal reflux disease     History of transfusion     Hypertension     Postmenopausal state     Tubular adenoma of colon     Villous adenoma of colon     1.6cm VTA with intermediate grade dysplasia transverse.  1.0cm VTA with intermediate grade dysplasia ascending    Vitamin D deficiency      Past Surgical History:   Procedure Laterality Date    COLONOSCOPY  2016    Many 4-5 mm polyps in the transverse colon and in the ascending colon.Resected and retrieved.External and internal hemorrhoids    COLONOSCOPY N/A 03/10/2023    Procedure: COLONOSCOPY;  Surgeon: Last Miranda MD;  Location: [facility] ENDOSCOPY;  Service: Gastroenterology;  Laterality: N/A;    ECTOPIC PREGNANCY SURGERY      KNEE SURGERY Left     TOTAL ABDOMINAL HYSTERECTOMY WITH SALPINGO OOPHORECTOMY        General Information       Row Name 23 1542          OT Time and Intention    Document Type therapy note (daily note)  -LW     Mode of Treatment individual therapy;occupational therapy  -LW       Row Name 23 1542          General Information    Patient Profile Reviewed yes  -LW     Existing  Precautions/Restrictions fall  -       Row Name 06/03/23 1542          Cognition    Orientation Status (Cognition) oriented x 4  -       Row Name 06/03/23 1542          Safety Issues, Functional Mobility    Impairments Affecting Function (Mobility) endurance/activity tolerance;pain;strength  -               User Key  (r) = Recorded By, (t) = Taken By, (c) = Cosigned By      Initials Name Provider Type     Cherise Garza COTA Occupational Therapist Assistant                     Mobility/ADL's       Row Name 06/03/23 1542          Bed Mobility    Bed Mobility supine-sit;sit-supine  -     Supine-Sit [place] (Bed Mobility) standby assist  -     Sit-Supine [place] (Bed Mobility) standby assist  -     Assistive Device (Bed Mobility) bed rails;head of bed elevated  -       Row Name 06/03/23 1542          Transfers    Transfers sit-stand transfer;stand-sit transfer;toilet transfer  -       Row Name 06/03/23 1542          Sit-Stand Transfer    Sit-Stand [place] (Transfers) standby assist  -     Assistive Device (Sit-Stand Transfers) walker, front-wheeled  -       Row Name 06/03/23 1542          Stand-Sit Transfer    Stand-Sit [place] (Transfers) standby assist  -     Assistive Device (Stand-Sit Transfers) walker, front-wheeled  -       Row Name 06/03/23 1542          Toilet Transfer    Type (Toilet Transfer) sit-stand;stand-sit  -     [place] Level (Toilet Transfer) standby assist  -     Assistive Device (Toilet Transfer) commode;walker, front-wheeled  -       Row Name 06/03/23 1542          Activities of Daily Living    BADL Assessment/Intervention bathing;upper body dressing;lower body dressing;grooming;toileting  -       Row Name 06/03/23 1542          Bathing Assessment/Intervention    [place] Level (Bathing) bathing skills;lower body;upper body;upper extremities;modified independence  -     Position (Bathing) edge of bed sitting  -       Row Name  06/03/23 1542          Upper Body Dressing Assessment/Training    [place] Level (Upper Body Dressing) upper body dressing skills;doff;don;pull-over garment;modified independence  -LW     Position (Upper Body Dressing) edge of bed sitting  -LW       Row Name 06/03/23 1542          Lower Body Dressing Assessment/Training    [place] Level (Lower Body Dressing) lower body dressing skills;doff;don;pants/bottoms;socks;modified independence  -LW     Position (Lower Body Dressing) edge of bed sitting  -LW       Row Name 06/03/23 1542          Grooming Assessment/Training    [place] Level (Grooming) oral care regimen;wash face, hands;modified independence  -LW     Position (Grooming) edge of bed sitting  -LW       Row Name 06/03/23 1542          Toileting Assessment/Training    [place] Level (Toileting) toileting skills;adjust/manage clothing;perform perineal hygiene;modified independence  -LW     Assistive Devices (Toileting) commode;grab bar/safety frame  -LW     Position (Toileting) unsupported sitting  -LW               User Key  (r) = Recorded By, (t) = Taken By, (c) = Cosigned By      Initials Name Provider Type    LW Cherise Garza COTA Occupational Therapist Assistant                   Obj/Interventions    No documentation.                  Goals/Plan       Row Name 06/03/23 1305 06/03/23 1145       Transfer Goal 1 (OT)    Activity/Assistive Device (Transfer Goal 1, OT) transfers, all  -LW transfers, all  -LW    [place] Level/Cues Needed (Transfer Goal 1, OT) modified independence  -LW modified independence  -LW    Time Frame (Transfer Goal 1, OT) long term goal (LTG);by discharge  -LW long term goal (LTG);by discharge  -LW    Progress/Outcome (Transfer Goal 1, OT) goal not met  -LW goal not met  -LW      Row Name 06/03/23 1305 06/03/23 1145       Dressing Goal 1 (OT)    Activity/Device (Dressing Goal 1, OT) dressing skills, all  -LW dressing skills, all  -LW    [place]/Cues Needed  (Dressing Goal 1, OT) independent  -LW independent  -LW    Time Frame (Dressing Goal 1, OT) long term goal (LTG);by discharge  -LW long term goal (LTG);by discharge  -LW    Progress/Outcome (Dressing Goal 1, OT) goal not met  -LW goal not met  -LW      Row Name 06/03/23 1305 06/03/23 1145       Toileting Goal 1 (OT)    Activity/Device (Toileting Goal 1, OT) toileting skills, all  -LW toileting skills, all  -LW    [place] Level/Cues Needed (Toileting Goal 1, OT) independent  -LW independent  -LW    Time Frame (Toileting Goal 1, OT) long term goal (LTG);by discharge  -LW long term goal (LTG);by discharge  -LW    Progress/Outcome (Toileting Goal 1, OT) goal met  -LW goal met   -LW      Row Name 06/03/23 1305 06/03/23 1145       Problem Specific Goal 1 (OT)    Problem Specific Goal 1 (OT) Pt will tolerate participating in OOB fxnl activities for ~25 minutes to improve activity tolerance.  -LW Pt will tolerate participating in OOB fxnl activities for ~25 minutes to improve activity tolerance.  -LW    Time Frame (Problem Specific Goal 1, OT) long term goal (LTG);by discharge  -LW long term goal (LTG);by discharge  -LW    Progress/Outcome (Problem Specific Goal 1, OT) goal not met  -LW goal not met  -LW              User Key  (r) = Recorded By, (t) = Taken By, (c) = Cosigned By      Initials Name Provider Type    LW Cherise Garza COTA Occupational Therapist Assistant                   Clinical Impression       Row Name 06/03/23 1555 06/03/23 1546       Pain Assessment    Pretreatment Pain Rating 2/10  -LW 3/10  -LW    Posttreatment Pain Rating -- 3/10  -LW    Pain Location - Side/Orientation Right  -LW Right  -LW    Pain Location lower  -LW lower  -LW    Pain Location - abdomen  -LW abdomen  -LW      Row Name 06/03/23 1555 06/03/23 1546       Plan of Care Review    Plan of Care Reviewed With patient  -LW patient  -LW    Progress improving  -LW improving  -LW    Outcome Evaluation Pt supine in bed. Just completing  PT. Educated patient on Home safety and EC/WS.  -LW Patient supine in bed. Supine to sit<>sit to supine SBA. T/f to toilet SBA. Toileting Independent. UB and LB bathing and dressing Mod I. Grooming Mod I. Patient pleasant and cooperative. Pt supine in bed all needs in reach. Pt would continue to benefit from OT services.  -LW      Row Name 06/03/23 8736          Positioning and Restraints    Pre-Treatment Position in bed  -LW     Post Treatment Position bed  -LW     In Bed supine;call light within reach;encouraged to call for assist;exit alarm on  -LW               User Key  (r) = Recorded By, (t) = Taken By, (c) = Cosigned By      Initials Name Provider Type    LW Cherise Garza COTA Occupational Therapist Assistant                   Outcome Measures       Row Name 06/03/23 5149          How much help from another is currently needed...    Putting on and taking off regular lower body clothing? 4  -LW     Bathing (including washing, rinsing, and drying) 4  -LW     Toileting (which includes using toilet bed pan or urinal) 4  -LW     Putting on and taking off regular upper body clothing 4  -LW     Taking care of personal grooming (such as brushing teeth) 4  -LW     Eating meals 4  -LW     AM-PAC 6 Clicks Score (OT) 24  -LW       Row Name 06/03/23 0846          How much help from another person do you currently need...    Turning from your back to your side while in flat bed without using bedrails? 4  -LR     Moving from lying on back to sitting on the side of a flat bed without bedrails? 4  -LR     Moving to and from a bed to a chair (including a wheelchair)? 3  -LR     Standing up from a chair using your arms (e.g., wheelchair, bedside chair)? 3  -LR     Climbing 3-5 steps with a railing? 3  -LR     To walk in hospital room? 3  -LR     AM-PAC 6 Clicks Score (PT) 20  -LR     Highest level of mobility 6 --> Walked 10 steps or more  -LR               User Key  (r) = Recorded By, (t) = Taken By, (c) = Cosigned By       Initials Name Provider Type    Dana Forbes RN Registered Nurse    Cherise Weaver COTA Occupational Therapist Assistant                    Occupational Therapy Education       Title: PT OT SLP Therapies (In Progress)       Topic: Occupational Therapy (Done)       Point: ADL training (Done)       Description:   Instruct learner(s) on proper safety adaptation and remediation techniques during self care or transfers.   Instruct in proper use of assistive devices.                  Learning Progress Summary             Patient Acceptance, E,TB,H, VU,DU by  at 6/3/2023 1557    Comment: Educated patient on Home safety and EC/WS    Acceptance, E,TB, VU by LW at 6/3/2023 1553    Acceptance, E, VU,NR by  at 6/2/2023 1159    Acceptance, E, VU,NR by  at 6/2/2023 1109    Acceptance, E,TB, VU by  at 6/1/2023 1531    Comment: OT role, POC, t/f training                         Point: Home exercise program (Done)       Description:   Instruct learner(s) on appropriate technique for monitoring, assisting and/or progressing therapeutic exercises/activities.                  Learning Progress Summary             Patient Acceptance, E,TB,H, VU,DU by  at 6/3/2023 1557    Comment: Educated patient on Home safety and EC/WS    Acceptance, E,TB, VU by  at 6/3/2023 1553                         Point: Precautions (Done)       Description:   Instruct learner(s) on prescribed precautions during self-care and functional transfers.                  Learning Progress Summary             Patient Acceptance, E,TB,H, VU,DU by  at 6/3/2023 1557    Comment: Educated patient on Home safety and EC/WS    Acceptance, E,TB, VU by  at 6/3/2023 1553    Acceptance, E, VU,NR by  at 6/2/2023 1159    Acceptance, E, VU,NR by  at 6/2/2023 1109    Acceptance, E,TB, VU by  at 6/1/2023 1531    Comment: OT role, POC, t/f training                         Point: Body mechanics (Done)       Description:   Instruct learner(s) on proper  positioning and spine alignment during self-care, functional mobility activities and/or exercises.                  Learning Progress Summary             Patient Acceptance, E,TB,H, VU,DU by  at 6/3/2023 1557    Comment: Educated patient on Home safety and EC/WS    Acceptance, E,TB, VU by  at 6/3/2023 1553    Acceptance, E, VU,NR by  at 6/2/2023 1159    Acceptance, E, VU,NR by  at 6/2/2023 1109    Acceptance, E,TB, VU by  at 6/1/2023 1531    Comment: OT role, POC, t/f training                                         User Key       Initials Effective Dates Name Provider Type Discipline     06/16/21 -  Amber Abdalla COTA Occupational Therapist Assistant OT     06/16/21 -  Cherise Garza COTA Occupational Therapist Assistant OT     10/19/22 -  Destiney Henriquez OT Occupational Therapist OT                  OT Recommendation and Plan     Plan of Care Review  Plan of Care Reviewed With: patient  Progress: improving  Outcome Evaluation: Pt supine in bed. Just completing PT. Educated patient on Home safety and EC/WS.     Time Calculation:    Time Calculation- OT       Row Name 06/03/23 1558 06/03/23 1553          Time Calculation- OT    OT Start Time 1305  -LW 1145  -LW     OT Stop Time 1330  -LW 1240  -LW     OT Time Calculation (min) 25 min  -LW 55 min  -LW     Total Timed Code Minutes- OT 25 minute(s)  -LW 55 minute(s)  -LW     OT Received On 06/03/23  -LW 06/03/23  -LW        Timed Charges    98852 - OT Self Care/Mgmt Minutes 25  -LW 55  -LW        Total Minutes    Timed Charges Total Minutes 25  -LW 55  -LW      Total Minutes 25  -LW 55  -LW               User Key  (r) = Recorded By, (t) = Taken By, (c) = Cosigned By      Initials Name Provider Type    LW Cherise Garza COTA Occupational Therapist Assistant                  Therapy Charges for Today       Code Description Service Date Service Provider Modifiers Qty    [id number]  OT SELF CARE/MGMT/TRAIN EA 15 MIN 6/3/2023 Cherise Garza  PEGGY MCLEOD GO 4    [id number] HC OT SELF CARE/MGMT/TRAIN EA 15 MIN 6/3/2023 Cherise Garza COTA GO 2                 PEGGY Gifford  6/3/2023

## 2023-06-04 ENCOUNTER — READMISSION MANAGEMENT (OUTPATIENT)
Dept: CALL CENTER | Facility: HOSPITAL | Age: 63
End: 2023-06-04

## 2023-06-04 VITALS
OXYGEN SATURATION: 92 % | WEIGHT: 134 LBS | TEMPERATURE: 98.6 F | BODY MASS INDEX: 23.74 KG/M2 | RESPIRATION RATE: 18 BRPM | DIASTOLIC BLOOD PRESSURE: 77 MMHG | SYSTOLIC BLOOD PRESSURE: 150 MMHG | HEART RATE: 74 BPM | HEIGHT: 63 IN

## 2023-06-04 PROCEDURE — 97530 THERAPEUTIC ACTIVITIES: CPT

## 2023-06-04 PROCEDURE — 97110 THERAPEUTIC EXERCISES: CPT

## 2023-06-04 PROCEDURE — 99024 POSTOP FOLLOW-UP VISIT: CPT | Performed by: SURGERY

## 2023-06-04 PROCEDURE — 97535 SELF CARE MNGMENT TRAINING: CPT

## 2023-06-04 PROCEDURE — 97116 GAIT TRAINING THERAPY: CPT

## 2023-06-04 RX ORDER — OXYCODONE HYDROCHLORIDE 5 MG/1
5 TABLET ORAL EVERY 4 HOURS PRN
Qty: 10 TABLET | Refills: 0 | Status: SHIPPED | OUTPATIENT
Start: 2023-06-04 | End: 2023-06-11

## 2023-06-04 RX ADMIN — OXYCODONE HYDROCHLORIDE 5 MG: 5 TABLET ORAL at 03:25

## 2023-06-04 RX ADMIN — GABAPENTIN 300 MG: 300 CAPSULE ORAL at 09:49

## 2023-06-04 RX ADMIN — FAMOTIDINE 20 MG: 20 TABLET ORAL at 09:49

## 2023-06-04 NOTE — THERAPY TREATMENT NOTE
Acute Care - Physical Therapy Treatment Note  [facility]     Patient Name: Jo Ann Lacy  : 1960  MRN: [id number]  Today's Date: 2023      Visit Dx:     ICD-10-CM ICD-9-CM   1. Tubular adenoma of colon  D12.6 211.3   2. Impaired functional mobility, balance, gait, and endurance  Z74.09 V49.89   3. Impaired mobility and ADLs  Z74.09 V49.89    Z78.9      Patient Active Problem List   Diagnosis    Tubular adenoma of colon    Villous adenoma of colon    Benign polyp of large intestine    Essential hypertension    Vitamin D deficiency    Hypertriglyceridemia    Hypercholesteremia    Acute bilateral knee pain    Dyslipidemia    Stress    Anxiety    History of colon polyps     Past Medical History:   Diagnosis Date    Arthritis     Benign polyp of large intestine     Elevated cholesterol     Essential hypertension     Gastroesophageal reflux disease     History of transfusion     Hypertension     Postmenopausal state     Tubular adenoma of colon     Villous adenoma of colon     1.6cm VTA with intermediate grade dysplasia transverse.  1.0cm VTA with intermediate grade dysplasia ascending    Vitamin D deficiency      Past Surgical History:   Procedure Laterality Date    COLONOSCOPY  2016    Many 4-5 mm polyps in the transverse colon and in the ascending colon.Resected and retrieved.External and internal hemorrhoids    COLONOSCOPY N/A 03/10/2023    Procedure: COLONOSCOPY;  Surgeon: Last Miranda MD;  Location: [facility] ENDOSCOPY;  Service: Gastroenterology;  Laterality: N/A;    ECTOPIC PREGNANCY SURGERY      KNEE SURGERY Left     TOTAL ABDOMINAL HYSTERECTOMY WITH SALPINGO OOPHORECTOMY       PT Assessment (last 12 hours)       PT Evaluation and Treatment       Row Name 23 1434          Physical Therapy Time and Intention    Subjective Information complains of;weakness;pain  -TW     Document Type therapy note (daily note)  -TW     Mode of Treatment physical therapy;individual therapy  -TW      Patient Effort good  -TW       Row Name 06/04/23 1434          General Information    Patient Profile Reviewed yes  -TW     Patient Observations alert;cooperative;agree to therapy  -TW     Patient/Family/Caregiver Comments/Observations none  -TW     General Observations of Patient Pt sup in bed.  -TW     Existing Precautions/Restrictions fall  -TW       Row Name 06/04/23 1434          Pain    Pretreatment Pain Rating 2/10  -TW     Posttreatment Pain Rating 2/10  -TW     Pain Location - Side/Orientation Right  -TW     Pain Location - flank  -TW       Row Name 06/04/23 1434          Cognition    Affect/Mental Status (Cognition) WFL  -TW     Orientation Status (Cognition) oriented x 4  -TW     Follows Commands (Cognition) WFL  -TW     Personal Safety Interventions fall prevention program maintained;nonskid shoes/slippers when out of bed  -TW       Row Name 06/04/23 1434          Bed Mobility    Supine-Sit [place] (Bed Mobility) modified independence  -TW     Sit-Supine [place] (Bed Mobility) modified independence  -TW     Assistive Device (Bed Mobility) head of bed elevated  -TW       Row Name 06/04/23 1434          Sit-Stand Transfer    Sit-Stand [place] (Transfers) supervision  -TW     Assistive Device (Sit-Stand Transfers) other (see comments)  No AD used.  -TW       Row Name 06/04/23 1434          Stand-Sit Transfer    Stand-Sit [place] (Transfers) supervision  -TW     Assistive Device (Stand-Sit Transfers) other (see comments)  No AD used  -       Row Name 06/04/23 1434          Gait/Stairs (Locomotion)    [place] Level (Gait) standby assist  -TW     Assistive Device (Gait) other (see comments)  No AD used.  -TW     Distance in Feet (Gait) 312ft x2  -TW       Row Name 06/04/23 1434          Hip (Therapeutic Exercise)    Hip AROM (Therapeutic Exercise) bilateral;sitting  -TW       Row Name 06/04/23 1434          Knee (Therapeutic Exercise)    Knee AROM (Therapeutic Exercise)  bilateral;sitting  -TW       Row Name 06/04/23 1434          Ankle (Therapeutic Exercise)    Ankle AROM (Therapeutic Exercise) bilateral;sitting  -TW       Row Name             Wound 06/01/23 0756 abdomen Incision    Wound - Properties Group Placement Date: 06/01/23  - Placement Time: 0756 -HC Location: abdomen  -HC Primary Wound Type: Incision  -HC    Retired Wound - Properties Group Placement Date: 06/01/23  - Placement Time: 0756  -HC Location: abdomen  -HC Primary Wound Type: Incision  -HC    Retired Wound - Properties Group Date first assessed: 06/01/23  - Time first assessed: 0756 -HC Location: abdomen  -HC Primary Wound Type: Incision  -HC      Row Name 06/04/23 1434          Plan of Care Review    Plan of Care Reviewed With patient  -TW     Progress improving  -TW     Outcome Evaluation Pt sup in bed and agreeable to therapy. Pt able to t/f to sitting with mod ind with HOB elevated. Pt able to stand ind without AD and amb 312ft x2 with no AD used with occassional unsteadiness but no LOB noted. Pt performed seated B LE ther ex in between gait trials with good tech observed and good tolerance noted. Pt returned to room and to sup in bed with all needs met. Pt with slightly elevated BP after gait. Nsg aware. Pt would cont to benefit from therapy at this time.  -TW       Row Name 06/04/23 1434          Vital Signs    Pre Systolic BP Rehab 151  -TW     Pre Treatment Diastolic BP 73  -TW     Intra Systolic BP Rehab 165  -TW     Intra Treatment Diastolic BP 86  -TW     Post Systolic BP Rehab 160  -TW     Post Treatment Diastolic BP 80  -TW     Pretreatment Heart Rate (beats/min) 82  -TW     Intratreatment Heart Rate (beats/min) 94  -TW     Posttreatment Heart Rate (beats/min) 86  -TW     Pre SpO2 (%) 91  -TW     O2 Delivery Pre Treatment room air  -TW     Intra SpO2 (%) 90  -TW     O2 Delivery Intra Treatment room air  -TW     Post SpO2 (%) 94  -TW     O2 Delivery Post Treatment room air  -TW     Pre  Patient Position Supine  -TW     Intra Patient Position Standing  -TW     Post Patient Position Supine  -TW       Row Name 06/04/23 1434          Positioning and Restraints    Pre-Treatment Position in bed  -TW     Post Treatment Position bed  -TW     In Bed supine;call light within reach;encouraged to call for assist;exit alarm on  -TW       Row Name 06/04/23 1434          Therapy Assessment/Plan (PT)    Rehab Potential (PT) good, to achieve stated therapy goals  -TW       Row Name 06/04/23 1434          Bed Mobility Goal 1 (PT)    Activity/Assistive Device (Bed Mobility Goal 1, PT) bed mobility activities, all  -TW     [place] Level/Cues Needed (Bed Mobility Goal 1, PT) modified independence  -TW     Time Frame (Bed Mobility Goal 1, PT) by discharge  -TW     Progress/Outcomes (Bed Mobility Goal 1, PT) goal met   -TW       Row Name 06/04/23 1434          Transfer Goal 1 (PT)    Activity/Assistive Device (Transfer Goal 1, PT) sit-to-stand/stand-to-sit;bed-to-chair/chair-to-bed  -TW     [place] Level/Cues Needed (Transfer Goal 1, PT) modified independence  -TW     Time Frame (Transfer Goal 1, PT) by discharge  -TW     Progress/Outcome (Transfer Goal 1, PT) goal met   -TW       Row Name 06/04/23 1434          Gait Training Goal 1 (PT)    Activity/Assistive Device (Gait Training Goal 1, PT) gait (walking locomotion);increase endurance/gait distance  -TW     [place] Level (Gait Training Goal 1, PT) modified independence  -TW     Distance (Gait Training Goal 1, PT) 25' x 2  -TW     Time Frame (Gait Training Goal 1, PT) by discharge  -TW     Progress/Outcome (Gait Training Goal 1, PT) goal met   -TW       Row Name 06/04/23 1434          Problem Specific Goal 1 (PT)    Problem Specific Goal 1 (PT) Improve LE strength to 4/5  -TW     Time Frame (Problem Specific Goal 1, PT) by discharge  -TW     Progress/Outcome (Problem Specific Goal 1, PT) goal not met  -TW               User Key  (r) = Recorded By, (t) =  Taken By, (c) = Cosigned By      Initials Name Provider Type    TW Sim Hernandez, PTA Physical Therapist Assistant    Jeanne Tam, RN Registered Nurse                    Physical Therapy Education       Title: PT OT SLP Therapies (In Progress)       Topic: Physical Therapy (In Progress)       Point: Mobility training (Done)       Learning Progress Summary             Patient Acceptance, E, VU,NR by  at 6/1/2023 1308    Comment: POC and goals                         Point: Home exercise program (Not Started)       Learner Progress:  Not documented in this visit.              Point: Body mechanics (Not Started)       Learner Progress:  Not documented in this visit.              Point: Precautions (Not Started)       Learner Progress:  Not documented in this visit.                              User Key       Initials Effective Dates Name Provider Type Discipline     01/09/23 -  Jeanne Dotson, LESLIE Physical Therapist PT                  PT Recommendation and Plan  Anticipated Discharge Disposition (PT): home with 24/7 care, home with home health, LTCH (long-term care hospital), skilled nursing facility  Plan of Care Reviewed With: patient  Progress: improving  Outcome Evaluation: Pt sup in bed and agreeable to therapy. Pt able to t/f to sitting with mod ind with HOB elevated. Pt able to stand ind without AD and amb 312ft x2 with no AD used with occassional unsteadiness but no LOB noted. Pt performed seated B LE ther ex in between gait trials with good tech observed and good tolerance noted. Pt returned to room and to sup in bed with all needs met. Pt with slightly elevated BP after gait. Nsg aware. Pt would cont to benefit from therapy at this time.       Time Calculation:    PT Charges       Row Name 06/04/23 1629             Time Calculation    Start Time 1434  -TW      Stop Time 1513  -TW      Time Calculation (min) 39 min  -TW         Time Calculation- PT    Total Timed Code Minutes- PT 39  minute(s)  -TW                User Key  (r) = Recorded By, (t) = Taken By, (c) = Cosigned By      Initials Name Provider Type     Sim Hernandez PTA Physical Therapist Assistant                  Therapy Charges for Today       Code Description Service Date Service Provider Modifiers Qty    93901856395 HC GAIT TRAINING EA 15 MIN 6/3/2023 Sim Hernandez, PTA GP 1    90592198389 HC PT THERAPEUTIC ACT EA 15 MIN 6/3/2023 Sim Hernandez, PTA GP 1    51436847057 HC PT THER PROC EA 15 MIN 6/3/2023 Sim Hernandez, PTA GP 1    78307999567 HC GAIT TRAINING EA 15 MIN 6/3/2023 Sim Hernandez, PTA GP 1    37376507292 HC PT THER PROC EA 15 MIN 6/3/2023 Sim Hernandez, PTA GP 1    92694228021 HC PT THERAPEUTIC ACT EA 15 MIN 6/3/2023 Sim Hernandez, PTA GP 2    17327289926 HC PT THER SUPP EA 15 MIN 6/4/2023 Sim Hernandez, PTA GP 1    34097254050 HC PT THER SUPP EA 15 MIN 6/4/2023 Sim Hernandez, PTA GP 1    33247251428 HC GAIT TRAINING EA 15 MIN 6/4/2023 Sim Hernandez, PTA GP 1    12159302522 HC PT THER PROC EA 15 MIN 6/4/2023 Sim Hernandez, PTA GP 1    22512151954 HC PT THERAPEUTIC ACT EA 15 MIN 6/4/2023 Sim Hernandez, PTA GP 1            PT G-Codes  Outcome Measure Options: AM-PAC 6 Clicks Daily Activity (OT)  AM-PAC 6 Clicks Score (PT): 23  AM-PAC 6 Clicks Score (OT): 24    Sim Hernandez PTA  6/4/2023

## 2023-06-04 NOTE — SIGNIFICANT NOTE
06/04/23 1109   OTHER   Discipline physical therapy assistant   Rehab Time/Intention   Session Not Performed patient/family declined treatment  (Pt just recieved her lunch and wants to eat before being tx for therapy. PTA will check back.)

## 2023-06-04 NOTE — PLAN OF CARE
Problem: Adult Inpatient Plan of Care  Goal: Plan of Care Review  Recent Flowsheet Documentation  Taken 6/4/2023 1428 by Cherise Garza COTA  Progress: improving  Plan of Care Reviewed With: patient  Outcome Evaluation: Patient supine in bed. Patient needing increased time for tasks for safety. Supine to sit<>sit to supine Independent. Sit to stand<>stand to sit Independent. T/f to commode Independent no R/W. Pt stood sink side while performing grooming with [place] for ~10 minutes. Pt ambulated in hallway with walker over 350 feet. Pt supine in bed all needs in reach.   Goal Outcome Evaluation:  Plan of Care Reviewed With: patient        Progress: improving  Outcome Evaluation: Patient supine in bed. Patient needing increased time for tasks for safety. Supine to sit<>sit to supine Independent. Sit to stand<>stand to sit Independent. T/f to commode Independent no R/W. Pt stood sink side while performing grooming with [place] for ~10 minutes. Pt ambulated in hallway with walker over 350 feet. Pt supine in bed all needs in reach.

## 2023-06-04 NOTE — THERAPY TREATMENT NOTE
Patient Name: Jo Ann Lacy  : 1960    MRN: [id number]                              Today's Date: 2023       Admit Date: 2023    Visit Dx:     ICD-10-CM ICD-9-CM   1. Tubular adenoma of colon  D12.6 211.3   2. Impaired functional mobility, balance, gait, and endurance  Z74.09 V49.89   3. Impaired mobility and ADLs  Z74.09 V49.89    Z78.9      Patient Active Problem List   Diagnosis    Tubular adenoma of colon    Villous adenoma of colon    Benign polyp of large intestine    Essential hypertension    Vitamin D deficiency    Hypertriglyceridemia    Hypercholesteremia    Acute bilateral knee pain    Dyslipidemia    Stress    Anxiety    History of colon polyps     Past Medical History:   Diagnosis Date    Arthritis     Benign polyp of large intestine     Elevated cholesterol     Essential hypertension     Gastroesophageal reflux disease     History of transfusion     Hypertension     Postmenopausal state     Tubular adenoma of colon     Villous adenoma of colon     1.6cm VTA with intermediate grade dysplasia transverse.  1.0cm VTA with intermediate grade dysplasia ascending    Vitamin D deficiency      Past Surgical History:   Procedure Laterality Date    COLONOSCOPY  2016    Many 4-5 mm polyps in the transverse colon and in the ascending colon.Resected and retrieved.External and internal hemorrhoids    COLONOSCOPY N/A 03/10/2023    Procedure: COLONOSCOPY;  Surgeon: Last Miranda MD;  Location: [facility] ENDOSCOPY;  Service: Gastroenterology;  Laterality: N/A;    ECTOPIC PREGNANCY SURGERY      KNEE SURGERY Left     TOTAL ABDOMINAL HYSTERECTOMY WITH SALPINGO OOPHORECTOMY        General Information       Row Name 23 1420          OT Time and Intention    Document Type therapy note (daily note)  -LW     Mode of Treatment individual therapy;occupational therapy  -LW       Row Name 23 1420          General Information    Patient Profile Reviewed yes  -LW     Existing  Precautions/Restrictions fall  -       Row Name 06/04/23 1420          Cognition    Orientation Status (Cognition) oriented x 4  -       Row Name 06/04/23 1420          Safety Issues, Functional Mobility    Impairments Affecting Function (Mobility) endurance/activity tolerance;pain;strength  -               User Key  (r) = Recorded By, (t) = Taken By, (c) = Cosigned By      Initials Name Provider Type     Cherise Garza COTA Occupational Therapist Assistant                     Mobility/ADL's       Row Name 06/04/23 1420          Bed Mobility    Bed Mobility supine-sit;sit-supine  -     Supine-Sit [place] (Bed Mobility) independent  -LW     Sit-Supine [place] (Bed Mobility) independent  -     Assistive Device (Bed Mobility) head of bed elevated  -[facility] Name 06/04/23 1420          Transfers    Transfers sit-stand transfer;stand-sit transfer;toilet transfer  -[facility] Name 06/04/23 1420          Sit-Stand Transfer    Sit-Stand [place] (Transfers) independent  -     Assistive Device (Sit-Stand Transfers) other (see comments)  No AD  -[facility] Name 06/04/23 1420          Stand-Sit Transfer    Stand-Sit [place] (Transfers) independent  -     Assistive Device (Stand-Sit Transfers) other (see comments)  No AD  -[facility] Name 06/04/23 1420          Toilet Transfer    Type (Toilet Transfer) sit-stand;stand-sit  -     [place] Level (Toilet Transfer) independent  -     Assistive Device (Toilet Transfer) commode  -       Row Name 06/04/23 1420          Functional Mobility    Functional Mobility- Ind. Level conditional independence  -     Functional Mobility- Device walker, front-wheeled  -     Functional Mobility-Distance (Feet) 350  -       Row Name 06/04/23 1420          Activities of Daily Living    BADL Assessment/Intervention grooming;toileting;upper body dressing  -[facility] Name 06/04/23 1420          Bathing Assessment/Intervention    Comment,  (Bathing) Pt stated she already had a bath.  -       Row Name 06/04/23 1420          Upper Body Dressing Assessment/Training    [place] Level (Upper Body Dressing) upper body dressing skills;don;independent  Hospital gown  -     Position (Upper Body Dressing) supported standing  -       Row Name 06/04/23 1420          Grooming Assessment/Training    [place] Level (Grooming) grooming skills;hair care, combing/brushing;oral care regimen;wash face, hands;independent  -     Position (Grooming) sink side  -     Comment, (Grooming) Patient stood sink side while completing tasks with [place] for ~10 mins  -       Row Name 06/04/23 1420          Toileting Assessment/Training    [place] Level (Toileting) toileting skills;adjust/manage clothing;perform perineal hygiene;independent  -     Assistive Devices (Toileting) commode;grab bar/safety frame  -     Position (Toileting) unsupported sitting  -               User Key  (r) = Recorded By, (t) = Taken By, (c) = Cosigned By      Initials Name Provider Type     Cherise Garza COTA Occupational Therapist Assistant                   Obj/Interventions       Row Name 06/04/23 1436          Shoulder (Therapeutic Exercise)    Shoulder AROM (Therapeutic Exercise) bilateral;flexion;extension;external rotation;internal rotation;10 repetitions;2 sets  -       Row Name 06/04/23 1436          Elbow/Forearm (Therapeutic Exercise)    Elbow/Forearm (Therapeutic Exercise) AROM (active range of motion)  -     Elbow/Forearm AROM (Therapeutic Exercise) bilateral;flexion;extension;supination;pronation;10 repetitions;2 sets  -       Row Name 06/04/23 1436          Wrist (Therapeutic Exercise)    Wrist (Therapeutic Exercise) AROM (active range of motion)  -     Wrist AROM (Therapeutic Exercise) bilateral;10 repetitions;2 sets  -       Row Name 06/04/23 1436          Hand (Therapeutic Exercise)    Hand (Therapeutic Exercise) AROM (active range of  motion)  -LW     Hand AROM/AAROM (Therapeutic Exercise) bilateral;finger flexion;finger extension;finger aBduction;finger aDduction;10 repetitions;2 sets  -       Row Name 06/04/23 1436          Motor Skills    Therapeutic Exercise shoulder;elbow/forearm;wrist;hand  -LW               User Key  (r) = Recorded By, (t) = Taken By, (c) = Cosigned By      Initials Name Provider Type     Cherise Garza COTA Occupational Therapist Assistant                   Goals/Plan       Row Name 06/04/23 0720          Transfer Goal 1 (OT)    Activity/Assistive Device (Transfer Goal 1, OT) transfers, all  -LW     [place] Level/Cues Needed (Transfer Goal 1, OT) modified independence  -LW     Time Frame (Transfer Goal 1, OT) long term goal (LTG);by discharge  -LW     Progress/Outcome (Transfer Goal 1, OT) goal met   -[facility] Name 06/04/23 0720          Dressing Goal 1 (OT)    Activity/Device (Dressing Goal 1, OT) dressing skills, all  -LW     [place]/Cues Needed (Dressing Goal 1, OT) independent  -LW     Time Frame (Dressing Goal 1, OT) long term goal (LTG);by discharge  -LW     Progress/Outcome (Dressing Goal 1, OT) goal not met  -[facility] Name 06/04/23 0720          Toileting Goal 1 (OT)    Activity/Device (Toileting Goal 1, OT) toileting skills, all  -LW     [place] Level/Cues Needed (Toileting Goal 1, OT) independent  -LW     Time Frame (Toileting Goal 1, OT) long term goal (LTG);by discharge  -LW     Progress/Outcome (Toileting Goal 1, OT) goal met  -[facility] Name 06/04/23 0720          Problem Specific Goal 1 (OT)    Problem Specific Goal 1 (OT) Pt will tolerate participating in OOB fxnl activities for ~25 minutes to improve activity tolerance.  -LW     Time Frame (Problem Specific Goal 1, OT) long term goal (LTG);by discharge  -LW     Progress/Outcome (Problem Specific Goal 1, OT) goal met   -LW               User Key  (r) = Recorded By, (t) = Taken By, (c) = Cosigned By      Initials Name  Provider Type    LW Cherise Garza COTA Occupational Therapist Assistant                   Clinical Impression       Row Name 06/04/23 1428          Pain Assessment    Pretreatment Pain Rating 3/10  -LW     Posttreatment Pain Rating 3/10  -LW     Pain Location - Side/Orientation Right  -LW     Pain Location upper  -LW     Pain Location - abdomen  -LW     Pain Intervention(s) Repositioned  -LW       Row Name 06/04/23 1428          Plan of Care Review    Plan of Care Reviewed With patient  -LW     Progress improving  -LW     Outcome Evaluation Patient supine in bed. UE ROM with 1 lb wt 10X2 completed with good tolerance. Patient needing increased time for tasks for safety. Supine to sit<>sit to supine Independent. Sit to stand<>stand to sit Independent. T/f to commode Independent no R/W. Pt stood sink side while performing grooming with [place] for ~10 minutes. Pt ambulated in hallway with walker over 350 feet. Pt supine in bed all needs in reach.  -LW       Row Name 06/04/23 1428          Positioning and Restraints    Pre-Treatment Position in bed  -LW     Post Treatment Position bed  -LW     In Bed call light within reach;encouraged to call for assist;exit alarm on  -LW               User Key  (r) = Recorded By, (t) = Taken By, (c) = Cosigned By      Initials Name Provider Type    LW Cherise Garza COTA Occupational Therapist Assistant                   Outcome Measures       Row Name 06/04/23 1433          How much help from another is currently needed...    Putting on and taking off regular lower body clothing? 4  -LW     Bathing (including washing, rinsing, and drying) 4  -LW     Toileting (which includes using toilet bed pan or urinal) 4  -LW     Putting on and taking off regular upper body clothing 4  -LW     Taking care of personal grooming (such as brushing teeth) 4  -LW     Eating meals 4  -LW     AM-PAC 6 Clicks Score (OT) 24  -LW       Row Name 06/04/23 1100          How much help from another  person do you currently need...    Turning from your back to your side while in flat bed without using bedrails? 4  -LR     Moving from lying on back to sitting on the side of a flat bed without bedrails? 4  -LR     Moving to and from a bed to a chair (including a wheelchair)? 4  -LR     Standing up from a chair using your arms (e.g., wheelchair, bedside chair)? 4  -LR     Climbing 3-5 steps with a railing? 3  -LR     To walk in hospital room? 4  -LR     AM-PAC 6 Clicks Score (PT) 23  -LR     Highest level of mobility 7 --> Walked 25 feet or more  -LR               User Key  (r) = Recorded By, (t) = Taken By, (c) = Cosigned By      Initials Name Provider Type    Dana Forbes, RN Registered Nurse    Cherise Weaver COTA Occupational Therapist Assistant                    Occupational Therapy Education       Title: PT OT SLP Therapies (In Progress)       Topic: Occupational Therapy (Done)       Point: ADL training (Done)       Description:   Instruct learner(s) on proper safety adaptation and remediation techniques during self care or transfers.   Instruct in proper use of assistive devices.                  Learning Progress Summary             Patient Acceptance, E,TB, VU by  at 6/4/2023 1433    Acceptance, E,TB,H, VU,DU by TOMA at 6/3/2023 1557    Comment: Educated patient on Home safety and EC/WS    Acceptance, E,TB, VU by  at 6/3/2023 1553    Acceptance, E, VU,NR by  at 6/2/2023 1159    Acceptance, E, VU,NR by  at 6/2/2023 1109    Acceptance, E,TB, VU by  at 6/1/2023 1531    Comment: OT role, POC, t/f training                         Point: Home exercise program (Done)       Description:   Instruct learner(s) on appropriate technique for monitoring, assisting and/or progressing therapeutic exercises/activities.                  Learning Progress Summary             Patient Acceptance, E,TB, VU by TOMA at 6/4/2023 1433    Acceptance, E,TB,H, VU,DU by TOMA at 6/3/2023 1557    Comment: Educated  patient on Home safety and EC/WS    Acceptance, E,TB, VU by LW at 6/3/2023 1553                         Point: Precautions (Done)       Description:   Instruct learner(s) on prescribed precautions during self-care and functional transfers.                  Learning Progress Summary             Patient Acceptance, E,TB, VU by LW at 6/4/2023 1433    Acceptance, E,TB,H, VU,DU by  at 6/3/2023 1557    Comment: Educated patient on Home safety and EC/WS    Acceptance, E,TB, VU by LW at 6/3/2023 1553    Acceptance, E, VU,NR by  at 6/2/2023 1159    Acceptance, E, VU,NR by  at 6/2/2023 1109    Acceptance, E,TB, VU by  at 6/1/2023 1531    Comment: OT role, POC, t/f training                         Point: Body mechanics (Done)       Description:   Instruct learner(s) on proper positioning and spine alignment during self-care, functional mobility activities and/or exercises.                  Learning Progress Summary             Patient Acceptance, E,TB, VU by LW at 6/4/2023 1433    Acceptance, E,TB,H, VU,DU by  at 6/3/2023 1557    Comment: Educated patient on Home safety and EC/WS    Acceptance, E,TB, VU by LW at 6/3/2023 1553    Acceptance, E, VU,NR by  at 6/2/2023 1159    Acceptance, E, VU,NR by  at 6/2/2023 1109    Acceptance, E,TB, VU by  at 6/1/2023 1531    Comment: OT role, POC, t/f training                                         User Key       Initials Effective Dates Name Provider Type Discipline     06/16/21 -  Amber Abdalla COTA Occupational Therapist Assistant OT     06/16/21 -  Cherise Garza COTA Occupational Therapist Assistant OT     10/19/22 -  Destiney Henriquez OT Occupational Therapist OT                  OT Recommendation and Plan     Plan of Care Review  Plan of Care Reviewed With: patient  Progress: improving  Outcome Evaluation: Patient supine in bed. UE ROM with 1 lb wt 10X2 completed with good tolerance. Patient needing increased time for tasks for safety. Supine to  sit<>sit to supine Independent. Sit to stand<>stand to sit Independent. T/f to commode Independent no R/W. Pt stood sink side while performing grooming with [place] for ~10 minutes. Pt ambulated in hallway with walker over 350 feet. Pt supine in bed all needs in reach.     Time Calculation:    Time Calculation- OT       Row Name 06/04/23 1434             Time Calculation- OT    OT Start Time 0720  -LW      OT Stop Time 0830  -LW      OT Time Calculation (min) 70 min  -LW      Total Timed Code Minutes- OT 70 minute(s)  -LW      OT Received On 06/04/23  -LW         Timed Charges    59107 - OT Therapeutic Exercise Minutes 25  -LW      82513 - OT Therapeutic Activity Minutes 25  -LW      67799 - OT Self Care/Mgmt Minutes 20  -LW         Total Minutes    Timed Charges Total Minutes 70  -LW       Total Minutes 70  -LW                User Key  (r) = Recorded By, (t) = Taken By, (c) = Cosigned By      Initials Name Provider Type    LW Cherise Garza COTA Occupational Therapist Assistant                  Therapy Charges for Today       Code Description Service Date Service Provider Modifiers Qty    17801400728 HC OT SELF CARE/MGMT/TRAIN EA 15 MIN 6/3/2023 Cherise Garza COTA GO 4    14150251326 HC OT SELF CARE/MGMT/TRAIN EA 15 MIN 6/3/2023 Cherise Garza COTA GO 2    62743287126 HC OT THER PROC EA 15 MIN 6/4/2023 Cherise Garza COTA GO 2    89474587603 HC OT THERAPEUTIC ACT EA 15 MIN 6/4/2023 Cherise Garza COTA GO 2    25128941076 HC OT SELF CARE/MGMT/TRAIN EA 15 MIN 6/4/2023 Cherise Garza COTA GO 1                 PEGGY Gifford  6/4/2023

## 2023-06-04 NOTE — PLAN OF CARE
Goal Outcome Evaluation:  Plan of Care Reviewed With: patient        Progress: no change  Outcome Evaluation: VSS, patient had a temp this morning 100.5 but came down after ambulating and using incentive, pt doing well, will continue to monitor

## 2023-06-04 NOTE — OUTREACH NOTE
Prep Survey      Flowsheet Row Responses   [facility] patient discharged from? [facility]   Is LACE score < 7 ? No   Eligibility [facility]   Date of Admission 06/01/23   Date of Discharge 06/04/23   Discharge Disposition Home or Self Care   Discharge diagnosis LAPAROSCOPIC ASSISTED RIGHT COLON RESECTION   Does the patient have one of the following disease processes/diagnoses(primary or secondary)? General Surgery   Does the patient have Home health ordered? No   Is there a DME ordered? Yes   What DME was ordered? [facility] Walker and Hospital bed.   Prep survey completed? Yes            Livier PEDROZA - Registered Nurse

## 2023-06-04 NOTE — SIGNIFICANT NOTE
06/04/23 1336   OTHER   Discipline physical therapy assistant   Rehab Time/Intention   Session Not Performed patient unavailable for treatment  (Pt checked on x 2 this pm and was with [facility] each time. Will check back as time allows.)

## 2023-06-04 NOTE — PLAN OF CARE
Goal Outcome Evaluation:  Plan of Care Reviewed With: patient        Progress: improving  Outcome Evaluation: Patient supine in bed. Patient needing increased time for tasks for safety. Supine to sit<>sit to supine Independent. Sit to stand<>stand to sit Independent. T/f to commode Independent no R/W. Pt stood sink side while performing grooming with [place] for ~10 minutes. Pt ambulated in hallway with walker over 350 feet. Pt supine in bed all needs in reach.

## 2023-06-04 NOTE — NURSING NOTE
Pain meds obtained per myself from [facility] and given to pt. Zipped in tote bag at bedside. Maribell RANDHAWA witness.

## 2023-06-04 NOTE — THERAPY TREATMENT NOTE
Patient Name: Jo Ann Lacy  : 1960    MRN: [id number]                              Today's Date: 2023       Admit Date: 2023    Visit Dx:     ICD-10-CM ICD-9-CM   1. Tubular adenoma of colon  D12.6 211.3   2. Impaired functional mobility, balance, gait, and endurance  Z74.09 V49.89   3. Impaired mobility and ADLs  Z74.09 V49.89    Z78.9      Patient Active Problem List   Diagnosis    Tubular adenoma of colon    Villous adenoma of colon    Benign polyp of large intestine    Essential hypertension    Vitamin D deficiency    Hypertriglyceridemia    Hypercholesteremia    Acute bilateral knee pain    Dyslipidemia    Stress    Anxiety    History of colon polyps     Past Medical History:   Diagnosis Date    Arthritis     Benign polyp of large intestine     Elevated cholesterol     Essential hypertension     Gastroesophageal reflux disease     History of transfusion     Hypertension     Postmenopausal state     Tubular adenoma of colon     Villous adenoma of colon     1.6cm VTA with intermediate grade dysplasia transverse.  1.0cm VTA with intermediate grade dysplasia ascending    Vitamin D deficiency      Past Surgical History:   Procedure Laterality Date    COLONOSCOPY  2016    Many 4-5 mm polyps in the transverse colon and in the ascending colon.Resected and retrieved.External and internal hemorrhoids    COLONOSCOPY N/A 03/10/2023    Procedure: COLONOSCOPY;  Surgeon: Last Miranda MD;  Location: [facility] ENDOSCOPY;  Service: Gastroenterology;  Laterality: N/A;    ECTOPIC PREGNANCY SURGERY      KNEE SURGERY Left     TOTAL ABDOMINAL HYSTERECTOMY WITH SALPINGO OOPHORECTOMY        General Information       Row Name 23 1420 23 1252       OT Time and Intention    Document Type therapy note (daily note)  -LW therapy note (daily note)  -CS    Mode of Treatment individual therapy;occupational therapy  -LW individual therapy;occupational therapy  -CS      Row Name 23 1420 23  1252       General Information    Patient Profile Reviewed yes  -LW yes  -CS    Existing Precautions/Restrictions fall  -LW fall  -CS      Row Name 06/04/23 1420 06/04/23 1252       Cognition    Orientation Status (Cognition) oriented x 4  -LW oriented x 4  -CS      Row Name 06/04/23 1420 06/04/23 1252       Safety Issues, Functional Mobility    Impairments Affecting Function (Mobility) endurance/activity tolerance;pain;strength  -LW endurance/activity tolerance;pain;strength  -CS              User Key  (r) = Recorded By, (t) = Taken By, (c) = Cosigned By      Initials Name Provider Type    CS Rashida Gallegos COTA Occupational Therapist Assistant    LW Cherise Garza COTA Occupational Therapist Assistant                     Mobility/ADL's       Row Name 06/04/23 1420 06/04/23 1258       Bed Mobility    Bed Mobility supine-sit;sit-supine  -LW --    Supine-Sit [place] (Bed Mobility) independent  -LW independent  -CS    Sit-Supine [place] (Bed Mobility) independent  -LW independent  -CS    Assistive Device (Bed Mobility) head of bed elevated  -LW head of bed elevated  -CS      Row Name 06/04/23 1420 06/04/23 1258       Transfers    Transfers sit-stand transfer;stand-sit transfer;toilet transfer  -LW sit-stand transfer;stand-sit transfer;toilet transfer  -CS      Row Name 06/04/23 1420 06/04/23 1258       Sit-Stand Transfer    Sit-Stand [place] (Transfers) independent  -LW independent  -CS    Assistive Device (Sit-Stand Transfers) other (see comments)  No AD  -LW --      Row Name 06/04/23 1420 06/04/23 1258       Stand-Sit Transfer    Stand-Sit [place] (Transfers) independent  -LW independent  -CS    Assistive Device (Stand-Sit Transfers) other (see comments)  No AD  -LW --      Row Name 06/04/23 1420 06/04/23 1258       Toilet Transfer    Type (Toilet Transfer) sit-stand;stand-sit  -LW sit-stand;stand-sit  -CS    [place] Level (Toilet Transfer) independent  -LW independent  -CS     Assistive Device (Toilet Transfer) commode  -LW commode  -      Row Name 06/04/23 1420          Functional Mobility    Functional Mobility- Ind. Level conditional independence  -     Functional Mobility- Device walker, front-wheeled  -     Functional Mobility-Distance (Feet) 350  -       Row Name 06/04/23 1420          Activities of Daily Living    BADL Assessment/Intervention grooming;toileting;upper body dressing  -       Row Name 06/04/23 1420          Bathing Assessment/Intervention    Comment, (Bathing) Pt stated she already had a bath.  -       Row Name 06/04/23 1420          Upper Body Dressing Assessment/Training    [place] Level (Upper Body Dressing) upper body dressing skills;don;independent  Hospital gown  -     Position (Upper Body Dressing) supported standing  -       Row Name 06/04/23 1420          Grooming Assessment/Training    [place] Level (Grooming) grooming skills;hair care, combing/brushing;oral care regimen;wash face, hands;independent  -     Position (Grooming) sink side  -     Comment, (Grooming) Patient stood sink side while completing tasks with [place] for ~10 mins  -       Row Name 06/04/23 1420          Toileting Assessment/Training    [place] Level (Toileting) toileting skills;adjust/manage clothing;perform perineal hygiene;independent  -     Assistive Devices (Toileting) commode;grab bar/safety frame  -     Position (Toileting) unsupported sitting  -               User Key  (r) = Recorded By, (t) = Taken By, (c) = Cosigned By      Initials Name Provider Type     Rashida Gallegos COTA Occupational Therapist Assistant    Cherise Weaver COTA Occupational Therapist Assistant                   Obj/Interventions       Row Name 06/04/23 1436 06/04/23 1252       Shoulder (Therapeutic Exercise)    Shoulder (Therapeutic Exercise) -- AROM (active range of motion)  -    Shoulder AROM (Therapeutic Exercise)  bilateral;flexion;extension;external rotation;internal rotation;10 repetitions;2 sets  -LW bilateral;flexion;extension;external rotation;internal rotation  -      Row Name 06/04/23 1436 06/04/23 1252       Elbow/Forearm (Therapeutic Exercise)    Elbow/Forearm (Therapeutic Exercise) AROM (active range of motion)  -LW AROM (active range of motion)  -    Elbow/Forearm AROM (Therapeutic Exercise) bilateral;flexion;extension;supination;pronation;10 repetitions;2 sets  -LW bilateral;flexion;extension;supination;pronation  -      Row Name 06/04/23 1436 06/04/23 1252       Wrist (Therapeutic Exercise)    Wrist (Therapeutic Exercise) AROM (active range of motion)  -LW AROM (active range of motion)  -    Wrist AROM (Therapeutic Exercise) bilateral;10 repetitions;2 sets  -LW bilateral;flexion;extension  -      Row Name 06/04/23 1436 06/04/23 1252       Hand (Therapeutic Exercise)    Hand (Therapeutic Exercise) AROM (active range of motion)  -LW AROM (active range of motion)  -    Hand AROM/AAROM (Therapeutic Exercise) bilateral;finger flexion;finger extension;finger aBduction;finger aDduction;10 repetitions;2 sets  -LW bilateral;finger flexion;finger extension  -      Row Name 06/04/23 1436 06/04/23 1252       Motor Skills    Therapeutic Exercise shoulder;elbow/forearm;wrist;hand  -LW shoulder;elbow/forearm;wrist;hand  -CS              User Key  (r) = Recorded By, (t) = Taken By, (c) = Cosigned By      Initials Name Provider Type    CS Rashida Gallegos COTA Occupational Therapist Assistant    Cherise Weaver COTA Occupational Therapist Assistant                   Goals/Plan       Row Name 06/04/23 1252 06/04/23 0720       Transfer Goal 1 (OT)    Activity/Assistive Device (Transfer Goal 1, OT) transfers, all  -CS transfers, all  -LW    [place] Level/Cues Needed (Transfer Goal 1, OT) modified independence  -CS modified independence  -LW    Time Frame (Transfer Goal 1, OT) long term goal (LTG);by  discharge  -CS long term goal (LTG);by discharge  -LW    Progress/Outcome (Transfer Goal 1, OT) goal met  -CS goal met   -LW      Row Name 06/04/23 1252 06/04/23 0720       Dressing Goal 1 (OT)    Activity/Device (Dressing Goal 1, OT) dressing skills, all  -CS dressing skills, all  -LW    [place]/Cues Needed (Dressing Goal 1, OT) independent  -CS independent  -LW    Time Frame (Dressing Goal 1, OT) long term goal (LTG);by discharge  -CS long term goal (LTG);by discharge  -LW    Progress/Outcome (Dressing Goal 1, OT) goal not met  -CS goal not met  -LW      Row Name 06/04/23 1252 06/04/23 0720       Toileting Goal 1 (OT)    Activity/Device (Toileting Goal 1, OT) toileting skills, all  -CS toileting skills, all  -LW    [place] Level/Cues Needed (Toileting Goal 1, OT) independent  -CS independent  -LW    Time Frame (Toileting Goal 1, OT) long term goal (LTG);by discharge  -CS long term goal (LTG);by discharge  -LW    Progress/Outcome (Toileting Goal 1, OT) goal met  -CS goal met  -LW      Row Name 06/04/23 1252 06/04/23 0720       Problem Specific Goal 1 (OT)    Problem Specific Goal 1 (OT) Pt will tolerate participating in OOB fxnl activities for ~25 minutes to improve activity tolerance.  -CS Pt will tolerate participating in OOB fxnl activities for ~25 minutes to improve activity tolerance.  -LW    Time Frame (Problem Specific Goal 1, OT) long term goal (LTG);by discharge  -CS long term goal (LTG);by discharge  -LW    Progress/Outcome (Problem Specific Goal 1, OT) goal met  -CS goal met   -LW              User Key  (r) = Recorded By, (t) = Taken By, (c) = Cosigned By      Initials Name Provider Type    CS Rashida Gallegos COTA Occupational Therapist Assistant    LW Cherise Garza COTA Occupational Therapist Assistant                   Clinical Impression       Row Name 06/04/23 1428 06/04/23 1252       Pain Assessment    Pretreatment Pain Rating 3/10  -LW 3/10  -CS    Posttreatment Pain Rating 3/10   -LW 3/10  -CS    Pain Location - Side/Orientation Right  -LW --    Pain Location upper  -LW --    Pain Location - abdomen  -LW abdomen  -CS    Pain Intervention(s) Repositioned  -LW Rest;Repositioned  -CS      Row Name 06/04/23 1428 06/04/23 1252       Plan of Care Review    Plan of Care Reviewed With patient  -LW patient  -CS    Progress improving  -LW improving  -CS    Outcome Evaluation Patient supine in bed. UE ROM with 1 lb wt 10X2 completed with good tolerance. Patient needing increased time for tasks for safety. Supine to sit<>sit to supine Independent. Sit to stand<>stand to sit Independent. T/f to commode Independent no R/W. Pt stood sink side while performing grooming with [place] for ~10 minutes. Pt ambulated in hallway with walker over 350 feet. Pt supine in bed all needs in reach.  -LW --      Row Name 06/04/23 1252          Therapy Assessment/Plan (OT)    Rehab Potential (OT) good, to achieve stated therapy goals  -CS     Criteria for Skilled Therapeutic Interventions Met (OT) yes;meets criteria;skilled treatment is necessary  -CS     Therapy Frequency (OT) 2 times/day  -CS       Row Name 06/04/23 1252          Therapy Plan Review/Discharge Plan (OT)    Anticipated Discharge Disposition (OT) home with assist;home with home health  -CS       Row Name 06/04/23 1252          Vital Signs    Pre Patient Position Supine  -CS     Post Patient Position Sitting  -CS       Row Name 06/04/23 1428 06/04/23 1252       Positioning and Restraints    Pre-Treatment Position in bed  -LW in bed  -CS    Post Treatment Position bed  -LW bed  -CS    In Bed call light within reach;encouraged to call for assist;exit alarm on  -LW sitting;call light within reach;encouraged to call for assist  -CS              User Key  (r) = Recorded By, (t) = Taken By, (c) = Cosigned By      Initials Name Provider Type    CS Rashida Gallegos COTA Occupational Therapist Assistant    Cherise Weaver COTA Occupational Therapist  Assistant                   Outcome Measures       Row Name 06/04/23 1433 06/04/23 1252       How much help from another is currently needed...    Putting on and taking off regular lower body clothing? 4  -LW 4  -CS    Bathing (including washing, rinsing, and drying) 4  -LW 4  -CS    Toileting (which includes using toilet bed pan or urinal) 4  -LW 4  -CS    Putting on and taking off regular upper body clothing 4  -LW 4  -CS    Taking care of personal grooming (such as brushing teeth) 4  -LW 4  -CS    Eating meals 4  -LW 4  -CS    AM-PAC 6 Clicks Score (OT) 24  -LW 24  -CS      Row Name 06/04/23 1100          How much help from another person do you currently need...    Turning from your back to your side while in flat bed without using bedrails? 4  -LR     Moving from lying on back to sitting on the side of a flat bed without bedrails? 4  -LR     Moving to and from a bed to a chair (including a wheelchair)? 4  -LR     Standing up from a chair using your arms (e.g., wheelchair, bedside chair)? 4  -LR     Climbing 3-5 steps with a railing? 3  -LR     To walk in hospital room? 4  -LR     AM-PAC 6 Clicks Score (PT) 23  -LR     Highest level of mobility 7 --> Walked 25 feet or more  -LR               User Key  (r) = Recorded By, (t) = Taken By, (c) = Cosigned By      Initials Name Provider Type    LR Dana Diaz RN Registered Nurse    Rashida Meyers COTA Occupational Therapist Assistant    Cherise Weaver COTA Occupational Therapist Assistant                    Occupational Therapy Education       Title: PT OT SLP Therapies (In Progress)       Topic: Occupational Therapy (Done)       Point: ADL training (Done)       Description:   Instruct learner(s) on proper safety adaptation and remediation techniques during self care or transfers.   Instruct in proper use of assistive devices.                  Learning Progress Summary             Patient Acceptance, E,TB,D,H, VU by  at 6/4/2023 6490    Comment:  HEP and home safety    Acceptance, E,TB, VU by LW at 6/4/2023 1433    Acceptance, E,TB,H, VU,DU by LW at 6/3/2023 1557    Comment: Educated patient on Home safety and EC/WS    Acceptance, E,TB, VU by LW at 6/3/2023 1553    Acceptance, E, VU,NR by RC at 6/2/2023 1159    Acceptance, E, VU,NR by RC at 6/2/2023 1109    Acceptance, E,TB, VU by  at 6/1/2023 1531    Comment: OT role, POC, t/f training                         Point: Home exercise program (Done)       Description:   Instruct learner(s) on appropriate technique for monitoring, assisting and/or progressing therapeutic exercises/activities.                  Learning Progress Summary             Patient Acceptance, E,TB,D,H, VU by  at 6/4/2023 1520    Comment: HEP and home safety    Acceptance, E,TB, VU by LW at 6/4/2023 1433    Acceptance, E,TB,H, VU,DU by  at 6/3/2023 1557    Comment: Educated patient on Home safety and EC/WS    Acceptance, E,TB, VU by LW at 6/3/2023 1553                         Point: Precautions (Done)       Description:   Instruct learner(s) on prescribed precautions during self-care and functional transfers.                  Learning Progress Summary             Patient Acceptance, E,TB,D,H, VU by  at 6/4/2023 1520    Comment: HEP and home safety    Acceptance, E,TB, VU by LW at 6/4/2023 1433    Acceptance, E,TB,H, VU,DU by  at 6/3/2023 1557    Comment: Educated patient on Home safety and EC/WS    Acceptance, E,TB, VU by LW at 6/3/2023 1553    Acceptance, E, VU,NR by  at 6/2/2023 1159    Acceptance, E, VU,NR by  at 6/2/2023 1109    Acceptance, E,TB, VU by  at 6/1/2023 1531    Comment: OT role, POC, t/f training                         Point: Body mechanics (Done)       Description:   Instruct learner(s) on proper positioning and spine alignment during self-care, functional mobility activities and/or exercises.                  Learning Progress Summary             Patient Acceptance, E,TB,D,H, VU by  at 6/4/2023 0455     Comment: HEP and home safety    Acceptance, E,TB, VU by LW at 6/4/2023 1433    Acceptance, E,TB,H, VU,DU by  at 6/3/2023 1557    Comment: Educated patient on Home safety and EC/WS    Acceptance, E,TB, VU by  at 6/3/2023 1553    Acceptance, E, VU,NR by  at 6/2/2023 1159    Acceptance, E, VU,NR by  at 6/2/2023 1109    Acceptance, E,TB, VU by  at 6/1/2023 1531    Comment: OT role, POC, t/f training                                         User Key       Initials Effective Dates Name Provider Type Discipline     06/16/21 -  Amber Abdalla COTA Occupational Therapist Assistant OT     06/16/21 -  Rashida Gallegos COTA Occupational Therapist Assistant OT     06/16/21 -  Cherise Garza COTA Occupational Therapist Assistant OT     10/19/22 -  Destiney Henriquez OT Occupational Therapist OT                  OT Recommendation and Plan  Therapy Frequency (OT): 2 times/day  Plan of Care Review  Plan of Care Reviewed With: patient  Progress: improving     Time Calculation:    Time Calculation- OT       Row Name 06/04/23 1521 06/04/23 1434          Time Calculation- OT    OT Start Time 1252  - 0720  -LW     OT Stop Time 1338  - 0830  -LW     OT Time Calculation (min) 46 min  -CS 70 min  -LW     Total Timed Code Minutes- OT 46 minute(s)  -CS 70 minute(s)  -LW     OT Received On 06/04/23  - 06/04/23  -LW        Timed Charges    02117 - OT Therapeutic Exercise Minutes 25  -CS 25  -LW     17321 - OT Therapeutic Activity Minutes -- 25  -LW     21697 - OT Self Care/Mgmt Minutes 21  -CS 20  -LW        Total Minutes    Timed Charges Total Minutes 46  -CS 70  -LW      Total Minutes 46  -CS 70  -LW               User Key  (r) = Recorded By, (t) = Taken By, (c) = Cosigned By      Initials Name Provider Type    CS Rashida Gallegos COTA Occupational Therapist Assistant    LW Cherise Garza COTA Occupational Therapist Assistant                  Therapy Charges for Today       Code Description Service Date  Service Provider Modifiers Qty    12246192801 HC OT SELF CARE/MGMT/TRAIN EA 15 MIN 6/4/2023 Rashida Gallegos COTA GO 1    32799198030 HC OT THER PROC EA 15 MIN 6/4/2023 Rashida Gallegos COTA GO 2                 PEGGY Vazquez  6/4/2023

## 2023-06-04 NOTE — DISCHARGE SUMMARY
Discharge Summary    Date of Admission: 6/1/2023  Date of Discharge:  6/4/2023  Service: General Surgery  Attending: Dimas Pompa    Procedures Performed: Procedure(s):  LAPAROSCOPIC ASSISTED RIGHT COLON RESECTION  Consults:   Consults       No orders found from 5/3/2023 to 6/2/2023.          Admitting Diagnoses: Tubular adenoma of colon [D12.6]   Discharge Diagnoses:   Active Hospital Problems    Diagnosis     **Tubular adenoma of colon        Hospital Course: 62-year-old female with a large tubular adenoma in the ascending colon that was not resectable endoscopically.  She underwent a laparoscopic-assisted right colectomy on 6/1.  Her hospital course was uncomplicated.  At the time of discharge she was tolerating regular diet, her pain was well controlled with p.o. pain medication, she was having bowel function, and she was ambulating with a walker.    Discharge Condition: Postoperatively Stable    Discharge Medications:     Your medication list        START taking these medications        Instructions Last Dose Given Next Dose Due   oxyCODONE 5 MG immediate release tablet  Commonly known as: ROXICODONE      Take 1 tablet by mouth Every 4 (Four) Hours As Needed for Moderate Pain for up to 7 days.              CONTINUE taking these medications        Instructions Last Dose Given Next Dose Due   amLODIPine 5 MG tablet  Commonly known as: NORVASC      Take 1 tablet by mouth Daily.       neomycin 500 MG tablet  Commonly known as: MYCIFRADIN      Take 1 tablet by mouth See Admin Instructions. Take two (2) tablets at 7 PM and two (2) tablets at 11 PM night prior to surgery.       omeprazole 20 MG capsule  Commonly known as: priLOSEC      Take 1 capsule by mouth Daily As Needed.                 Where to Get Your Medications        These medications were sent to [facility]/pharmacy #[id number] - [place], [place] - [address] [place] [address] - [phone] [facility] [phone] [address] [address]      Phone: [phone]    oxyCODONE 5 MG immediate release tablet       Vitals:    06/04/23 0323   BP:    Pulse:    Resp:    Temp: 98.9 °F (37.2 °C)   SpO2:      Physical Exam  Constitutional:       Appearance: Normal appearance.   HENT:      Head: Normocephalic and atraumatic.   Cardiovascular:      Rate and Rhythm: Normal rate and regular rhythm.   Pulmonary:      Effort: Pulmonary effort is normal. No respiratory distress.   Abdominal:      General: There is no distension.      Palpations: Abdomen is soft.      Tenderness: There is no abdominal tenderness.      Comments: Incisions are clean/dry/intact   Neurological:      Mental Status: She is alert.             Regular Diet    Physical Activity: No lifting greater than 20 pounds for the next 2 weeks         Follow-up Appointments  Dr. Miranda in 1 week          This document has been electronically signed by Dimas oPmpa MD on June 4, 2023 08:37 CDT

## 2023-06-04 NOTE — DISCHARGE INSTRUCTIONS
Okay to shower.  No driving while narcotic pain medication.  No lifting greater than 20 pounds for the next 2 weeks.

## 2023-06-04 NOTE — PLAN OF CARE
Goal Outcome Evaluation:  Plan of Care Reviewed With: patient        Progress: improving  Outcome Evaluation: Pt sup in bed and agreeable to therapy. Pt able to t/f to sitting with mod ind with HOB elevated. Pt able to stand ind without AD and amb 312ft x2 with no AD used with occassional unsteadiness but no LOB noted. Pt performed seated B LE ther ex in between gait trials with good tech observed and good tolerance noted. Pt returned to room and to sup in bed with all needs met. Pt with slightly elevated BP after gait. Nsg aware. Pt would cont to benefit from therapy at this time.

## 2023-06-05 ENCOUNTER — TRANSITIONAL CARE MANAGEMENT TELEPHONE ENCOUNTER (OUTPATIENT)
Dept: CALL CENTER | Facility: HOSPITAL | Age: 63
End: 2023-06-05
Payer: MEDICAID

## 2023-06-05 LAB — REF LAB TEST METHOD: NORMAL

## 2023-06-05 NOTE — DISCHARGE PLACEMENT REQUEST
"Jo Ann Tavares (62 y.o. Female)       Date of Birth   1960    Social Security Number       Address   [address] [address]    Home Phone   [phone]    MRN   [id number]       Evangelical   Gnosticist    Marital Status   Single                            Admission Date   6/1/23    Admission Type   Elective    Admitting Provider   Last Miranda MD    Attending Provider       Department, Room/Bed   [address], [facility]/1       Discharge Date   6/4/2023    Discharge Disposition   Home or Self Care    Discharge Destination                                 Attending Provider: (none)   Allergies: Contrast Dye (Echo Or Unknown Ct/mr)    Isolation: None   Infection: None   Code Status: Prior    Ht: 160 cm (63\")   Wt: 60.8 kg (134 lb)    Admission Cmt: None   Principal Problem: Tubular adenoma of colon [D12.6]                   Active Insurance as of 6/1/2023       Primary Coverage       Payor Plan Insurance Group Employer/Plan Group    ANTHEM MEDICAID [place] MEDICAID [id number]       Payor Plan Address Payor Plan Phone Number Payor Plan Fax Number Effective Dates    [address] [address] [phone]  10/14/2022 - None Entered    [facility] [id number]         Subscriber Name Subscriber Birth Date Member ID       JO ANN TAVARES 1960 [id number]                     Emergency Contacts        (Rel.) Home Phone Work Phone Mobile Phone    ABEL HENDERSON (Friend) [phone] -- [phone]    ReginoShahid -- -- [phone]                 History & Physical        Last Miranda MD at 06/01/23 0610          H&P reviewed. The patient was examined and there are no changes to the H&P.      Temp:  [97.7 °F (36.5 °C)] 97.7 °F (36.5 °C)  Heart Rate:  [84] 84  Resp:  [18] 18  BP: (127)/(82) 127/82      Electronically signed by Last Miranda MD at 06/01/23 0611   Source Note          62-year-old female found to have a likely 4 " cm sessile tubular adenomatous polyp involving her right colon.  Unable to be removed endoscopically.  Patient had this biopsied 5 years ago in [place] and then recently had this rebiopsied.  Biopsy did not demonstrate any obvious cancer but confirms this is a tubular adenomatous polyp.  Patient aware the small chance of may be cancer present in nonbiopsied portions of the polyp.  CT scan was unremarkable for any metastatic disease.  X-ray with scope in place demonstrates this to be right upper quadrant and the distal portion of the right colon.  It is has been tattooed previously    Vitals:    05/15/23 1257   BP: 122/76   Pulse: 82   Temp: 96.8 °F (36 °C)       Allergies:   Allergies   Allergen Reactions    Contrast Dye (Echo Or Unknown Ct/Mr) Swelling       Home Medications:  Prior to Admission medications    Medication Sig Start Date End Date Taking? Authorizing Provider   amLODIPine (NORVASC) 5 MG tablet Take 1 tablet by mouth Daily. 2/28/23  Yes Spring Hollins DNP, APRN   cetirizine (zyrTEC) 10 MG tablet Take 1 tablet by mouth Daily.  Patient not taking: Reported on 5/15/2023 4/29/23   Provider, MD Aquilino   chlorhexidine (HIBICLENS) 4 % external liquid Apply  topically to the appropriate area as directed 2 (Two) Times a Day. Shower With Hibiclens Solution Twice The Day Before Surgery 5/15/23   Last Miranda MD   omeprazole (priLOSEC) 20 MG capsule Take 1 capsule by mouth Daily.  Patient not taking: Reported on 5/15/2023 2/28/23   Spring Hollins DNP, APRN   rosuvastatin (CRESTOR) 20 MG tablet Take 1 tablet by mouth Every Night.  Patient not taking: Reported on 5/15/2023 2/28/23   Spring Hollins DNP, APRN   traZODone (DESYREL) 50 MG tablet Take 1 tablet by mouth Every Night.  Patient not taking: Reported on 5/15/2023 2/28/23   Spring Hollins DNP, APRN   vitamin D (ERGOCALCIFEROL) 1.25 MG (40801 UT) capsule capsule Take 1 capsule by mouth 1 (One) Time Per Week.  Patient not taking: Reported on 5/15/2023  2/28/23   Spring Hollins, DNP, APRN   polyethylene glycol (GoLYTELY) 236 g solution Please use instructions given in office. 2/7/23 5/15/23  Vaibhav Olsen PA-C       Social History     Socioeconomic History    Marital status: Single   Tobacco Use    Smoking status: Every Day     Packs/day: 1.00     Years: 45.00     Pack years: 45.00     Types: Cigarettes     Start date: 1975    Smokeless tobacco: Never   Vaping Use    Vaping Use: Never used   Substance and Sexual Activity    Alcohol use: No    Drug use: No    Sexual activity: Not Currently     Birth control/protection: Surgical       Past Medical History:   Diagnosis Date    Benign polyp of large intestine     Essential hypertension     Gastroesophageal reflux disease     Hypertension     Postmenopausal state     Tubular adenoma of colon     Villous adenoma of colon     1.6cm VTA with intermediate grade dysplasia transverse.  1.0cm VTA with intermediate grade dysplasia ascending    Vitamin D deficiency        Family History   Problem Relation Age of Onset    Stroke Mother     Cataracts Mother     Hypertension Mother     Heart disease Father     Diabetes Father     Obesity Father     Glaucoma Father     Hypertension Father        Past Surgical History:   Procedure Laterality Date    COLONOSCOPY  05/16/2016    Many 4-5 mm polyps in the transverse colon and in the ascending colon.Resected and retrieved.External and internal hemorrhoids    COLONOSCOPY N/A 3/10/2023    Procedure: COLONOSCOPY;  Surgeon: Last Miranda MD;  Location: [facility] ENDOSCOPY;  Service: Gastroenterology;  Laterality: N/A;    HYSTERECTOMY  2013    KNEE SURGERY Left     TOTAL ABDOMINAL HYSTERECTOMY WITH SALPINGO OOPHORECTOMY       Review of systems  Denies chest pain  Denies shortness of breath  Denies palpitations  Denies abdominal pain  Denies nausea and vomiting  Denies any urinary complaints  Denies fever chills  Does not take any anticoagulants  Denies any bleeding issues   Denies history  of DVT  No history of asthma  No history of seizures    Physical Exam  Constitutional:       General: She is not in acute distress.     Appearance: She is well-developed. She is not ill-appearing or toxic-appearing.   HENT:      Head: Normocephalic and atraumatic.      Nose: Nose normal.   Eyes:      General: No scleral icterus.     Conjunctiva/sclera: Conjunctivae normal.   Neck:      Thyroid: No thyromegaly.      Trachea: No tracheal deviation.   Cardiovascular:      Rate and Rhythm: Normal rate and regular rhythm.      Heart sounds: Normal heart sounds. No murmur heard.    No friction rub. No gallop.   Pulmonary:      Effort: Pulmonary effort is normal. No respiratory distress.      Breath sounds: Normal breath sounds. No stridor. No wheezing or rales.   Chest:      Chest wall: No tenderness.   Abdominal:      General: Bowel sounds are normal. There is no distension.      Palpations: Abdomen is soft. There is no mass.      Tenderness: There is no abdominal tenderness. There is no guarding or rebound.      Hernia: No hernia is present.   Musculoskeletal:         General: No deformity. Normal range of motion.      Cervical back: Normal range of motion and neck supple.   Lymphadenopathy:      Cervical: No cervical adenopathy.   Skin:     General: Skin is warm and dry.      Coloration: Skin is not pale.      Findings: No erythema or rash.      Nails: There is no clubbing.   Neurological:      Mental Status: She is alert and oriented to person, place, and time.   Psychiatric:         Behavior: Behavior normal.         Thought Content: Thought content normal.     Assessment and plan  Tubular adenomatous polyp recommend laparoscopic right colectomy to address.  Fully discussed the procedure alternatives risk and benefits with the patient she clearly understands.  She will be placed on a RASS protocol.    Electronically signed by Last Miranda MD at 05/15/23 4781                 Last Miranda MD at 05/15/23  1300          62-year-old female found to have a likely 4 cm sessile tubular adenomatous polyp involving her right colon.  Unable to be removed endoscopically.  Patient had this biopsied 5 years ago in [place] and then recently had this rebiopsied.  Biopsy did not demonstrate any obvious cancer but confirms this is a tubular adenomatous polyp.  Patient aware the small chance of may be cancer present in nonbiopsied portions of the polyp.  CT scan was unremarkable for any metastatic disease.  X-ray with scope in place demonstrates this to be right upper quadrant and the distal portion of the right colon.  It is has been tattooed previously    Vitals:    05/15/23 1257   BP: 122/76   Pulse: 82   Temp: 96.8 °F (36 °C)       Allergies:   Allergies   Allergen Reactions    Contrast Dye (Echo Or Unknown Ct/Mr) Swelling       Home Medications:  Prior to Admission medications    Medication Sig Start Date End Date Taking? Authorizing Provider   amLODIPine (NORVASC) 5 MG tablet Take 1 tablet by mouth Daily. 2/28/23  Yes Spring Hollins DNP, APRN   cetirizine (zyrTEC) 10 MG tablet Take 1 tablet by mouth Daily.  Patient not taking: Reported on 5/15/2023 4/29/23   Provider, MD Aquilino   chlorhexidine (HIBICLENS) 4 % external liquid Apply  topically to the appropriate area as directed 2 (Two) Times a Day. Shower With Hibiclens Solution Twice The Day Before Surgery 5/15/23   Last Miranda MD   omeprazole (priLOSEC) 20 MG capsule Take 1 capsule by mouth Daily.  Patient not taking: Reported on 5/15/2023 2/28/23   Spring Hollins DNP, APRN   rosuvastatin (CRESTOR) 20 MG tablet Take 1 tablet by mouth Every Night.  Patient not taking: Reported on 5/15/2023 2/28/23   Spring Hollins DNP, APRN   traZODone (DESYREL) 50 MG tablet Take 1 tablet by mouth Every Night.  Patient not taking: Reported on 5/15/2023 2/28/23   Spring Hollins DNP, APRN   vitamin D (ERGOCALCIFEROL) 1.25 MG (73801 UT) capsule capsule Take 1 capsule by mouth 1 (One)  Time Per Week.  Patient not taking: Reported on 5/15/2023 2/28/23   Spring Hollins, DNP, APRN   polyethylene glycol (GoLYTELY) 236 g solution Please use instructions given in office. 2/7/23 5/15/23  Vaibhav Olsen, CODY       Social History     Socioeconomic History    Marital status: Single   Tobacco Use    Smoking status: Every Day     Packs/day: 1.00     Years: 45.00     Pack years: 45.00     Types: Cigarettes     Start date: 1975    Smokeless tobacco: Never   Vaping Use    Vaping Use: Never used   Substance and Sexual Activity    Alcohol use: No    Drug use: No    Sexual activity: Not Currently     Birth control/protection: Surgical       Past Medical History:   Diagnosis Date    Benign polyp of large intestine     Essential hypertension     Gastroesophageal reflux disease     Hypertension     Postmenopausal state     Tubular adenoma of colon     Villous adenoma of colon     1.6cm VTA with intermediate grade dysplasia transverse.  1.0cm VTA with intermediate grade dysplasia ascending    Vitamin D deficiency        Family History   Problem Relation Age of Onset    Stroke Mother     Cataracts Mother     Hypertension Mother     Heart disease Father     Diabetes Father     Obesity Father     Glaucoma Father     Hypertension Father        Past Surgical History:   Procedure Laterality Date    COLONOSCOPY  05/16/2016    Many 4-5 mm polyps in the transverse colon and in the ascending colon.Resected and retrieved.External and internal hemorrhoids    COLONOSCOPY N/A 3/10/2023    Procedure: COLONOSCOPY;  Surgeon: Last Miranda MD;  Location: [facility] ENDOSCOPY;  Service: Gastroenterology;  Laterality: N/A;    HYSTERECTOMY  2013    KNEE SURGERY Left     TOTAL ABDOMINAL HYSTERECTOMY WITH SALPINGO OOPHORECTOMY       Review of systems  Denies chest pain  Denies shortness of breath  Denies palpitations  Denies abdominal pain  Denies nausea and vomiting  Denies any urinary complaints  Denies fever chills  Does not take any  anticoagulants  Denies any bleeding issues   Denies history of DVT  No history of asthma  No history of seizures    Physical Exam  Constitutional:       General: She is not in acute distress.     Appearance: She is well-developed. She is not ill-appearing or toxic-appearing.   HENT:      Head: Normocephalic and atraumatic.      Nose: Nose normal.   Eyes:      General: No scleral icterus.     Conjunctiva/sclera: Conjunctivae normal.   Neck:      Thyroid: No thyromegaly.      Trachea: No tracheal deviation.   Cardiovascular:      Rate and Rhythm: Normal rate and regular rhythm.      Heart sounds: Normal heart sounds. No murmur heard.    No friction rub. No gallop.   Pulmonary:      Effort: Pulmonary effort is normal. No respiratory distress.      Breath sounds: Normal breath sounds. No stridor. No wheezing or rales.   Chest:      Chest wall: No tenderness.   Abdominal:      General: Bowel sounds are normal. There is no distension.      Palpations: Abdomen is soft. There is no mass.      Tenderness: There is no abdominal tenderness. There is no guarding or rebound.      Hernia: No hernia is present.   Musculoskeletal:         General: No deformity. Normal range of motion.      Cervical back: Normal range of motion and neck supple.   Lymphadenopathy:      Cervical: No cervical adenopathy.   Skin:     General: Skin is warm and dry.      Coloration: Skin is not pale.      Findings: No erythema or rash.      Nails: There is no clubbing.   Neurological:      Mental Status: She is alert and oriented to person, place, and time.   Psychiatric:         Behavior: Behavior normal.         Thought Content: Thought content normal.     Assessment and plan  Tubular adenomatous polyp recommend laparoscopic right colectomy to address.  Fully discussed the procedure alternatives risk and benefits with the patient she clearly understands.  She will be placed on a RASS protocol.    Electronically signed by Last Miranda MD at  05/15/23 1327          Physician Progress Notes (most recent note)        Dimas Pompa MD at 06/03/23 0955          GENERAL SURGERY PROGRESS NOTE     LOS: 2 days       Interval History:     No acute events overnight.  Patient tolerating a diet and having bowel movements.  She complains of mild abdominal pain and headache    Medication Review:   acetaminophen, 1,000 mg, Oral, Q6H  enoxaparin, 40 mg, Subcutaneous, Daily  famotidine, 20 mg, Oral, BID  gabapentin, 300 mg, Oral, TID        dextrose 5 % and sodium chloride 0.45 % with KCl 20 mEq/L, 100 mL/hr, Last Rate: 100 mL/hr (06/01/23 2140)    Objective     Vital Signs:  Temp:  [98.4 °F (36.9 °C)-98.8 °F (37.1 °C)] 98.8 °F (37.1 °C)  Heart Rate:  [66-74] 72  Resp:  [18] 18  BP: (122-148)/(68-69) 130/69    Intake/Output Summary (Last 24 hours) at 6/3/2023 0955  Last data filed at 6/3/2023 0942  Gross per 24 hour   Intake 900 ml   Output 3550 ml   Net -2650 ml       Physical Exam  Constitutional:       Appearance: Normal appearance.   HENT:      Head: Normocephalic and atraumatic.   Cardiovascular:      Rate and Rhythm: Normal rate and regular rhythm.   Pulmonary:      Effort: Pulmonary effort is normal. No respiratory distress.   Abdominal:      Palpations: Abdomen is soft.      Comments: Appropriately tender to palpation, incisions are clean/dry/intact   Neurological:      General: No focal deficit present.      Mental Status: She is alert and oriented to person, place, and time.   Psychiatric:         Mood and Affect: Mood normal.         Behavior: Behavior normal.       Results Review:    Results from last 7 days   Lab Units 06/02/23  0528   SODIUM mmol/L 137   POTASSIUM mmol/L 3.7   CHLORIDE mmol/L 104   CO2 mmol/L 23.0   BUN mg/dL 6*   CREATININE mg/dL 0.53*   GLUCOSE mg/dL 116*   CALCIUM mg/dL 9.0     Results from last 7 days   Lab Units 06/02/23  0528   WBC 10*3/mm3 11.74*   HEMOGLOBIN g/dL 10.5*   HEMATOCRIT % 30.4*   PLATELETS 10*3/mm3 190        Assessment:    Tubular adenoma of colon      Postop day 2 from laparoscopic right colectomy    Plan:    Patient would like to wait until tomorrow to go home due to social situation.  Encourage ambulation today        This document has been electronically signed by Dimas Pompa MD on Sonia 3, 2023 09:55 CDT        Electronically signed by Dimas Pompa MD at 23 0956          Physical Therapy Notes (most recent note)        Sim Hernandez PTA at 23 1629  Version 1 of 1         Acute Care - Physical Therapy Treatment Note  [facility]     Patient Name: Jo Ann Lacy  : 1960  MRN: [id number]  Today's Date: 2023      Visit Dx:     ICD-10-CM ICD-9-CM   1. Tubular adenoma of colon  D12.6 211.3   2. Impaired functional mobility, balance, gait, and endurance  Z74.09 V49.89   3. Impaired mobility and ADLs  Z74.09 V49.89    Z78.9      Patient Active Problem List   Diagnosis    Tubular adenoma of colon    Villous adenoma of colon    Benign polyp of large intestine    Essential hypertension    Vitamin D deficiency    Hypertriglyceridemia    Hypercholesteremia    Acute bilateral knee pain    Dyslipidemia    Stress    Anxiety    History of colon polyps     Past Medical History:   Diagnosis Date    Arthritis     Benign polyp of large intestine     Elevated cholesterol     Essential hypertension     Gastroesophageal reflux disease     History of transfusion     Hypertension     Postmenopausal state     Tubular adenoma of colon     Villous adenoma of colon     1.6cm VTA with intermediate grade dysplasia transverse.  1.0cm VTA with intermediate grade dysplasia ascending    Vitamin D deficiency      Past Surgical History:   Procedure Laterality Date    COLONOSCOPY  2016    Many 4-5 mm polyps in the transverse colon and in the ascending colon.Resected and retrieved.External and internal hemorrhoids    COLONOSCOPY N/A 03/10/2023    Procedure: COLONOSCOPY;  Surgeon: Last Miranda MD;   Location: [facility] ENDOSCOPY;  Service: Gastroenterology;  Laterality: N/A;    ECTOPIC PREGNANCY SURGERY      KNEE SURGERY Left     TOTAL ABDOMINAL HYSTERECTOMY WITH SALPINGO OOPHORECTOMY  2013     PT Assessment (last 12 hours)       PT Evaluation and Treatment       Row Name 06/04/23 1434          Physical Therapy Time and Intention    Subjective Information complains of;weakness;pain  -TW     Document Type therapy note (daily note)  -TW     Mode of Treatment physical therapy;individual therapy  -TW     Patient Effort good  -TW       Row Name 06/04/23 1434          General Information    Patient Profile Reviewed yes  -TW     Patient Observations alert;cooperative;agree to therapy  -TW     Patient/Family/Caregiver Comments/Observations none  -TW     General Observations of Patient Pt sup in bed.  -TW     Existing Precautions/Restrictions fall  -TW       Row Name 06/04/23 1434          Pain    Pretreatment Pain Rating 2/10  -TW     Posttreatment Pain Rating 2/10  -TW     Pain Location - Side/Orientation Right  -TW     Pain Location - flank  -TW       Row Name 06/04/23 1434          Cognition    Affect/Mental Status (Cognition) WFL  -TW     Orientation Status (Cognition) oriented x 4  -TW     Follows Commands (Cognition) WFL  -TW     Personal Safety Interventions fall prevention program maintained;nonskid shoes/slippers when out of bed  -TW       Row Name 06/04/23 1434          Bed Mobility    Supine-Sit [place] (Bed Mobility) modified independence  -TW     Sit-Supine [place] (Bed Mobility) modified independence  -TW     Assistive Device (Bed Mobility) head of bed elevated  -TW       Row Name 06/04/23 1434          Sit-Stand Transfer    Sit-Stand [place] (Transfers) supervision  -TW     Assistive Device (Sit-Stand Transfers) other (see comments)  No AD used.  -TW       Row Name 06/04/23 1434          Stand-Sit Transfer    Stand-Sit [place] (Transfers) supervision  -TW     Assistive Device (Stand-Sit  Transfers) other (see comments)  No AD used  -TW       Row Name 06/04/23 1434          Gait/Stairs (Locomotion)    [place] Level (Gait) standby assist  -TW     Assistive Device (Gait) other (see comments)  No AD used.  -TW     Distance in Feet (Gait) 312ft x2  -TW       Row Name 06/04/23 1434          Hip (Therapeutic Exercise)    Hip AROM (Therapeutic Exercise) bilateral;sitting  -TW       Row Name 06/04/23 1434          Knee (Therapeutic Exercise)    Knee AROM (Therapeutic Exercise) bilateral;sitting  -TW       Row Name 06/04/23 1434          Ankle (Therapeutic Exercise)    Ankle AROM (Therapeutic Exercise) bilateral;sitting  -TW       Row Name             Wound 06/01/23 0756 abdomen Incision    Wound - Properties Group Placement Date: 06/01/23  - Placement Time: 0756  -HC Location: abdomen  -HC Primary Wound Type: Incision  -HC    Retired Wound - Properties Group Placement Date: 06/01/23  - Placement Time: 0756  -HC Location: abdomen  -HC Primary Wound Type: Incision  -HC    Retired Wound - Properties Group Date first assessed: 06/01/23  - Time first assessed: 0756  - Location: abdomen  -HC Primary Wound Type: Incision  -HC      Row Name 06/04/23 1434          Plan of Care Review    Plan of Care Reviewed With patient  -TW     Progress improving  -TW     Outcome Evaluation Pt sup in bed and agreeable to therapy. Pt able to t/f to sitting with mod ind with HOB elevated. Pt able to stand ind without AD and amb 312ft x2 with no AD used with occassional unsteadiness but no LOB noted. Pt performed seated B LE ther ex in between gait trials with good tech observed and good tolerance noted. Pt returned to room and to sup in bed with all needs met. Pt with slightly elevated BP after gait. Nsg aware. Pt would cont to benefit from therapy at this time.  -TW       Row Name 06/04/23 1434          Vital Signs    Pre Systolic BP Rehab 151  -TW     Pre Treatment Diastolic BP 73  -TW     Intra Systolic BP Rehab 165   -TW     Intra Treatment Diastolic BP 86  -TW     Post Systolic BP Rehab 160  -TW     Post Treatment Diastolic BP 80  -TW     Pretreatment Heart Rate (beats/min) 82  -TW     Intratreatment Heart Rate (beats/min) 94  -TW     Posttreatment Heart Rate (beats/min) 86  -TW     Pre SpO2 (%) 91  -TW     O2 Delivery Pre Treatment room air  -TW     Intra SpO2 (%) 90  -TW     O2 Delivery Intra Treatment room air  -TW     Post SpO2 (%) 94  -TW     O2 Delivery Post Treatment room air  -TW     Pre Patient Position Supine  -TW     Intra Patient Position Standing  -TW     Post Patient Position Supine  -TW       Row Name 06/04/23 1434          Positioning and Restraints    Pre-Treatment Position in bed  -TW     Post Treatment Position bed  -TW     In Bed supine;call light within reach;encouraged to call for assist;exit alarm on  -TW       Row Name 06/04/23 1434          Therapy Assessment/Plan (PT)    Rehab Potential (PT) good, to achieve stated therapy goals  -TW       Row Name 06/04/23 1434          Bed Mobility Goal 1 (PT)    Activity/Assistive Device (Bed Mobility Goal 1, PT) bed mobility activities, all  -TW     [place] Level/Cues Needed (Bed Mobility Goal 1, PT) modified independence  -TW     Time Frame (Bed Mobility Goal 1, PT) by discharge  -TW     Progress/Outcomes (Bed Mobility Goal 1, PT) goal met   -TW       Row Name 06/04/23 1434          Transfer Goal 1 (PT)    Activity/Assistive Device (Transfer Goal 1, PT) sit-to-stand/stand-to-sit;bed-to-chair/chair-to-bed  -TW     [place] Level/Cues Needed (Transfer Goal 1, PT) modified independence  -TW     Time Frame (Transfer Goal 1, PT) by discharge  -TW     Progress/Outcome (Transfer Goal 1, PT) goal met   -TW       Row Name 06/04/23 1434          Gait Training Goal 1 (PT)    Activity/Assistive Device (Gait Training Goal 1, PT) gait (walking locomotion);increase endurance/gait distance  -TW     [place] Level (Gait Training Goal 1, PT) modified independence  -TW      Distance (Gait Training Goal 1, PT) 25' x 2  -TW     Time Frame (Gait Training Goal 1, PT) by discharge  -TW     Progress/Outcome (Gait Training Goal 1, PT) goal met   -TW       Row Name 06/04/23 1434          Problem Specific Goal 1 (PT)    Problem Specific Goal 1 (PT) Improve LE strength to 4/5  -TW     Time Frame (Problem Specific Goal 1, PT) by discharge  -TW     Progress/Outcome (Problem Specific Goal 1, PT) goal not met  -TW               User Key  (r) = Recorded By, (t) = Taken By, (c) = Cosigned By      Initials Name Provider Type    TW Sim Hernandez PTA Physical Therapist Assistant    Jeanne Tam, RN Registered Nurse                    Physical Therapy Education       Title: PT OT SLP Therapies (In Progress)       Topic: Physical Therapy (In Progress)       Point: Mobility training (Done)       Learning Progress Summary             Patient Acceptance, E, VU,NR by  at 6/1/2023 1308    Comment: POC and goals                         Point: Home exercise program (Not Started)       Learner Progress:  Not documented in this visit.              Point: Body mechanics (Not Started)       Learner Progress:  Not documented in this visit.              Point: Precautions (Not Started)       Learner Progress:  Not documented in this visit.                              User Key       Initials Effective Dates Name Provider Type Discipline     01/09/23 -  Jeanne Dotson PT Physical Therapist PT                  PT Recommendation and Plan  Anticipated Discharge Disposition (PT): home with 24/7 care, home with home health, LT[facility] (long-term care hospital), skilled nursing facility  Plan of Care Reviewed With: patient  Progress: improving  Outcome Evaluation: Pt sup in bed and agreeable to therapy. Pt able to t/f to sitting with mod ind with HOB elevated. Pt able to stand ind without AD and amb 312ft x2 with no AD used with occassional unsteadiness but no LOB noted. Pt performed seated B LE ther ex in  between gait trials with good tech observed and good tolerance noted. Pt returned to room and to sup in bed with all needs met. Pt with slightly elevated BP after gait. Nsg aware. Pt would cont to benefit from therapy at this time.       Time Calculation:    PT Charges       Row Name 06/04/23 1629             Time Calculation    Start Time 1434  -TW      Stop Time 1513  -TW      Time Calculation (min) 39 min  -TW         Time Calculation- PT    Total Timed Code Minutes- PT 39 minute(s)  -TW                User Key  (r) = Recorded By, (t) = Taken By, (c) = Cosigned By      Initials Name Provider Type    TW Sim Hernandez PTA Physical Therapist Assistant                  Therapy Charges for Today       Code Description Service Date Service Provider Modifiers Qty    10473532073 HC GAIT TRAINING EA 15 MIN 6/3/2023 Sim Hernandez, PTA GP 1    95027528611 HC PT THERAPEUTIC ACT EA 15 MIN 6/3/2023 Sim Hernandez, PTA GP 1    19014557682 HC PT THER PROC EA 15 MIN 6/3/2023 Sim Hernandez, PTA GP 1    01668940109 HC GAIT TRAINING EA 15 MIN 6/3/2023 Sim Hernandez, PTA GP 1    67661436476 HC PT THER PROC EA 15 MIN 6/3/2023 Sim Hernandez, PTA GP 1    40902522865 HC PT THERAPEUTIC ACT EA 15 MIN 6/3/2023 Sim Hernandez, PTA GP 2    19045593399 HC PT THER SUPP EA 15 MIN 6/4/2023 Sim Hernandez, PTA GP 1    46459641646 HC PT THER SUPP EA 15 MIN 6/4/2023 Sim Hernandez, PTA GP 1    68637841384 HC GAIT TRAINING EA 15 MIN 6/4/2023 Sim Hernandez, PTA GP 1    57016168235 HC PT THER PROC EA 15 MIN 6/4/2023 Sim Hernandez, PTA GP 1    67340698092 HC PT THERAPEUTIC ACT EA 15 MIN 6/4/2023 Sim Hernandez, PTA GP 1            PT G-Codes  Outcome Measure Options: AM-PAC 6 Clicks Daily Activity (OT)  AM-PAC 6 Clicks Score (PT): 23  AM-PAC 6 Clicks Score (OT): 24    Sim Hernandez PTA  6/4/2023      Electronically signed by Sim Hernandez, PTA at 06/04/23 1732           Occupational Therapy Notes (most recent note)        Rashida Gallegos COTA at 23 1523          Patient Name: Jo Ann Lacy  : 1960    MRN: [id number]                              Today's Date: 2023       Admit Date: 2023    Visit Dx:     ICD-10-CM ICD-9-CM   1. Tubular adenoma of colon  D12.6 211.3   2. Impaired functional mobility, balance, gait, and endurance  Z74.09 V49.89   3. Impaired mobility and ADLs  Z74.09 V49.89    Z78.9      Patient Active Problem List   Diagnosis    Tubular adenoma of colon    Villous adenoma of colon    Benign polyp of large intestine    Essential hypertension    Vitamin D deficiency    Hypertriglyceridemia    Hypercholesteremia    Acute bilateral knee pain    Dyslipidemia    Stress    Anxiety    History of colon polyps     Past Medical History:   Diagnosis Date    Arthritis     Benign polyp of large intestine     Elevated cholesterol     Essential hypertension     Gastroesophageal reflux disease     History of transfusion     Hypertension     Postmenopausal state     Tubular adenoma of colon     Villous adenoma of colon     1.6cm VTA with intermediate grade dysplasia transverse.  1.0cm VTA with intermediate grade dysplasia ascending    Vitamin D deficiency      Past Surgical History:   Procedure Laterality Date    COLONOSCOPY  2016    Many 4-5 mm polyps in the transverse colon and in the ascending colon.Resected and retrieved.External and internal hemorrhoids    COLONOSCOPY N/A 03/10/2023    Procedure: COLONOSCOPY;  Surgeon: Last Miranda MD;  Location: [facility] ENDOSCOPY;  Service: Gastroenterology;  Laterality: N/A;    ECTOPIC PREGNANCY SURGERY      KNEE SURGERY Left     TOTAL ABDOMINAL HYSTERECTOMY WITH SALPINGO OOPHORECTOMY        General Information       Row Name 23 1420 23 1252       OT Time and Intention    Document Type therapy note (daily note)  -LW therapy note (daily note)  -CS    Mode of Treatment individual  therapy;occupational therapy  -LW individual therapy;occupational therapy  -CS      Row Name 06/04/23 1420 06/04/23 1252       General Information    Patient Profile Reviewed yes  -LW yes  -CS    Existing Precautions/Restrictions fall  -LW fall  -CS      Row Name 06/04/23 1420 06/04/23 1252       Cognition    Orientation Status (Cognition) oriented x 4  -LW oriented x 4  -CS      Row Name 06/04/23 1420 06/04/23 1252       Safety Issues, Functional Mobility    Impairments Affecting Function (Mobility) endurance/activity tolerance;pain;strength  -LW endurance/activity tolerance;pain;strength  -CS              User Key  (r) = Recorded By, (t) = Taken By, (c) = Cosigned By      Initials Name Provider Type    CS Rashida Gallegos COTA Occupational Therapist Assistant    Cherise Weaver COTA Occupational Therapist Assistant                     Mobility/ADL's       Row Name 06/04/23 1420 06/04/23 1258       Bed Mobility    Bed Mobility supine-sit;sit-supine  -LW --    Supine-Sit [place] (Bed Mobility) independent  -LW independent  -CS    Sit-Supine [place] (Bed Mobility) independent  -LW independent  -CS    Assistive Device (Bed Mobility) head of bed elevated  -LW head of bed elevated  -CS      Row Name 06/04/23 1420 06/04/23 1258       Transfers    Transfers sit-stand transfer;stand-sit transfer;toilet transfer  -LW sit-stand transfer;stand-sit transfer;toilet transfer  -CS      Row Name 06/04/23 1420 06/04/23 1258       Sit-Stand Transfer    Sit-Stand [place] (Transfers) independent  -LW independent  -CS    Assistive Device (Sit-Stand Transfers) other (see comments)  No AD  -LW --      Row Name 06/04/23 1420 06/04/23 1258       Stand-Sit Transfer    Stand-Sit [place] (Transfers) independent  -LW independent  -CS    Assistive Device (Stand-Sit Transfers) other (see comments)  No AD  -LW --      Row Name 06/04/23 1420 06/04/23 1258       Toilet Transfer    Type (Toilet Transfer)  sit-stand;stand-sit  -LW sit-stand;stand-sit  -CS    [place] Level (Toilet Transfer) independent  -LW independent  -CS    Assistive Device (Toilet Transfer) commode  -LW commode  -CS      Row Name 06/04/23 1420          Functional Mobility    Functional Mobility- Ind. Level conditional independence  -LW     Functional Mobility- Device walker, front-wheeled  -LW     Functional Mobility-Distance (Feet) 350  -LW       Row Name 06/04/23 1420          Activities of Daily Living    BADL Assessment/Intervention grooming;toileting;upper body dressing  -LW       Row Name 06/04/23 1420          Bathing Assessment/Intervention    Comment, (Bathing) Pt stated she already had a bath.  -LW       Row Name 06/04/23 1420          Upper Body Dressing Assessment/Training    [place] Level (Upper Body Dressing) upper body dressing skills;don;independent  Hospital gown  -LW     Position (Upper Body Dressing) supported standing  -LW       Row Name 06/04/23 1420          Grooming Assessment/Training    [place] Level (Grooming) grooming skills;hair care, combing/brushing;oral care regimen;wash face, hands;independent  -LW     Position (Grooming) sink side  -LW     Comment, (Grooming) Patient stood sink side while completing tasks with [place] for ~10 mins  -LW       Row Name 06/04/23 1420          Toileting Assessment/Training    [place] Level (Toileting) toileting skills;adjust/manage clothing;perform perineal hygiene;independent  -LW     Assistive Devices (Toileting) commode;grab bar/safety frame  -LW     Position (Toileting) unsupported sitting  -LW               User Key  (r) = Recorded By, (t) = Taken By, (c) = Cosigned By      Initials Name Provider Type    CS Rashida Gallegos COTA Occupational Therapist Assistant    Cherise Weaver COTA Occupational Therapist Assistant                   Obj/Interventions       Row Name 06/04/23 1436 06/04/23 1252       Shoulder (Therapeutic Exercise)    Shoulder  (Therapeutic Exercise) -- AROM (active range of motion)  -    Shoulder AROM (Therapeutic Exercise) bilateral;flexion;extension;external rotation;internal rotation;10 repetitions;2 sets  -LW bilateral;flexion;extension;external rotation;internal rotation  -      Row Name 06/04/23 1436 06/04/23 1252       Elbow/Forearm (Therapeutic Exercise)    Elbow/Forearm (Therapeutic Exercise) AROM (active range of motion)  -LW AROM (active range of motion)  -    Elbow/Forearm AROM (Therapeutic Exercise) bilateral;flexion;extension;supination;pronation;10 repetitions;2 sets  -LW bilateral;flexion;extension;supination;pronation  -      Row Name 06/04/23 1436 06/04/23 1252       Wrist (Therapeutic Exercise)    Wrist (Therapeutic Exercise) AROM (active range of motion)  -LW AROM (active range of motion)  -    Wrist AROM (Therapeutic Exercise) bilateral;10 repetitions;2 sets  -LW bilateral;flexion;extension  -      Row Name 06/04/23 1436 06/04/23 1252       Hand (Therapeutic Exercise)    Hand (Therapeutic Exercise) AROM (active range of motion)  -LW AROM (active range of motion)  -    Hand AROM/AAROM (Therapeutic Exercise) bilateral;finger flexion;finger extension;finger aBduction;finger aDduction;10 repetitions;2 sets  -LW bilateral;finger flexion;finger extension  -      Row Name 06/04/23 1436 06/04/23 1252       Motor Skills    Therapeutic Exercise shoulder;elbow/forearm;wrist;hand  -LW shoulder;elbow/forearm;wrist;hand  -CS              User Key  (r) = Recorded By, (t) = Taken By, (c) = Cosigned By      Initials Name Provider Type    CS Rashida Gallegos COTA Occupational Therapist Assistant    Cherise Weaver COTA Occupational Therapist Assistant                   Goals/Plan       Row Name 06/04/23 1252 06/04/23 0720       Transfer Goal 1 (OT)    Activity/Assistive Device (Transfer Goal 1, OT) transfers, all  -CS transfers, all  -LW    [place] Level/Cues Needed (Transfer Goal 1, OT) modified  independence  -CS modified independence  -LW    Time Frame (Transfer Goal 1, OT) long term goal (LTG);by discharge  -CS long term goal (LTG);by discharge  -LW    Progress/Outcome (Transfer Goal 1, OT) goal met  -CS goal met   -LW      Row Name 06/04/23 1252 06/04/23 0720       Dressing Goal 1 (OT)    Activity/Device (Dressing Goal 1, OT) dressing skills, all  -CS dressing skills, all  -LW    [place]/Cues Needed (Dressing Goal 1, OT) independent  -CS independent  -LW    Time Frame (Dressing Goal 1, OT) long term goal (LTG);by discharge  -CS long term goal (LTG);by discharge  -LW    Progress/Outcome (Dressing Goal 1, OT) goal not met  -CS goal not met  -LW      Row Name 06/04/23 1252 06/04/23 0720       Toileting Goal 1 (OT)    Activity/Device (Toileting Goal 1, OT) toileting skills, all  -CS toileting skills, all  -LW    [place] Level/Cues Needed (Toileting Goal 1, OT) independent  -CS independent  -LW    Time Frame (Toileting Goal 1, OT) long term goal (LTG);by discharge  -CS long term goal (LTG);by discharge  -LW    Progress/Outcome (Toileting Goal 1, OT) goal met  -CS goal met  -LW      Row Name 06/04/23 1252 06/04/23 0720       Problem Specific Goal 1 (OT)    Problem Specific Goal 1 (OT) Pt will tolerate participating in OOB fxnl activities for ~25 minutes to improve activity tolerance.  -CS Pt will tolerate participating in OOB fxnl activities for ~25 minutes to improve activity tolerance.  -LW    Time Frame (Problem Specific Goal 1, OT) long term goal (LTG);by discharge  -CS long term goal (LTG);by discharge  -LW    Progress/Outcome (Problem Specific Goal 1, OT) goal met  -CS goal met   -LW              User Key  (r) = Recorded By, (t) = Taken By, (c) = Cosigned By      Initials Name Provider Type    CS Rashida Gallegos COTA Occupational Therapist Assistant    Cherise Weaver COTA Occupational Therapist Assistant                   Clinical Impression       Row Name 06/04/23 1428 06/04/23 1257        Pain Assessment    Pretreatment Pain Rating 3/10  -LW 3/10  -CS    Posttreatment Pain Rating 3/10  -LW 3/10  -CS    Pain Location - Side/Orientation Right  -LW --    Pain Location upper  -LW --    Pain Location - abdomen  -LW abdomen  -CS    Pain Intervention(s) Repositioned  -LW Rest;Repositioned  -CS      Row Name 06/04/23 1428 06/04/23 1252       Plan of Care Review    Plan of Care Reviewed With patient  -LW patient  -CS    Progress improving  -LW improving  -CS    Outcome Evaluation Patient supine in bed. UE ROM with 1 lb wt 10X2 completed with good tolerance. Patient needing increased time for tasks for safety. Supine to sit<>sit to supine Independent. Sit to stand<>stand to sit Independent. T/f to commode Independent no R/W. Pt stood sink side while performing grooming with [place] for ~10 minutes. Pt ambulated in hallway with walker over 350 feet. Pt supine in bed all needs in reach.  -LW --      Row Name 06/04/23 1252          Therapy Assessment/Plan (OT)    Rehab Potential (OT) good, to achieve stated therapy goals  -CS     Criteria for Skilled Therapeutic Interventions Met (OT) yes;meets criteria;skilled treatment is necessary  -CS     Therapy Frequency (OT) 2 times/day  -       Row Name 06/04/23 1252          Therapy Plan Review/Discharge Plan (OT)    Anticipated Discharge Disposition (OT) home with assist;home with home health  -CS       Row Name 06/04/23 1252          Vital Signs    Pre Patient Position Supine  -CS     Post Patient Position Sitting  -CS       Row Name 06/04/23 1428 06/04/23 1252       Positioning and Restraints    Pre-Treatment Position in bed  -LW in bed  -CS    Post Treatment Position bed  -LW bed  -CS    In Bed call light within reach;encouraged to call for assist;exit alarm on  -LW sitting;call light within reach;encouraged to call for assist  -CS              User Key  (r) = Recorded By, (t) = Taken By, (c) = Cosigned By      Initials Name Provider Type    MOISES Gallegos  PEGGY Burnette Occupational Therapist Assistant    Cherise Weaver COTA Occupational Therapist Assistant                   Outcome Measures       Row Name 06/04/23 1433 06/04/23 1252       How much help from another is currently needed...    Putting on and taking off regular lower body clothing? 4  -LW 4  -CS    Bathing (including washing, rinsing, and drying) 4  -LW 4  -CS    Toileting (which includes using toilet bed pan or urinal) 4  -LW 4  -CS    Putting on and taking off regular upper body clothing 4  -LW 4  -CS    Taking care of personal grooming (such as brushing teeth) 4  -LW 4  -CS    Eating meals 4  -LW 4  -CS    AM-PAC 6 Clicks Score (OT) 24  -LW 24  -CS      Row Name 06/04/23 1100          How much help from another person do you currently need...    Turning from your back to your side while in flat bed without using bedrails? 4  -LR     Moving from lying on back to sitting on the side of a flat bed without bedrails? 4  -LR     Moving to and from a bed to a chair (including a wheelchair)? 4  -LR     Standing up from a chair using your arms (e.g., wheelchair, bedside chair)? 4  -LR     Climbing 3-5 steps with a railing? 3  -LR     To walk in hospital room? 4  -LR     AM-PAC 6 Clicks Score (PT) 23  -LR     Highest level of mobility 7 --> Walked 25 feet or more  -LR               User Key  (r) = Recorded By, (t) = Taken By, (c) = Cosigned By      Initials Name Provider Type    LR Dana Diaz RN Registered Nurse    Rashida Meyers COTA Occupational Therapist Assistant    Cherise Weaver COTA Occupational Therapist Assistant                    Occupational Therapy Education       Title: PT OT SLP Therapies (In Progress)       Topic: Occupational Therapy (Done)       Point: ADL training (Done)       Description:   Instruct learner(s) on proper safety adaptation and remediation techniques during self care or transfers.   Instruct in proper use of assistive devices.                   Learning Progress Summary             Patient Acceptance, E,TB,D,H, VU by  at 6/4/2023 1520    Comment: HEP and home safety    Acceptance, E,TB, VU by LW at 6/4/2023 1433    Acceptance, E,TB,H, VU,DU by LW at 6/3/2023 1557    Comment: Educated patient on Home safety and EC/WS    Acceptance, E,TB, VU by LW at 6/3/2023 1553    Acceptance, E, VU,NR by  at 6/2/2023 1159    Acceptance, E, VU,NR by  at 6/2/2023 1109    Acceptance, E,TB, VU by  at 6/1/2023 1531    Comment: OT role, POC, t/f training                         Point: Home exercise program (Done)       Description:   Instruct learner(s) on appropriate technique for monitoring, assisting and/or progressing therapeutic exercises/activities.                  Learning Progress Summary             Patient Acceptance, E,TB,D,H, VU by  at 6/4/2023 1520    Comment: HEP and home safety    Acceptance, E,TB, VU by LW at 6/4/2023 1433    Acceptance, E,TB,H, VU,DU by LW at 6/3/2023 1557    Comment: Educated patient on Home safety and EC/WS    Acceptance, E,TB, VU by LW at 6/3/2023 1553                         Point: Precautions (Done)       Description:   Instruct learner(s) on prescribed precautions during self-care and functional transfers.                  Learning Progress Summary             Patient Acceptance, E,TB,D,H, VU by  at 6/4/2023 1520    Comment: HEP and home safety    Acceptance, E,TB, VU by LW at 6/4/2023 1433    Acceptance, E,TB,H, VU,DU by LW at 6/3/2023 1557    Comment: Educated patient on Home safety and EC/WS    Acceptance, E,TB, VU by LW at 6/3/2023 1553    Acceptance, E, VU,NR by  at 6/2/2023 1159    Acceptance, E, VU,NR by  at 6/2/2023 1109    Acceptance, E,TB, VU by  at 6/1/2023 1531    Comment: OT role, POC, t/f training                         Point: Body mechanics (Done)       Description:   Instruct learner(s) on proper positioning and spine alignment during self-care, functional mobility activities and/or exercises.                   Learning Progress Summary             Patient Acceptance, E,TB,D,H, VU by  at 6/4/2023 1520    Comment: HEP and home safety    Acceptance, E,TB, VU by LW at 6/4/2023 1433    Acceptance, E,TB,H, VU,DU by  at 6/3/2023 1557    Comment: Educated patient on Home safety and EC/WS    Acceptance, E,TB, VU by LW at 6/3/2023 1553    Acceptance, E, VU,NR by  at 6/2/2023 1159    Acceptance, E, VU,NR by  at 6/2/2023 1109    Acceptance, E,TB, VU by  at 6/1/2023 1531    Comment: OT role, POC, t/f training                                         User Key       Initials Effective Dates Name Provider Type Discipline     06/16/21 -  Amber Abdalla COTA Occupational Therapist Assistant OT     06/16/21 -  Rashida Gallegos COTA Occupational Therapist Assistant OT     06/16/21 -  Cherise Garza COTA Occupational Therapist Assistant OT     10/19/22 -  Destiney Henriquez, OT Occupational Therapist OT                  OT Recommendation and Plan  Therapy Frequency (OT): 2 times/day  Plan of Care Review  Plan of Care Reviewed With: patient  Progress: improving     Time Calculation:    Time Calculation- OT       Row Name 06/04/23 1521 06/04/23 1434          Time Calculation- OT    OT Start Time 1252  -CS 0720  -LW     OT Stop Time 1338  -CS 0830  -LW     OT Time Calculation (min) 46 min  -CS 70 min  -LW     Total Timed Code Minutes- OT 46 minute(s)  -CS 70 minute(s)  -LW     OT Received On 06/04/23  - 06/04/23  -LW        Timed Charges    09353 - OT Therapeutic Exercise Minutes 25  -CS 25  -LW     30511 - OT Therapeutic Activity Minutes -- 25  -LW     98813 - OT Self Care/Mgmt Minutes 21  -CS 20  -LW        Total Minutes    Timed Charges Total Minutes 46  -CS 70  -LW      Total Minutes 46  -CS 70  -LW               User Key  (r) = Recorded By, (t) = Taken By, (c) = Cosigned By      Initials Name Provider Type    CS Rashida Gallegos COTA Occupational Therapist Assistant    LW Cherise Garza COTA  Occupational Therapist Assistant                  Therapy Charges for Today       Code Description Service Date Service Provider Modifiers Qty    54890486439 HC OT SELF CARE/MGMT/TRAIN EA 15 MIN 6/4/2023 Rashida Gallegos COTA GO 1    94992471610 HC OT THER PROC EA 15 MIN 6/4/2023 Rashida aGllegos COTA GO 2                 PEGGY Vazquez  6/4/2023    Electronically signed by Rashida Gallegos COTA at 06/04/23 1524

## 2023-06-05 NOTE — OUTREACH NOTE
Call Center TCM Note      Flowsheet Row Responses   [facility] patient discharged from? [place]   Does the patient have one of the following disease processes/diagnoses(primary or secondary)? General Surgery   TCM attempt successful? Yes  [no verbal release on file]   Call start time 1209   Call end time 1227   Discharge diagnosis LAPAROSCOPIC ASSISTED RIGHT COLON RESECTION   Meds reviewed with patient/caregiver? Yes   Is the patient having any side effects they believe may be caused by any medication additions or changes? No   Does the patient have all medications related to this admission filled (includes all antibiotics, pain medications, etc.) Yes   Is the patient taking all medications as directed (includes completed medication regime)? Yes   Medication comments Pt does not like to take Oxycodone,  reports she called surgeon office and was told to alternate OTC pain relievers.   Comments PCP FOLLOW UP APPOINTMENT IS 6/16/23@345pm   Does the patient have an appointment with their PCP within 7 days of discharge? Yes   What DME was ordered? [facility] [employer] Walker   Has all DME been delivered? No   DME comments Pt plans on picking up her walker and has called surgeon office to request a showere chair--this RN offered to call DME to check on status of walker but pt declines and wants to make arrangements for  herself.  Reports that DME company told her shower chair would cost her $35   Psychosocial issues? No   Did the patient receive a copy of their discharge instructions? Yes   Nursing interventions Reviewed instructions with patient   What is the patient's perception of their health status since discharge? Improving  [Patient reports surgical type and gas pain but does not like to take narcotics, has called office for advice.  Pt with no BM as of yet.]   Nursing interventions Nurse provided patient education  [Post op care reviewed]   Is the patient /caregiver able to teach back basic  post-op care? Continue use of incentive spirometry at least 1 week post discharge, Practice 'cough and deep breath', No tub bath, swimming, or hot tub until instructed by MD, Keep incision areas clean,dry and protected, Lifting as instructed by MD in discharge instructions   Is the patient/caregiver able to teach back signs and symptoms of incisional infection? Increased redness, swelling or pain at the incisonal site, Increased drainage or bleeding, Incisional warmth, Pus or odor from incision, Fever  [Staples intact, reviewed s/s infection]   Is the patient/caregiver able to teach back steps to recovery at home? Rest and rebuild strength, gradually increase activity   Is the patient/caregiver able to teach back the hierarchy of who to call/visit for symptoms/problems? PCP, Specialist, Home health nurse, Urgent Care, ED, 911 Yes   TCM call completed? Yes   Call end time 1227            Shreya Miranda RN    6/5/2023, 12:31 CDT

## 2023-06-05 NOTE — PAYOR COMM NOTE
"Alida Moore  [facility]  Case Management Extender  [phone] phone  [phone] fax      Auth# [id number]     Jo Ann Tavares (62 y.o. Female)       Date of Birth   1960    Social Security Number       Address   [address] [place] [address]    Home Phone   [phone]    MRN   [id number]       Baptism   [employer]    Marital Status   Single                            Admission Date   6/1/23    Admission Type   Elective    Admitting Provider   Last Miranda MD    Attending Provider       Department, Room/Bed   [address], [facility]/1       Discharge Date   6/4/2023    Discharge Disposition   Home or Self Care    Discharge Destination                                 Attending Provider: (none)   Allergies: Contrast Dye (Echo Or Unknown Ct/mr)    Isolation: None   Infection: None   Code Status: Prior    Ht: 160 cm (63\")   Wt: 60.8 kg (134 lb)    Admission Cmt: None   Principal Problem: Tubular adenoma of colon [D12.6]                   Active Insurance as of 6/1/2023       Primary Coverage       Payor Plan Insurance Group Employer/Plan Group    ANTHEM MEDICAID ANTHEM MEDICAID [id number]       Payor Plan Address Payor Plan Phone Number Payor Plan Fax Number Effective Dates    [address] [address] [phone]  10/14/2022 - None Entered    [facility] [id number]         Subscriber Name Subscriber Birth Date Member ID       JO ANN TAVARES 1960 [id number]                     Emergency Contacts        (Rel.) Home Phone Work Phone Mobile Phone    ABEL HENDERSON (Friend) [phone] -- [phone]    ReginoShahid -- -- [phone]                 Discharge Summary        Dimas Pompa MD at 06/04/23 0837          Discharge Summary    Date of Admission: 6/1/2023  Date of Discharge:  6/4/2023  Service: General Surgery  Attending: Dimas Pompa    Procedures Performed: Procedure(s):  LAPAROSCOPIC " ASSISTED RIGHT COLON RESECTION  Consults:   Consults       No orders found from 5/3/2023 to 6/2/2023.          Admitting Diagnoses: Tubular adenoma of colon [D12.6]   Discharge Diagnoses:   Active Hospital Problems    Diagnosis     **Tubular adenoma of colon        Hospital Course: 62-year-old female with a large tubular adenoma in the ascending colon that was not resectable endoscopically.  She underwent a laparoscopic-assisted right colectomy on 6/1.  Her hospital course was uncomplicated.  At the time of discharge she was tolerating regular diet, her pain was well controlled with p.o. pain medication, she was having bowel function, and she was ambulating with a walker.    Discharge Condition: Postoperatively Stable    Discharge Medications:     Your medication list        START taking these medications        Instructions Last Dose Given Next Dose Due   oxyCODONE 5 MG immediate release tablet  Commonly known as: ROXICODONE      Take 1 tablet by mouth Every 4 (Four) Hours As Needed for Moderate Pain for up to 7 days.              CONTINUE taking these medications        Instructions Last Dose Given Next Dose Due   amLODIPine 5 MG tablet  Commonly known as: NORVASC      Take 1 tablet by mouth Daily.       neomycin 500 MG tablet  Commonly known as: MYCIFRADIN      Take 1 tablet by mouth See Admin Instructions. Take two (2) tablets at 7 PM and two (2) tablets at 11 PM night prior to surgery.       omeprazole 20 MG capsule  Commonly known as: priLOSEC      Take 1 capsule by mouth Daily As Needed.                 Where to Get Your Medications        These medications were sent to [facility]pharm[facility] #[id number] - [place], [place] - [address] [place] [address] - [phone]  - [phone] [address] [address]      Phone: [phone]   oxyCODONE 5 MG immediate release tablet       Vitals:    06/04/23 0323   BP:    Pulse:    Resp:    Temp: 98.9 °F (37.2 °C)   SpO2:      Physical Exam  Constitutional:       Appearance:  Normal appearance.   HENT:      Head: Normocephalic and atraumatic.   Cardiovascular:      Rate and Rhythm: Normal rate and regular rhythm.   Pulmonary:      Effort: Pulmonary effort is normal. No respiratory distress.   Abdominal:      General: There is no distension.      Palpations: Abdomen is soft.      Tenderness: There is no abdominal tenderness.      Comments: Incisions are clean/dry/intact   Neurological:      Mental Status: She is alert.             Regular Diet    Physical Activity: No lifting greater than 20 pounds for the next 2 weeks         Follow-up Appointments  Dr. Miranda in 1 week          This document has been electronically signed by Fahad Pompa MD on June 4, 2023 08:37 CDT                Electronically signed by Fahad Pompa MD at 06/04/23 0840       Discharge Order (From admission, onward)       Start     Ordered    06/04/23 0837  Discharge patient  Once        Expected Discharge Date: 06/04/23    Discharge Disposition: Home or Self Care    Physician of Record for Attribution - Please select from Treatment Team: FAHAD POMPA [[id number]]    Review needed by CMO to determine Physician of Record: No       Question Answer Comment   Physician of Record for Attribution - Please select from Treatment Team FAHAD POMPA    Review needed by CMO to determine Physician of Record No        06/04/23 0865

## 2023-06-14 ENCOUNTER — OFFICE VISIT (OUTPATIENT)
Dept: SURGERY | Facility: CLINIC | Age: 63
End: 2023-06-14
Payer: MEDICAID

## 2023-06-14 VITALS
WEIGHT: 131 LBS | HEART RATE: 74 BPM | TEMPERATURE: 97.5 F | OXYGEN SATURATION: 98 % | DIASTOLIC BLOOD PRESSURE: 70 MMHG | BODY MASS INDEX: 23.21 KG/M2 | HEIGHT: 63 IN | SYSTOLIC BLOOD PRESSURE: 140 MMHG

## 2023-06-14 DIAGNOSIS — D12.6 TUBULAR ADENOMA OF COLON: Primary | Chronic | ICD-10-CM

## 2023-06-14 PROCEDURE — 1160F RVW MEDS BY RX/DR IN RCRD: CPT | Performed by: SURGERY

## 2023-06-14 PROCEDURE — 1159F MED LIST DOCD IN RCRD: CPT | Performed by: SURGERY

## 2023-06-14 PROCEDURE — 3078F DIAST BP <80 MM HG: CPT | Performed by: SURGERY

## 2023-06-14 PROCEDURE — 3077F SYST BP >= 140 MM HG: CPT | Performed by: SURGERY

## 2023-06-14 PROCEDURE — 99024 POSTOP FOLLOW-UP VISIT: CPT | Performed by: SURGERY

## 2023-06-14 NOTE — PROGRESS NOTES
62-year-old female who is now nearly 2 weeks out from her right colon resection for tubular adenomatous polyps.  Gone over pathology with her.  No cancer was present.  No high-grade dysplasia.  Patient is doing well from her surgery.  Incisions are all clean and healing nicely.  We removed staples and placed Steri-Strips.  Abdomen is soft.  Patient can return to work on 10 July without restriction she will follow-up with us in 2 months or sooner if she has any other concerns or questions

## 2023-08-17 ENCOUNTER — HOSPITAL ENCOUNTER (INPATIENT)
Facility: HOSPITAL | Age: 63
LOS: 2 days | Discharge: HOME | DRG: 247 | End: 2023-08-19
Attending: INTERNAL MEDICINE | Admitting: INTERNAL MEDICINE
Payer: MEDICAID

## 2023-08-17 ENCOUNTER — APPOINTMENT (OUTPATIENT)
Dept: GENERAL RADIOLOGY | Facility: HOSPITAL | Age: 63
DRG: 247 | End: 2023-08-17
Payer: MEDICAID

## 2023-08-17 PROBLEM — I25.10 CAD (CORONARY ARTERY DISEASE), NATIVE CORONARY ARTERY: Status: ACTIVE | Noted: 2023-08-17

## 2023-08-17 PROBLEM — I21.3 STEMI (ST ELEVATION MYOCARDIAL INFARCTION): Status: ACTIVE | Noted: 2023-08-17

## 2023-08-17 PROBLEM — I21.21 ST ELEVATION MYOCARDIAL INFARCTION INVOLVING LEFT CIRCUMFLEX CORONARY ARTERY: Status: ACTIVE | Noted: 2023-08-17

## 2023-08-17 LAB
ALBUMIN SERPL-MCNC: 3.9 G/DL (ref 3.5–5.2)
ALBUMIN/GLOB SERPL: 1 G/DL
ALP SERPL-CCNC: 81 U/L (ref 39–117)
ALT SERPL W P-5'-P-CCNC: 20 U/L (ref 1–33)
ANION GAP SERPL CALCULATED.3IONS-SCNC: 13 MMOL/L (ref 5–15)
AST SERPL-CCNC: 68 U/L (ref 1–32)
BASOPHILS # BLD AUTO: 0.03 10*3/MM3 (ref 0–0.2)
BASOPHILS NFR BLD AUTO: 0.3 % (ref 0–1.5)
BILIRUB SERPL-MCNC: 0.3 MG/DL (ref 0–1.2)
BUN SERPL-MCNC: 11 MG/DL (ref 8–23)
BUN/CREAT SERPL: 20.8 (ref 7–25)
CALCIUM SPEC-SCNC: 9.1 MG/DL (ref 8.6–10.5)
CHLORIDE SERPL-SCNC: 100 MMOL/L (ref 98–107)
CO2 SERPL-SCNC: 19 MMOL/L (ref 22–29)
CREAT SERPL-MCNC: 0.53 MG/DL (ref 0.57–1)
DEPRECATED RDW RBC AUTO: 47.5 FL (ref 37–54)
EGFRCR SERPLBLD CKD-EPI 2021: 103.4 ML/MIN/1.73
EOSINOPHIL # BLD AUTO: 0 10*3/MM3 (ref 0–0.4)
EOSINOPHIL NFR BLD AUTO: 0 % (ref 0.3–6.2)
ERYTHROCYTE [DISTWIDTH] IN BLOOD BY AUTOMATED COUNT: 13.5 % (ref 12.3–15.4)
GEN 5 2HR TROPONIN T REFLEX: 1811 NG/L
GLOBULIN UR ELPH-MCNC: 3.8 GM/DL
GLUCOSE SERPL-MCNC: 106 MG/DL (ref 65–99)
HCT VFR BLD AUTO: 36.9 % (ref 34–46.6)
HGB BLD-MCNC: 12.3 G/DL (ref 12–15.9)
IMM GRANULOCYTES # BLD AUTO: 0.05 10*3/MM3 (ref 0–0.05)
IMM GRANULOCYTES NFR BLD AUTO: 0.5 % (ref 0–0.5)
LYMPHOCYTES # BLD AUTO: 0.94 10*3/MM3 (ref 0.7–3.1)
LYMPHOCYTES NFR BLD AUTO: 8.9 % (ref 19.6–45.3)
MAGNESIUM SERPL-MCNC: 2.4 MG/DL (ref 1.6–2.4)
MCH RBC QN AUTO: 31.9 PG (ref 26.6–33)
MCHC RBC AUTO-ENTMCNC: 33.3 G/DL (ref 31.5–35.7)
MCV RBC AUTO: 95.6 FL (ref 79–97)
MONOCYTES # BLD AUTO: 0.15 10*3/MM3 (ref 0.1–0.9)
MONOCYTES NFR BLD AUTO: 1.4 % (ref 5–12)
NEUTROPHILS NFR BLD AUTO: 88.9 % (ref 42.7–76)
NEUTROPHILS NFR BLD AUTO: 9.34 10*3/MM3 (ref 1.7–7)
NRBC BLD AUTO-RTO: 0 /100 WBC (ref 0–0.2)
PLATELET # BLD AUTO: 317 10*3/MM3 (ref 140–450)
PMV BLD AUTO: 9.1 FL (ref 6–12)
POTASSIUM SERPL-SCNC: 4.3 MMOL/L (ref 3.5–5.2)
PROT SERPL-MCNC: 7.7 G/DL (ref 6–8.5)
QT INTERVAL: 362 MS
QTC INTERVAL: 438 MS
RBC # BLD AUTO: 3.86 10*6/MM3 (ref 3.77–5.28)
SODIUM SERPL-SCNC: 132 MMOL/L (ref 136–145)
TROPONIN T DELTA: -17 NG/L
TROPONIN T SERPL HS-MCNC: 1828 NG/L
WBC NRBC COR # BLD: 10.51 10*3/MM3 (ref 3.4–10.8)

## 2023-08-17 PROCEDURE — 80053 COMPREHEN METABOLIC PANEL: CPT | Performed by: INTERNAL MEDICINE

## 2023-08-17 PROCEDURE — C1725 CATH, TRANSLUMIN NON-LASER: HCPCS | Performed by: INTERNAL MEDICINE

## 2023-08-17 PROCEDURE — 85025 COMPLETE CBC W/AUTO DIFF WBC: CPT | Performed by: INTERNAL MEDICINE

## 2023-08-17 PROCEDURE — 93458 L HRT ARTERY/VENTRICLE ANGIO: CPT | Performed by: INTERNAL MEDICINE

## 2023-08-17 PROCEDURE — 25010000002 MAGNESIUM SULFATE IN D5W 1G/100ML (PREMIX) 1-5 GM/100ML-% SOLUTION: Performed by: INTERNAL MEDICINE

## 2023-08-17 PROCEDURE — C9606 PERC D-E COR REVASC W AMI S: HCPCS | Performed by: INTERNAL MEDICINE

## 2023-08-17 PROCEDURE — C1769 GUIDE WIRE: HCPCS | Performed by: INTERNAL MEDICINE

## 2023-08-17 PROCEDURE — C1887 CATHETER, GUIDING: HCPCS | Performed by: INTERNAL MEDICINE

## 2023-08-17 PROCEDURE — 25010000002 MIDAZOLAM PER 1 MG: Performed by: INTERNAL MEDICINE

## 2023-08-17 PROCEDURE — 25010000002 HEPARIN (PORCINE) PER 1000 UNITS: Performed by: INTERNAL MEDICINE

## 2023-08-17 PROCEDURE — 25010000002 FENTANYL CITRATE (PF) 50 MCG/ML SOLUTION: Performed by: INTERNAL MEDICINE

## 2023-08-17 PROCEDURE — 25010000002 EPTIFIBATIDE 20 MG/10ML SOLUTION: Performed by: INTERNAL MEDICINE

## 2023-08-17 PROCEDURE — C1894 INTRO/SHEATH, NON-LASER: HCPCS | Performed by: INTERNAL MEDICINE

## 2023-08-17 PROCEDURE — 4A023N7 MEASUREMENT OF CARDIAC SAMPLING AND PRESSURE, LEFT HEART, PERCUTANEOUS APPROACH: ICD-10-PCS | Performed by: INTERNAL MEDICINE

## 2023-08-17 PROCEDURE — B2111ZZ FLUOROSCOPY OF MULTIPLE CORONARY ARTERIES USING LOW OSMOLAR CONTRAST: ICD-10-PCS | Performed by: INTERNAL MEDICINE

## 2023-08-17 PROCEDURE — 25010000002 ATROPINE PER 0.01 MG: Performed by: INTERNAL MEDICINE

## 2023-08-17 PROCEDURE — 71045 X-RAY EXAM CHEST 1 VIEW: CPT

## 2023-08-17 PROCEDURE — C1874 STENT, COATED/COV W/DEL SYS: HCPCS | Performed by: INTERNAL MEDICINE

## 2023-08-17 PROCEDURE — 93005 ELECTROCARDIOGRAM TRACING: CPT | Performed by: INTERNAL MEDICINE

## 2023-08-17 PROCEDURE — 25010000002 PHENYLEPHRINE 10 MG/ML SOLUTION: Performed by: INTERNAL MEDICINE

## 2023-08-17 PROCEDURE — 25010000002 NITROGLYCERIN 5 MG/ML SOLUTION: Performed by: INTERNAL MEDICINE

## 2023-08-17 PROCEDURE — 83735 ASSAY OF MAGNESIUM: CPT | Performed by: INTERNAL MEDICINE

## 2023-08-17 PROCEDURE — 25010000002 METHYLPREDNISOLONE PER 125 MG: Performed by: INTERNAL MEDICINE

## 2023-08-17 PROCEDURE — 99223 1ST HOSP IP/OBS HIGH 75: CPT | Performed by: INTERNAL MEDICINE

## 2023-08-17 PROCEDURE — 25010000002 DIPHENHYDRAMINE PER 50 MG: Performed by: INTERNAL MEDICINE

## 2023-08-17 PROCEDURE — B2151ZZ FLUOROSCOPY OF LEFT HEART USING LOW OSMOLAR CONTRAST: ICD-10-PCS | Performed by: INTERNAL MEDICINE

## 2023-08-17 PROCEDURE — 25510000001 IOPAMIDOL PER 1 ML: Performed by: INTERNAL MEDICINE

## 2023-08-17 PROCEDURE — 92941 PRQ TRLML REVSC TOT OCCL AMI: CPT | Performed by: INTERNAL MEDICINE

## 2023-08-17 PROCEDURE — 84484 ASSAY OF TROPONIN QUANT: CPT | Performed by: INTERNAL MEDICINE

## 2023-08-17 PROCEDURE — 027034Z DILATION OF CORONARY ARTERY, ONE ARTERY WITH DRUG-ELUTING INTRALUMINAL DEVICE, PERCUTANEOUS APPROACH: ICD-10-PCS | Performed by: INTERNAL MEDICINE

## 2023-08-17 DEVICE — STNT CORNRY RESOLUTE ONYX RX 2X30MM: Type: IMPLANTABLE DEVICE | Site: CORONARY | Status: FUNCTIONAL

## 2023-08-17 RX ORDER — PANTOPRAZOLE SODIUM 40 MG/1
40 TABLET, DELAYED RELEASE ORAL
Status: DISCONTINUED | OUTPATIENT
Start: 2023-08-18 | End: 2023-08-19 | Stop reason: HOSPADM

## 2023-08-17 RX ORDER — PHENYLEPHRINE HYDROCHLORIDE 10 MG/ML
INJECTION INTRAVENOUS
Status: DISCONTINUED | OUTPATIENT
Start: 2023-08-17 | End: 2023-08-17 | Stop reason: HOSPADM

## 2023-08-17 RX ORDER — ROSUVASTATIN CALCIUM 10 MG/1
40 TABLET, COATED ORAL NIGHTLY
Status: DISCONTINUED | OUTPATIENT
Start: 2023-08-17 | End: 2023-08-19 | Stop reason: HOSPADM

## 2023-08-17 RX ORDER — NITROGLYCERIN 5 MG/ML
INJECTION, SOLUTION INTRAVENOUS
Status: DISCONTINUED | OUTPATIENT
Start: 2023-08-17 | End: 2023-08-17 | Stop reason: HOSPADM

## 2023-08-17 RX ORDER — BISACODYL 10 MG
10 SUPPOSITORY, RECTAL RECTAL DAILY PRN
Status: DISCONTINUED | OUTPATIENT
Start: 2023-08-17 | End: 2023-08-19 | Stop reason: HOSPADM

## 2023-08-17 RX ORDER — SODIUM CHLORIDE 0.9 % (FLUSH) 0.9 %
10 SYRINGE (ML) INJECTION EVERY 12 HOURS SCHEDULED
Status: DISCONTINUED | OUTPATIENT
Start: 2023-08-17 | End: 2023-08-19 | Stop reason: HOSPADM

## 2023-08-17 RX ORDER — AMOXICILLIN 250 MG
2 CAPSULE ORAL 2 TIMES DAILY
Status: DISCONTINUED | OUTPATIENT
Start: 2023-08-17 | End: 2023-08-19 | Stop reason: HOSPADM

## 2023-08-17 RX ORDER — MIDAZOLAM HYDROCHLORIDE 1 MG/ML
INJECTION INTRAMUSCULAR; INTRAVENOUS
Status: DISCONTINUED | OUTPATIENT
Start: 2023-08-17 | End: 2023-08-17 | Stop reason: HOSPADM

## 2023-08-17 RX ORDER — SODIUM CHLORIDE 9 MG/ML
40 INJECTION, SOLUTION INTRAVENOUS AS NEEDED
Status: DISCONTINUED | OUTPATIENT
Start: 2023-08-17 | End: 2023-08-19 | Stop reason: HOSPADM

## 2023-08-17 RX ORDER — HEPARIN SODIUM 1000 [USP'U]/ML
INJECTION, SOLUTION INTRAVENOUS; SUBCUTANEOUS
Status: DISCONTINUED | OUTPATIENT
Start: 2023-08-17 | End: 2023-08-17 | Stop reason: HOSPADM

## 2023-08-17 RX ORDER — ASPIRIN 81 MG/1
81 TABLET ORAL DAILY
Status: DISCONTINUED | OUTPATIENT
Start: 2023-08-18 | End: 2023-08-19 | Stop reason: HOSPADM

## 2023-08-17 RX ORDER — TRAZODONE HYDROCHLORIDE 50 MG/1
50 TABLET ORAL NIGHTLY
Status: DISCONTINUED | OUTPATIENT
Start: 2023-08-17 | End: 2023-08-19 | Stop reason: HOSPADM

## 2023-08-17 RX ORDER — POLYETHYLENE GLYCOL 3350 17 G/17G
17 POWDER, FOR SOLUTION ORAL DAILY PRN
Status: DISCONTINUED | OUTPATIENT
Start: 2023-08-17 | End: 2023-08-19 | Stop reason: HOSPADM

## 2023-08-17 RX ORDER — BISACODYL 5 MG/1
5 TABLET, DELAYED RELEASE ORAL DAILY PRN
Status: DISCONTINUED | OUTPATIENT
Start: 2023-08-17 | End: 2023-08-19 | Stop reason: HOSPADM

## 2023-08-17 RX ORDER — NITROGLYCERIN 0.4 MG/1
0.4 TABLET SUBLINGUAL
Status: DISCONTINUED | OUTPATIENT
Start: 2023-08-17 | End: 2023-08-19 | Stop reason: HOSPADM

## 2023-08-17 RX ORDER — EPTIFIBATIDE 2 MG/ML
INJECTION, SOLUTION INTRAVENOUS
Status: DISCONTINUED | OUTPATIENT
Start: 2023-08-17 | End: 2023-08-17 | Stop reason: HOSPADM

## 2023-08-17 RX ORDER — HEPARIN SODIUM 5000 [USP'U]/ML
5000 INJECTION, SOLUTION INTRAVENOUS; SUBCUTANEOUS EVERY 12 HOURS SCHEDULED
Status: DISCONTINUED | OUTPATIENT
Start: 2023-08-18 | End: 2023-08-19 | Stop reason: HOSPADM

## 2023-08-17 RX ORDER — LIDOCAINE HYDROCHLORIDE 20 MG/ML
INJECTION, SOLUTION INFILTRATION; PERINEURAL
Status: DISCONTINUED | OUTPATIENT
Start: 2023-08-17 | End: 2023-08-17 | Stop reason: HOSPADM

## 2023-08-17 RX ORDER — FENTANYL CITRATE 50 UG/ML
INJECTION, SOLUTION INTRAMUSCULAR; INTRAVENOUS
Status: DISCONTINUED | OUTPATIENT
Start: 2023-08-17 | End: 2023-08-17 | Stop reason: HOSPADM

## 2023-08-17 RX ORDER — ATROPINE SULFATE 1 MG/ML
INJECTION, SOLUTION INTRAMUSCULAR; INTRAVENOUS; SUBCUTANEOUS
Status: DISCONTINUED | OUTPATIENT
Start: 2023-08-17 | End: 2023-08-17 | Stop reason: HOSPADM

## 2023-08-17 RX ORDER — SODIUM CHLORIDE 0.9 % (FLUSH) 0.9 %
10 SYRINGE (ML) INJECTION AS NEEDED
Status: DISCONTINUED | OUTPATIENT
Start: 2023-08-17 | End: 2023-08-18

## 2023-08-17 RX ORDER — MAGNESIUM SULFATE 1 G/100ML
INJECTION INTRAVENOUS
Status: COMPLETED | OUTPATIENT
Start: 2023-08-17 | End: 2023-08-17

## 2023-08-17 RX ORDER — METHYLPREDNISOLONE SODIUM SUCCINATE 125 MG/2ML
INJECTION, POWDER, LYOPHILIZED, FOR SOLUTION INTRAMUSCULAR; INTRAVENOUS
Status: DISCONTINUED | OUTPATIENT
Start: 2023-08-17 | End: 2023-08-17 | Stop reason: HOSPADM

## 2023-08-17 RX ORDER — LISINOPRIL 5 MG/1
5 TABLET ORAL DAILY
Status: DISCONTINUED | OUTPATIENT
Start: 2023-08-17 | End: 2023-08-19 | Stop reason: HOSPADM

## 2023-08-17 RX ORDER — SODIUM CHLORIDE 9 MG/ML
125 INJECTION, SOLUTION INTRAVENOUS CONTINUOUS
Status: ACTIVE | OUTPATIENT
Start: 2023-08-17 | End: 2023-08-17

## 2023-08-17 RX ORDER — DIPHENHYDRAMINE HYDROCHLORIDE 50 MG/ML
INJECTION INTRAMUSCULAR; INTRAVENOUS
Status: DISCONTINUED | OUTPATIENT
Start: 2023-08-17 | End: 2023-08-17 | Stop reason: HOSPADM

## 2023-08-17 RX ORDER — ACETAMINOPHEN 325 MG/1
650 TABLET ORAL EVERY 4 HOURS PRN
Status: DISCONTINUED | OUTPATIENT
Start: 2023-08-17 | End: 2023-08-19 | Stop reason: HOSPADM

## 2023-08-17 RX ADMIN — METOPROLOL TARTRATE 25 MG: 25 TABLET, FILM COATED ORAL at 20:14

## 2023-08-17 RX ADMIN — SODIUM CHLORIDE 125 ML/HR: 9 INJECTION, SOLUTION INTRAVENOUS at 15:45

## 2023-08-17 RX ADMIN — TRAZODONE HYDROCHLORIDE 50 MG: 50 TABLET ORAL at 20:14

## 2023-08-17 RX ADMIN — Medication 10 ML: at 20:17

## 2023-08-17 RX ADMIN — DOCUSATE SODIUM 50 MG AND SENNOSIDES 8.6 MG 2 TABLET: 8.6; 5 TABLET, FILM COATED ORAL at 20:14

## 2023-08-17 RX ADMIN — ROSUVASTATIN CALCIUM 40 MG: 10 TABLET, FILM COATED ORAL at 20:14

## 2023-08-17 RX ADMIN — LISINOPRIL 5 MG: 5 TABLET ORAL at 16:33

## 2023-08-17 NOTE — POST-PROCEDURE NOTE
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

## 2023-08-17 NOTE — Clinical Note
First balloon inflation max pressure = 8 radha. First balloon inflation duration = 12 seconds. Second inflation of balloon - Max pressure = 8 radha. 2nd Inflation of balloon - Duration = 14 seconds.

## 2023-08-17 NOTE — Clinical Note
First balloon inflation max pressure = 16 radha. First balloon inflation duration = 22 seconds. Second inflation of balloon - Max pressure = 16 radha. 2nd Inflation of balloon - Duration = 22 seconds. Third inflation of balloon - Max pressure = 14 radha. 3rd Inflation of balloon - Duration = 15 seconds.

## 2023-08-17 NOTE — Clinical Note
Hemostasis started on the right radial artery. R-Band was used in achieving hemostasis. Radial compression device applied to vessel. Hemostasis achieved successfully. Closure device additional comment:    14   ML OF AIR

## 2023-08-17 NOTE — H&P
[facility] Cardiology  HISTORY AND PHYSICAL  Jo Ann Lacy  62 y.o. female    Chief complaint -chest pressure    Date of admission: 8/17/2023    History of Present Illness:    The patient is a 62-year-old female who presented to [facility] ER earlier today as a STEMI activation from the EMS.  The patient reports having central chest pressure that started 4 days ago.  She noted significant worsening of her symptoms earlier today and decided to seek medical attention.  ECG done by the EMS showed ST elevations in inferior and lateral leads suggestive of STEMI.  The patient was administered contrast allergy premedication prior to the procedure due to reported dye allergy.  Emergent coronary angiography was performed via right radial artery approach upon arrival and demonstrated culprit occlusion of the OM branch of LCx.  This was successfully revascularized with placement of one drug-eluting stent with restoration of IONA-3 flow to the vessel and reduction in stenosis to 0%.  She was loaded with Brilinta in the Cath Lab.  She received loading dose of aspirin prior to Cath Lab arrival.  She is being admitted to the CCU for further management.            Allergies   Allergen Reactions    Contrast Dye (Echo Or Unknown Ct/Mr) Swelling         Past Medical History:   Diagnosis Date    Arthritis     Benign polyp of large intestine     Elevated cholesterol     Essential hypertension     Gastroesophageal reflux disease     History of transfusion     Hypertension     Postmenopausal state     Tubular adenoma of colon     Villous adenoma of colon     1.6cm VTA with intermediate grade dysplasia transverse.  1.0cm VTA with intermediate grade dysplasia ascending    Vitamin D deficiency          Past Surgical History:   Procedure Laterality Date    COLON RESECTION N/A 6/1/2023    Procedure: LAPAROSCOPIC ASSISTED RIGHT COLON RESECTION;  Surgeon: Last Miranda MD;  Location: [facility] OR;  Service: General;  Laterality:  N/A;    COLONOSCOPY  05/16/2016    Many 4-5 mm polyps in the transverse colon and in the ascending colon.Resected and retrieved.External and internal hemorrhoids    COLONOSCOPY N/A 03/10/2023    Procedure: COLONOSCOPY;  Surgeon: Last Miranda MD;  Location: [facility] ENDOSCOPY;  Service: Gastroenterology;  Laterality: N/A;    ECTOPIC PREGNANCY SURGERY      KNEE SURGERY Left     TOTAL ABDOMINAL HYSTERECTOMY WITH SALPINGO OOPHORECTOMY  2013         Family History   Problem Relation Age of Onset    Stroke Mother     Cataracts Mother     Hypertension Mother     Heart disease Father     Diabetes Father     Obesity Father     Glaucoma Father     Hypertension Father          Social History     Socioeconomic History    Marital status: Single   Tobacco Use    Smoking status: Every Day     Packs/day: 0.50     Years: 45.00     Pack years: 22.50     Types: Cigarettes     Start date: 1975     Passive exposure: Current    Smokeless tobacco: Never   Vaping Use    Vaping Use: Never used   Substance and Sexual Activity    Alcohol use: No    Drug use: No    Sexual activity: Defer     Birth control/protection: Surgical         Prior to Admission medications    Medication Sig Start Date End Date Taking? Authorizing Provider   amLODIPine (NORVASC) 5 MG tablet Take 1 tablet by mouth Daily. 6/16/23   Spring Hollins DNP, APRN   omeprazole (priLOSEC) 20 MG capsule Take 1 capsule by mouth Daily. 6/16/23   Spring Hollins DNP, APRN   rosuvastatin (Crestor) 20 MG tablet Take 1 tablet by mouth Every Night. 6/16/23   Spring Hollins DNP, APRN   traMADol (ULTRAM) 50 MG tablet Take 1 tablet by mouth Every 6 (Six) Hours As Needed for Moderate Pain. 7/3/23   Marcelo Wang MD   traZODone (DESYREL) 50 MG tablet Take 1 tablet by mouth Every Night. 6/16/23   Spring Hollins DNP, APRN   vitamin D (ERGOCALCIFEROL) 1.25 MG (34741 UT) capsule capsule Take 1 capsule by mouth 1 (One) Time Per Week. 6/16/23   Spring Hollins DNP, APRN     Review of Systems    Constitutional:  Negative for chills and fever.   HENT:  Negative for ear discharge, ear pain and hearing loss.    Eyes:  Negative for discharge and itching.   Respiratory:  Positive for shortness of breath. Negative for cough.    Cardiovascular:  Positive for chest pain. Negative for palpitations and leg swelling.   Gastrointestinal:  Negative for abdominal pain and diarrhea.   Endocrine: Negative for cold intolerance and heat intolerance.   Genitourinary:  Negative for flank pain and hematuria.   Musculoskeletal:  Negative for joint swelling and neck pain.   Skin:  Negative for pallor and bruise.   Allergic/Immunologic: Negative for environmental allergies and food allergies.   Neurological:  Negative for speech difficulty and headache.   Hematological:  Does not bruise/bleed easily.   Psychiatric/Behavioral:  Negative for hallucinations and depressed mood.           OBJECTIVE:    There were no vitals filed for this visit.     LMP 07/10/2017     Physical Exam  Constitutional:       General: She is in acute distress.   HENT:      Head: Normocephalic.      Right Ear: External ear normal.      Left Ear: External ear normal.      Nose: No congestion or rhinorrhea.      Mouth/Throat:      Mouth: Mucous membranes are moist.      Pharynx: No posterior oropharyngeal erythema.   Eyes:      General:         Right eye: No discharge.         Left eye: No discharge.   Cardiovascular:      Rate and Rhythm: Normal rate and regular rhythm.   Pulmonary:      Effort: No respiratory distress.      Breath sounds: No wheezing.   Abdominal:      General: There is no distension.      Palpations: Abdomen is soft.   Musculoskeletal:      Cervical back: Neck supple. No rigidity.      Right lower leg: No edema.      Left lower leg: No edema.   Skin:     General: Skin is warm.      Coloration: Skin is not jaundiced.   Neurological:      Mental Status: She is alert. Mental status is at baseline.   Psychiatric:         Mood and Affect: Mood  normal.         Thought Content: Thought content normal.         Judgment: Judgment normal.          Lab Results (last 24 hours)       ** No results found for the last 24 hours. **                The ASCVD Risk score (Sourav DK, et al., 2019) failed to calculate for the following reasons:    The patient has a prior MI or stroke diagnosis        Assessment:    -Inferior STEMI, s/p successful revascularization of culprit occlusion of OM2 branch of LCx with MIN  -Severe CAD involving OM1 branch of Lcx  -Anomalous origin of the RCA   -Tobacco use    Recommendations:  -Admit to the CCU for further management  -Continue dual antiplatelet therapy with aspirin and Brilinta  -Initiate high intensity statin therapy with Crestor  -Initiate beta-blocker therapy  -Initiate ACE inhibitor therapy  -Further risk stratification with A1c and lipid profile  -Continue to trend serum troponins until downtrending is noted  -Telemetry monitoring  -Cardiac rehab consultation  -Obtain a transthoracic echocardiogram to evaluate LVEF and look for other underlying structural heart disease  -Consider obtaining a coronary CTA to further evaluate the origin and course of the RCA  -Cessation of tobacco use was discussed with the patient    DVT PPx: SC heparin  GI PPx: Oral Protonix and diet    CODE STATUS: Full    BMI is within normal parameters. No other follow-up for BMI required.      Jo Ann Lacy  reports that she has been smoking cigarettes. She started smoking about 48 years ago. She has a 22.50 pack-year smoking history. She has been exposed to tobacco smoke. She has never used smokeless tobacco.. I have educated her on the risk of diseases from using tobacco products such as cancer, COPD, and heart disease.     I advised her to quit and she will think about it    I spent 3  minutes counseling the patient.       Haroldo Zimmerman MD  8/17/2023  14:26 CDT      Part of this note may be an electronic transcription/translation of spoken  language to printed text using the Dragon Dictation System.

## 2023-08-18 ENCOUNTER — APPOINTMENT (OUTPATIENT)
Dept: CARDIOLOGY | Facility: HOSPITAL | Age: 63
DRG: 247 | End: 2023-08-18
Payer: MEDICAID

## 2023-08-18 LAB
ANION GAP SERPL CALCULATED.3IONS-SCNC: 13 MMOL/L (ref 5–15)
BH CV ECHO LEFT VENTRICLE GLOBAL LONGITUDINAL STRAIN: -16 %
BH CV ECHO MEAS - ACS: 1.89 CM
BH CV ECHO MEAS - AO MAX PG: 8 MMHG
BH CV ECHO MEAS - AO MEAN PG: 4 MMHG
BH CV ECHO MEAS - AO ROOT DIAM: 3.5 CM
BH CV ECHO MEAS - AO V2 MAX: 141 CM/SEC
BH CV ECHO MEAS - AO V2 VTI: 32.2 CM
BH CV ECHO MEAS - AVA(I,D): 2.3 CM2
BH CV ECHO MEAS - EDV(CUBED): 105.8 ML
BH CV ECHO MEAS - EDV(MOD-SP2): 79.2 ML
BH CV ECHO MEAS - EDV(MOD-SP4): 65.9 ML
BH CV ECHO MEAS - EF(MOD-BP): 46.6 %
BH CV ECHO MEAS - EF(MOD-SP2): 51.6 %
BH CV ECHO MEAS - EF(MOD-SP4): 42.2 %
BH CV ECHO MEAS - ESV(CUBED): 19.1 ML
BH CV ECHO MEAS - ESV(MOD-SP2): 38.3 ML
BH CV ECHO MEAS - ESV(MOD-SP4): 38.1 ML
BH CV ECHO MEAS - FS: 43.5 %
BH CV ECHO MEAS - IVS/LVPW: 0.8 CM
BH CV ECHO MEAS - IVSD: 0.89 CM
BH CV ECHO MEAS - LA DIMENSION: 3 CM
BH CV ECHO MEAS - LAT PEAK E' VEL: 11.4 CM/SEC
BH CV ECHO MEAS - LV DIASTOLIC VOL/BSA (35-75): 40.5 CM2
BH CV ECHO MEAS - LV MASS(C)D: 165.5 GRAMS
BH CV ECHO MEAS - LV MAX PG: 2.8 MMHG
BH CV ECHO MEAS - LV MEAN PG: 1.27 MMHG
BH CV ECHO MEAS - LV SYSTOLIC VOL/BSA (12-30): 23.4 CM2
BH CV ECHO MEAS - LV V1 MAX: 84.3 CM/SEC
BH CV ECHO MEAS - LV V1 VTI: 20.2 CM
BH CV ECHO MEAS - LVIDD: 4.7 CM
BH CV ECHO MEAS - LVIDS: 2.7 CM
BH CV ECHO MEAS - LVOT AREA: 3.7 CM2
BH CV ECHO MEAS - LVOT DIAM: 2.16 CM
BH CV ECHO MEAS - LVPWD: 1.11 CM
BH CV ECHO MEAS - MED PEAK E' VEL: 9.2 CM/SEC
BH CV ECHO MEAS - MR MAX PG: 42.5 MMHG
BH CV ECHO MEAS - MR MAX VEL: 325.8 CM/SEC
BH CV ECHO MEAS - MV A MAX VEL: 82.7 CM/SEC
BH CV ECHO MEAS - MV DEC SLOPE: 369 CM/SEC2
BH CV ECHO MEAS - MV DEC TIME: 0.2 MSEC
BH CV ECHO MEAS - MV E MAX VEL: 82.7 CM/SEC
BH CV ECHO MEAS - MV E/A: 1
BH CV ECHO MEAS - MV MAX PG: 4.5 MMHG
BH CV ECHO MEAS - MV MEAN PG: 1.27 MMHG
BH CV ECHO MEAS - MV P1/2T: 82.5 MSEC
BH CV ECHO MEAS - MV V2 VTI: 30 CM
BH CV ECHO MEAS - MVA(P1/2T): 2.7 CM2
BH CV ECHO MEAS - MVA(VTI): 2.47 CM2
BH CV ECHO MEAS - PA V2 MAX: 67.8 CM/SEC
BH CV ECHO MEAS - RAP SYSTOLE: 8 MMHG
BH CV ECHO MEAS - RVDD: 2.14 CM
BH CV ECHO MEAS - RVSP: 25 MMHG
BH CV ECHO MEAS - SI(MOD-SP2): 25.2 ML/M2
BH CV ECHO MEAS - SI(MOD-SP4): 17.1 ML/M2
BH CV ECHO MEAS - SV(LVOT): 74 ML
BH CV ECHO MEAS - SV(MOD-SP2): 40.9 ML
BH CV ECHO MEAS - SV(MOD-SP4): 27.8 ML
BH CV ECHO MEAS - TAPSE (>1.6): 2.22 CM
BH CV ECHO MEAS - TR MAX PG: 17 MMHG
BH CV ECHO MEAS - TR MAX VEL: 205 CM/SEC
BH CV ECHO MEASUREMENTS AVERAGE E/E' RATIO: 8.03
BUN SERPL-MCNC: 16 MG/DL (ref 8–23)
BUN/CREAT SERPL: 20.8 (ref 7–25)
CALCIUM SPEC-SCNC: 9.5 MG/DL (ref 8.6–10.5)
CHLORIDE SERPL-SCNC: 106 MMOL/L (ref 98–107)
CHOLEST SERPL-MCNC: 201 MG/DL (ref 0–200)
CO2 SERPL-SCNC: 22 MMOL/L (ref 22–29)
CREAT SERPL-MCNC: 0.77 MG/DL (ref 0.57–1)
DEPRECATED RDW RBC AUTO: 46.8 FL (ref 37–54)
EGFRCR SERPLBLD CKD-EPI 2021: 86.3 ML/MIN/1.73
ERYTHROCYTE [DISTWIDTH] IN BLOOD BY AUTOMATED COUNT: 13.7 % (ref 12.3–15.4)
GLUCOSE SERPL-MCNC: 118 MG/DL (ref 65–99)
HBA1C MFR BLD: 5.6 % (ref 4.8–5.6)
HCT VFR BLD AUTO: 33.8 % (ref 34–46.6)
HDLC SERPL-MCNC: 50 MG/DL (ref 40–60)
HGB BLD-MCNC: 11.6 G/DL (ref 12–15.9)
LDLC SERPL CALC-MCNC: 124 MG/DL (ref 0–100)
LDLC/HDLC SERPL: 2.4 {RATIO}
LEFT ATRIUM VOLUME INDEX: 28.7 ML/M2
LV EF 2D ECHO EST: 63 %
MCH RBC QN AUTO: 32 PG (ref 26.6–33)
MCHC RBC AUTO-ENTMCNC: 34.3 G/DL (ref 31.5–35.7)
MCV RBC AUTO: 93.4 FL (ref 79–97)
PLATELET # BLD AUTO: 351 10*3/MM3 (ref 140–450)
PMV BLD AUTO: 9.2 FL (ref 6–12)
POTASSIUM SERPL-SCNC: 4.1 MMOL/L (ref 3.5–5.2)
QT INTERVAL: 488 MS
QTC INTERVAL: 449 MS
RBC # BLD AUTO: 3.62 10*6/MM3 (ref 3.77–5.28)
SODIUM SERPL-SCNC: 141 MMOL/L (ref 136–145)
TRIGL SERPL-MCNC: 154 MG/DL (ref 0–150)
TROPONIN T SERPL HS-MCNC: 1476 NG/L
VLDLC SERPL-MCNC: 27 MG/DL (ref 5–40)
WBC NRBC COR # BLD: 9.12 10*3/MM3 (ref 3.4–10.8)

## 2023-08-18 PROCEDURE — 80048 BASIC METABOLIC PNL TOTAL CA: CPT | Performed by: INTERNAL MEDICINE

## 2023-08-18 PROCEDURE — 84484 ASSAY OF TROPONIN QUANT: CPT | Performed by: INTERNAL MEDICINE

## 2023-08-18 PROCEDURE — 63710000001 PREDNISONE PER 5 MG: Performed by: NURSE PRACTITIONER

## 2023-08-18 PROCEDURE — 93306 TTE W/DOPPLER COMPLETE: CPT

## 2023-08-18 PROCEDURE — 25010000002 HEPARIN (PORCINE) PER 1000 UNITS: Performed by: NURSE PRACTITIONER

## 2023-08-18 PROCEDURE — 80061 LIPID PANEL: CPT | Performed by: INTERNAL MEDICINE

## 2023-08-18 PROCEDURE — 93005 ELECTROCARDIOGRAM TRACING: CPT | Performed by: INTERNAL MEDICINE

## 2023-08-18 PROCEDURE — 93306 TTE W/DOPPLER COMPLETE: CPT | Performed by: INTERNAL MEDICINE

## 2023-08-18 PROCEDURE — 83036 HEMOGLOBIN GLYCOSYLATED A1C: CPT | Performed by: INTERNAL MEDICINE

## 2023-08-18 PROCEDURE — 25010000002 HEPARIN (PORCINE) PER 1000 UNITS: Performed by: INTERNAL MEDICINE

## 2023-08-18 PROCEDURE — 93356 MYOCRD STRAIN IMG SPCKL TRCK: CPT | Performed by: INTERNAL MEDICINE

## 2023-08-18 PROCEDURE — 99233 SBSQ HOSP IP/OBS HIGH 50: CPT | Performed by: INTERNAL MEDICINE

## 2023-08-18 PROCEDURE — 93356 MYOCRD STRAIN IMG SPCKL TRCK: CPT

## 2023-08-18 PROCEDURE — 85027 COMPLETE CBC AUTOMATED: CPT | Performed by: INTERNAL MEDICINE

## 2023-08-18 PROCEDURE — 63710000001 PREDNISONE PER 1 MG: Performed by: NURSE PRACTITIONER

## 2023-08-18 RX ORDER — DIPHENHYDRAMINE HYDROCHLORIDE 50 MG/ML
50 INJECTION INTRAMUSCULAR; INTRAVENOUS
Status: DISCONTINUED | OUTPATIENT
Start: 2023-08-18 | End: 2023-08-18

## 2023-08-18 RX ORDER — SODIUM CHLORIDE 9 MG/ML
40 INJECTION, SOLUTION INTRAVENOUS AS NEEDED
Status: DISCONTINUED | OUTPATIENT
Start: 2023-08-18 | End: 2023-08-19 | Stop reason: HOSPADM

## 2023-08-18 RX ORDER — DIPHENHYDRAMINE HYDROCHLORIDE 50 MG/ML
50 INJECTION INTRAMUSCULAR; INTRAVENOUS
Status: DISCONTINUED | OUTPATIENT
Start: 2023-08-19 | End: 2023-08-18

## 2023-08-18 RX ORDER — METHYLPREDNISOLONE SODIUM SUCCINATE 40 MG/ML
40 INJECTION, POWDER, LYOPHILIZED, FOR SOLUTION INTRAMUSCULAR; INTRAVENOUS EVERY 4 HOURS
Status: DISCONTINUED | OUTPATIENT
Start: 2023-08-18 | End: 2023-08-18

## 2023-08-18 RX ORDER — SODIUM CHLORIDE 0.9 % (FLUSH) 0.9 %
10 SYRINGE (ML) INJECTION EVERY 12 HOURS SCHEDULED
Status: DISCONTINUED | OUTPATIENT
Start: 2023-08-18 | End: 2023-08-18

## 2023-08-18 RX ORDER — DIPHENHYDRAMINE HCL 50 MG
50 CAPSULE ORAL
Status: COMPLETED | OUTPATIENT
Start: 2023-08-19 | End: 2023-08-19

## 2023-08-18 RX ORDER — LIDOCAINE HYDROCHLORIDE 10 MG/ML
0.5 INJECTION, SOLUTION INFILTRATION; PERINEURAL ONCE AS NEEDED
Status: DISCONTINUED | OUTPATIENT
Start: 2023-08-18 | End: 2023-08-19 | Stop reason: HOSPADM

## 2023-08-18 RX ORDER — DIPHENHYDRAMINE HYDROCHLORIDE 50 MG/ML
50 INJECTION INTRAMUSCULAR; INTRAVENOUS
Status: COMPLETED | OUTPATIENT
Start: 2023-08-19 | End: 2023-08-19

## 2023-08-18 RX ORDER — SODIUM CHLORIDE 0.9 % (FLUSH) 0.9 %
10 SYRINGE (ML) INJECTION AS NEEDED
Status: DISCONTINUED | OUTPATIENT
Start: 2023-08-18 | End: 2023-08-19 | Stop reason: HOSPADM

## 2023-08-18 RX ORDER — METOPROLOL TARTRATE 5 MG/5ML
5 INJECTION INTRAVENOUS
Status: DISCONTINUED | OUTPATIENT
Start: 2023-08-18 | End: 2023-08-19 | Stop reason: HOSPADM

## 2023-08-18 RX ADMIN — LISINOPRIL 5 MG: 5 TABLET ORAL at 08:10

## 2023-08-18 RX ADMIN — ASPIRIN 81 MG: 81 TABLET, COATED ORAL at 08:10

## 2023-08-18 RX ADMIN — POLYETHYLENE GLYCOL 3350 17 G: 17 POWDER, FOR SOLUTION ORAL at 08:10

## 2023-08-18 RX ADMIN — TRAZODONE HYDROCHLORIDE 50 MG: 50 TABLET ORAL at 20:46

## 2023-08-18 RX ADMIN — PREDNISONE 50 MG: 20 TABLET ORAL at 20:46

## 2023-08-18 RX ADMIN — DOCUSATE SODIUM 50 MG AND SENNOSIDES 8.6 MG 2 TABLET: 8.6; 5 TABLET, FILM COATED ORAL at 08:09

## 2023-08-18 RX ADMIN — ROSUVASTATIN CALCIUM 40 MG: 10 TABLET, FILM COATED ORAL at 20:45

## 2023-08-18 RX ADMIN — METOPROLOL TARTRATE 25 MG: 25 TABLET, FILM COATED ORAL at 20:46

## 2023-08-18 RX ADMIN — HEPARIN SODIUM 5000 UNITS: 5000 INJECTION INTRAVENOUS; SUBCUTANEOUS at 20:47

## 2023-08-18 RX ADMIN — Medication 10 ML: at 20:47

## 2023-08-18 RX ADMIN — HEPARIN SODIUM 5000 UNITS: 5000 INJECTION INTRAVENOUS; SUBCUTANEOUS at 08:10

## 2023-08-18 RX ADMIN — TICAGRELOR 90 MG: 90 TABLET ORAL at 20:45

## 2023-08-18 RX ADMIN — METOPROLOL TARTRATE 25 MG: 25 TABLET, FILM COATED ORAL at 08:10

## 2023-08-18 RX ADMIN — PANTOPRAZOLE SODIUM 40 MG: 40 TABLET, DELAYED RELEASE ORAL at 06:12

## 2023-08-18 RX ADMIN — Medication 10 ML: at 08:09

## 2023-08-18 RX ADMIN — TICAGRELOR 90 MG: 90 TABLET ORAL at 08:09

## 2023-08-18 NOTE — PROGRESS NOTES
[facility] Medicine Services  INPATIENT PROGRESS NOTE    Length of Stay: 1  Date of Admission: 8/17/2023  Primary Care Physician: Spring Hollins, DNP, APRN    Subjective   Chief Complaint: Chest pain  HPI: Patient states that she is feeling better today.  No specific chest pain.    As of today, 08/18/23  Review of Systems   Constitutional:  Negative for appetite change, chills, fatigue, fever and unexpected weight change.   Respiratory:  Negative for cough, choking, chest tightness, shortness of breath and wheezing.    Cardiovascular:  Negative for chest pain, palpitations and leg swelling.   Gastrointestinal:  Negative for abdominal pain, blood in stool, constipation, diarrhea, nausea and vomiting.   Genitourinary:  Negative for dysuria, flank pain and hematuria.   Neurological:  Negative for dizziness, seizures, syncope, speech difficulty, weakness, light-headedness, numbness and headaches.   Hematological:  Does not bruise/bleed easily.      All pertinent negatives and positives are as above. All other systems have been reviewed and are negative unless otherwise stated.    Objective    Temp:  [97.7 øF (36.5 øC)-98.5 øF (36.9 øC)] 98 øF (36.7 øC)  Heart Rate:  [54-87] 64  Resp:  [16-18] 18  BP: ()/(50-74) 133/60         As of today, 08/18/23  Physical Exam  Vitals reviewed.   Constitutional:       Appearance: She is well-developed.   HENT:      Head: Normocephalic and atraumatic.   Eyes:      Pupils: Pupils are equal, round, and reactive to light.   Cardiovascular:      Rate and Rhythm: Normal rate and regular rhythm.      Heart sounds: Normal heart sounds. No murmur heard.    No friction rub. No gallop.   Pulmonary:      Effort: Pulmonary effort is normal. No respiratory distress.      Breath sounds: Normal breath sounds. No wheezing or rales.   Chest:      Chest wall: No tenderness.   Abdominal:      General: Bowel sounds are normal. There is no distension.       Palpations: Abdomen is soft.      Tenderness: There is no abdominal tenderness. There is no guarding.   Musculoskeletal:      Cervical back: Normal range of motion and neck supple.   Skin:     General: Skin is warm and dry.   Psychiatric:         Behavior: Behavior normal.         Thought Content: Thought content normal.         Results Review:  I have reviewed the labs, radiology results, and diagnostic studies.    Laboratory Data:   Results from last 7 days   Lab Units 08/18/23  0555 08/17/23  1538   SODIUM mmol/L 141 132*   POTASSIUM mmol/L 4.1 4.3   CHLORIDE mmol/L 106 100   CO2 mmol/L 22.0 19.0*   BUN mg/dL 16 11   CREATININE mg/dL 0.77 0.53*   GLUCOSE mg/dL 118* 106*   CALCIUM mg/dL 9.5 9.1   BILIRUBIN mg/dL  --  0.3   ALK PHOS U/L  --  81   ALT (SGPT) U/L  --  20   AST (SGOT) U/L  --  68*   ANION GAP mmol/L 13.0 13.0     Estimated Creatinine Clearance: 70.3 mL/min (by C-G formula based on SCr of 0.77 mg/dL).  Results from last 7 days   Lab Units 08/17/23  1538   MAGNESIUM mg/dL 2.4         Results from last 7 days   Lab Units 08/18/23  0555 08/17/23  1538   WBC 10*3/mm3 9.12 10.51   HEMOGLOBIN g/dL 11.6* 12.3   HEMATOCRIT % 33.8* 36.9   PLATELETS 10*3/mm3 351 317           Culture Data:   No results found for: BLOODCX  No results found for: URINECX  No results found for: RESPCX  No results found for: WOUNDCX  No results found for: STOOLCX  No components found for: BODYFLD    Radiology Data:   Imaging Results (Last 24 Hours)       ** No results found for the last 24 hours. **            I have utilized all available immediate resources to obtain, update, or review the patient's current medications (including all prescriptions, over-the-counter products, herbals, cannabis/cannabidiol products, and vitamin/mineral/dietary (nutritional) supplements).       Assessment/Plan     Active Hospital Problems    Diagnosis     **STEMI (ST elevation myocardial infarction)     ST elevation myocardial infarction  involving left circumflex coronary artery     CAD (coronary artery disease), native coronary artery        Plan:  1.  STEMI: Status post PTCA to the OM 2.  Doing well.  Continue current medical treatment.  2.  Coronary artery disease: Continue current treatment.  Plan CTA tomorrow to evaluate anomalous right coronary artery.  3.  Tobacco abuse: Smoking cessation advised.  4.  DVT prophylaxis: Heparin.    Medical Decision Making  Number and Complexity of problems: 1 highly complex medical problem    Conditions and Status:        Condition is unchanged.     MDM Data  External documents reviewed: Outpatient subspecialty notes  My EKG interpretation: Sinus bradycardia  My plain film interpretation: No acute cardiopulmonary disease       Discussed with: Patient     Treatment Plan  As above    Care Planning  Shared decision making: In agreement with plan of care  Code status and discussions: Full code    Disposition  Social Determinants of Health that impact treatment or disposition: Home  I expect the patient to be discharged to home in 1-2 days.       The patient was evaluated during the global COVID-19 pandemic, and the diagnosis was suspected/considered upon their initial presentation.  Evaluation, treatment, and testing were consistent with current guidelines for patients who present with complaints or symptoms that may be related to COVID-19.    I confirmed that the patient's Advance Care Plan is present, code status is documented, or surrogate decision maker is listed in the patient's medical record.        This document has been electronically signed by Royer Hernandez MD on August 18, 2023 18:41 CDT

## 2023-08-18 NOTE — PROGRESS NOTES
"[facility] Progress Note   LOS: 1 day   Patient Care Team:  Spring Hollins, CAT, APRN as PCP - General    Chief Complaint: \" denies any complaints today\"       Subjective     Interval History:   Patient Denies: no complaints today  Patient Complaints: no complaints today  History taken from: patient chart    No acute events overnight.  Vital signs stable.  Right radial cath site within normal limits.  H&H and kidney function stable.  Patient denies any complaints today.  Denies any further episodes of chest pain.    Objective     Vital Sign Min/Max for last 24 hours  Temp  Min: 97.7 øF (36.5 øC)  Max: 98.5 øF (36.9 øC)   BP  Min: 95/51  Max: 138/74   Pulse  Min: 55  Max: 93   Resp  Min: 16  Max: 18   SpO2  Min: 91 %  Max: 99 %   Flow (L/min)  Min: 2  Max: 2   Weight  Min: 59.5 kg (131 lb 2.8 oz)  Max: 65.5 kg (144 lb 6.4 oz)     Flowsheet Rows      Flowsheet Row First Filed Value   Admission Height 160 cm (63\") Documented at 08/17/2023 1530   Admission Weight 65.5 kg (144 lb 6.4 oz) Documented at 08/17/2023 1500              08/17/23  1500 08/17/23  1530 08/18/23  0600   Weight: 65.5 kg (144 lb 6.4 oz) 59.5 kg (131 lb 2.8 oz) 60.3 kg (133 lb)       Physical Exam:  Physical Exam  Vitals and nursing note reviewed.   Constitutional:       General: She is not in acute distress.     Appearance: Normal appearance. She is well-developed. She is not diaphoretic.   HENT:      Head: Normocephalic.      Right Ear: External ear normal.      Left Ear: External ear normal.   Eyes:      General: Lids are normal.      Pupils: Pupils are equal, round, and reactive to light.   Neck:      Thyroid: No thyromegaly.      Vascular: No carotid bruit or JVD.      Trachea: No tracheal deviation.   Cardiovascular:      Rate and Rhythm: Normal rate and regular rhythm.      Heart sounds: Normal heart sounds. No murmur heard.    No friction rub. No gallop.   Pulmonary:      Effort: Pulmonary effort is normal. No respiratory distress.      " Breath sounds: Normal breath sounds. No stridor. No wheezing or rales.   Chest:      Chest wall: No tenderness.   Abdominal:      General: Bowel sounds are normal. There is no distension.      Palpations: Abdomen is soft.      Tenderness: There is no abdominal tenderness.   Skin:     General: Skin is warm and dry.      Findings: No erythema or rash.      Comments: Right radial cath site within normal limits   Neurological:      Mental Status: She is alert and oriented to person, place, and time.      Cranial Nerves: No cranial nerve deficit.      Deep Tendon Reflexes: Reflexes are normal and symmetric. Reflexes normal.   Psychiatric:         Behavior: Behavior normal.         Thought Content: Thought content normal.         Judgment: Judgment normal.        Results Review:     Results from last 7 days   Lab Units 08/18/23  0555 08/17/23  1538   SODIUM mmol/L 141 132*   POTASSIUM mmol/L 4.1 4.3   CHLORIDE mmol/L 106 100   CO2 mmol/L 22.0 19.0*   BUN mg/dL 16 11   CREATININE mg/dL 0.77 0.53*   CALCIUM mg/dL 9.5 9.1   BILIRUBIN mg/dL  --  0.3   ALK PHOS U/L  --  81   ALT (SGPT) U/L  --  20   AST (SGOT) U/L  --  68*   GLUCOSE mg/dL 118* 106*       Estimated Creatinine Clearance: 70.3 mL/min (by C-G formula based on SCr of 0.77 mg/dL).    Results from last 7 days   Lab Units 08/17/23  1538   MAGNESIUM mg/dL 2.4             Results from last 7 days   Lab Units 08/18/23  0555 08/17/23  1538   WBC 10*3/mm3 9.12 10.51   HEMOGLOBIN g/dL 11.6* 12.3   PLATELETS 10*3/mm3 351 317           No results found for: PROBNP    I/O last 3 completed shifts:  In: 600 [P.O.:600]  Out: -     Cardiographics:  ECG/EMG Results (last 24 hours)       Procedure Component Value Units Date/Time    ECG 12 Lead Other; Post PCI [[id number]] Collected: 08/17/23 1555     Updated: 08/17/23 2216     QT Interval 362 ms      QTC Interval 438 ms     Narrative:      Test Reason : Other~  Blood Pressure :   */*   mmHG  Vent. Rate :  88 BPM     Atrial Rate :  88  BPM     P-R Int : 170 ms          QRS Dur :  84 ms      QT Int : 362 ms       P-R-T Axes :  71  65  70 degrees     QTc Int : 438 ms    Normal sinus rhythm  Biatrial enlargement  Nonspecific ST and T wave abnormality  Abnormal ECG  When compared with ECG of 17-MAY-2023 14:12,  Nonspecific T wave abnormality now evident in Lateral leads    Referred By:            Confirmed By:     Adult Transthoracic Echo Complete w/ Color, Spectral and Contrast if Necessary Per Protocol [[id number]] Resulted: 08/18/23 1038     Updated: 08/18/23 1040     EF(MOD-bp) 46.6 %      LV GLOBAL STRAIN  -16.0 %      LVIDd 4.7 cm      LVIDs 2.7 cm      IVSd 0.89 cm      LVPWd 1.11 cm      FS 43.5 %      IVS/LVPW 0.80 cm      ESV(cubed) 19.1 ml      LV Sys Vol (BSA corrected) 23.4 cm2      EDV(cubed) 105.8 ml      LV Buchanan Vol (BSA corrected) 40.5 cm2      LVOT area 3.7 cm2      LV mass(C)d 165.5 grams      LVOT diam 2.16 cm      EDV(MOD-sp2) 79.2 ml      EDV(MOD-sp4) 65.9 ml      ESV(MOD-sp2) 38.3 ml      ESV(MOD-sp4) 38.1 ml      SV(MOD-sp2) 40.9 ml      SV(MOD-sp4) 27.8 ml      SI(MOD-sp2) 25.2 ml/m2      SI(MOD-sp4) 17.1 ml/m2      EF(MOD-sp2) 51.6 %      EF(MOD-sp4) 42.2 %      MV E max chuck 82.7 cm/sec      MV A max chuck 82.7 cm/sec      MV dec time 0.20 msec      MV E/A 1.00     LA ESV Index (BP) 28.7 ml/m2      Med Peak E' Chuck 9.2 cm/sec      Lat Peak E' Chuck 11.4 cm/sec      Avg E/e' ratio 8.03     SV(LVOT) 74.0 ml      RVIDd 2.14 cm      TAPSE (>1.6) 2.22 cm      LA dimension (2D)  3.0 cm      LV V1 max 84.3 cm/sec      LV V1 max PG 2.8 mmHg      LV V1 mean PG 1.27 mmHg      LV V1 VTI 20.2 cm      Ao pk chuck 141.0 cm/sec      Ao max PG 8.0 mmHg      Ao mean PG 4.0 mmHg      Ao V2 VTI 32.2 cm      LEON(I,D) 2.30 cm2      MV max PG 4.5 mmHg      MV mean PG 1.27 mmHg      MV V2 VTI 30.0 cm      MV P1/2t 82.5 msec      MVA(P1/2t) 2.7 cm2      MVA(VTI) 2.47 cm2      MV dec slope 369.0 cm/sec2      MR max chuck 325.8 cm/sec      MR max PG 42.5 mmHg       TR max wili 254.6 cm/sec      TR max PG 25.9 mmHg      RVSP(TR) 28.9 mmHg      RAP systole 3.0 mmHg      PA V2 max 67.8 cm/sec      Ao root diam 3.5 cm      ACS 1.89 cm     Narrative:        Estimated right ventricular systolic pressure from tricuspid   regurgitation is normal (<35 mmHg).      Impression:                    Imaging Results (Most Recent)       Procedure Component Value Units Date/Time    XR Chest 1 View [[id number]] Collected: 08/17/23 1606     Updated: 08/17/23 1609    Narrative:      XR CHEST 1 VIEW    HISTORY: Post STEMI    COMPARISON: 2 views chest 5/17/2023.    FINDINGS:    The cardiomediastinal silhouette is within normal limits.    No focal consolidation.    The osseous structures are age-appropriate.      Impression:      1.  No acute intrathoracic process.                  XR Chest 1 View    Result Date: 8/17/2023  1.  No acute intrathoracic process.      Medication Review:     Current Facility-Administered Medications:     acetaminophen (TYLENOL) tablet 650 mg, 650 mg, Oral, Q4H PRN, Haroldo Zimmerman MD    aspirin EC tablet 81 mg, 81 mg, Oral, Daily, Haroldo Zimmerman MD, 81 mg at 08/18/23 0810    sennosides-docusate (PERICOLACE) 8.6-50 MG per tablet 2 tablet, 2 tablet, Oral, BID, 2 tablet at 08/18/23 0809 **AND** polyethylene glycol (MIRALAX) packet 17 g, 17 g, Oral, Daily PRN, 17 g at 08/18/23 0810 **AND** bisacodyl (DULCOLAX) EC tablet 5 mg, 5 mg, Oral, Daily PRN **AND** bisacodyl (DULCOLAX) suppository 10 mg, 10 mg, Rectal, Daily PRN, Haroldo Zimmerman MD    [START ON 8/19/2023] diphenhydrAMINE (BENADRYL) injection 50 mg, 50 mg, Intravenous, Once When Specified, Farzana Alegre APRN    heparin (porcine) 5000 UNIT/ML injection 5,000 Units, 5,000 Units, Subcutaneous, Q12H, Haroldo Zimmerman MD, 5,000 Units at 08/18/23 0810    lidocaine (XYLOCAINE) 1 % injection 0.5 mL, 0.5 mL, Intradermal, Once PRN, Farzana Alegre APRN    lisinopril (PRINIVIL,ZESTRIL) tablet 5 mg,  5 mg, Oral, Daily, Haroldo Zimmerman MD, 5 mg at 08/18/23 0810    methylPREDNISolone sodium succinate (SOLU-Medrol) injection 40 mg, 40 mg, Intravenous, Q4H, Farzana Alegre, APRYOUNG    metoprolol tartrate (LOPRESSOR) injection 5 mg, 5 mg, Intravenous, Q5 Min PRN, Farzana Alegre, APRN    metoprolol tartrate (LOPRESSOR) tablet 25 mg, 25 mg, Oral, Q12H, Haroldo Zimmerman MD, 25 mg at 08/18/23 0810    nitroglycerin (NITROSTAT) SL tablet 0.4 mg, 0.4 mg, Sublingual, Q5 Min PRN, Haroldo Zimmerman MD    pantoprazole (PROTONIX) EC tablet 40 mg, 40 mg, Oral, Q AM, Haroldo Zimmerman MD, 40 mg at 08/18/23 0612    rosuvastatin (CRESTOR) tablet 40 mg, 40 mg, Oral, Nightly, Haroldo Zimmerman MD, 40 mg at 08/17/23 2014    sodium chloride 0.9 % flush 10 mL, 10 mL, Intravenous, Q12H, Haroldo Zimmerman MD, 10 mL at 08/18/23 0809    sodium chloride 0.9 % flush 10 mL, 10 mL, Intravenous, PRN, Farzana Alegre, APRN    sodium chloride 0.9 % infusion 40 mL, 40 mL, Intravenous, PRN, Haroldo Zimmerman MD    sodium chloride 0.9 % infusion 40 mL, 40 mL, Intravenous, PRN, Farzana Alegre, APRN    ticagrelor (BRILINTA) tablet 90 mg, 90 mg, Oral, BID, Haroldo Zimmerman MD, 90 mg at 08/18/23 0809    traZODone (DESYREL) tablet 50 mg, 50 mg, Oral, Nightly, Haroldo Zimmerman MD, 50 mg at 08/17/23 2014    Assessment & Plan       STEMI (ST elevation myocardial infarction)    ST elevation myocardial infarction involving left circumflex coronary artery    CAD (coronary artery disease), native coronary artery    #1.  Inferior STEMI/CAD: This is a 62-year-old -American female with medical history of hypercholesterolemia, GERD, hypertension, vitamin D deficiency, arthritis who presented to the hospital with chest pain which started 4 days prior.  She presented via EMS.  Found to have ST elevations in inferior and lateral leads suggestive of ischemia.  Patient was administered contrast allergy prior to procedure  due to contrast allergy.  Emergent coronary angiography was performed via right radial artery approach per Dr. Zimmerman.  She was found to have culprit lesion of OM branch of the left circumflex for which she received revascularization with 1 drug-eluting stent. [facility] impression:     Conclusion :  Inferior STEMI.  Successful PCI to culprit occlusion of OM2 branch of LCx with restoration of IONA-3 flow to the vessel and reduction in angiographic stenosis to 0%.  Severe CAD involving OM1 branch of LCx.  Unusual takeoff of nondominant RCA as detailed above.  Severe stenosis is noted in the ostial and proximal segments of this vessel.  Moderate CAD involving mid LAD.  Normal LV systolic function on left ventriculography.     Plan:   Continue dual antiplatelet therapy with aspirin and Brilinta.  Loading doses of these medications were administered today.  Optimize medical therapy for CAD and CAD risk factors.  Routine post cath care instructions (no driving for 48 hrs, no lifting >10 lbs from right arm for 5 days) were discussed with the patient and family.  TR band release per protocol.  IV fluid hydration postprocedure.  Routine post STEMI care in the CCU.     Patient has progressed well with no further episodes of chest pain overnight.  Right radial artery cath site within normal limits.  H&H and kidney function stable.      -Due to unusual takeoff of a nondominant RCA/anomalous RCA she will be recommended for coronary CTA to further assess this.  Contrast allergy premedication will be given.  -Echocardiogram pending today to assess LVEF, regional wall motion abnormalities.  -Continue dual antiplatelet therapy with aspirin and Brilinta  -Continue lipid-lowering therapy with Crestor 40 mg  -Continue metoprolol tartrate 25 mg twice daily  -Continue lisinopril 5 mg daily  -Continue PPI with Protonix 40 mg    A1c normal. Lipid panel showing LDL elevated at 124, HDL 50, trig 154    Smoking cessation advised.  An aggressive risk  factor modification.    Jo Ann Lacy  reports that she has been smoking cigarettes. She started smoking about 48 years ago. She has a 22.50 pack-year smoking history. She has been exposed to tobacco smoke. She has never used smokeless tobacco.. I have educated her on the risk of diseases from using tobacco products such as cancer, COPD, and heart disease.     I advised her to quit and she is thinking about quitting smoking.     I spent 3  minutes counseling the patient.         Plan for disposition:Transfer to [facility]. Dr. Bedoya to provide weekend coverage. Anticipate discharge tomorrow.             This document has been electronically signed by RODNEY Garcia on August 18, 2023 12:37 CDT    Electronically signed by RODNEY Garcia, 08/18/23, 12:37 PM CDT.    I personally saw and examined Jo Ann Lacy after the APRN.  I personally performed a history and physical examination of the patient.  I personally reviewed independent findings and plan of care.  I discussed management with the APRN.  I agree with the APRN's documentation.    No acute overnight events.  The patient denies having any further episodes of chest pressure.  Telemetry was reviewed.  No hemodynamically significant ventricular or supraventricular arrhythmias were noted.  Right radial cath access site was examined and does not show any evidence of complications.    Vitals:    08/18/23 1200 08/18/23 1300 08/18/23 1400 08/18/23 1500   BP: 126/59 125/74 116/63 121/69   BP Location: Left arm      Patient Position: Lying      Pulse: 63 57 54 56   Resp: 18      Temp: 98 øF (36.7 øC)      TempSrc: Temporal      SpO2: 99% 96% 96% 97%   Weight:       Height:          Inferior STEMI:  She is s/p successful PCI to culprit occlusion of OM2 branch of LCx.  Continue dual antiplatelet therapy with baby aspirin and Brilinta.  Continue high intensity statin therapy with Crestor.  Continue Lopressor and lisinopril therapy.  LVEF is estimated at 61  to 65% on transthoracic echocardiography.    Severe coronary artery disease:  She has severe residual CAD involving OM1 branch of LCx and moderate CAD involving the LAD system.  Her chest discomfort has resolved at this point.  We will reevaluate her in the cardiology clinic in a couple of weeks after discharge to determine further management for this disease.    Unusual origin of the RCA:  Her RCA appeared to be nondominant on angiography.  It appeared to have an unusual high anterior takeoff (?left coronary cusp).  Severe stenosis involving ostial and proximal segments of this vessel was noted on angiography.  Will obtain a coronary CTA to further define RCA origin and evaluate for interarterial course.  This is scheduled for tomorrow morning.    DVT PPx: SC heparin  GI PPx: Diet and oral Protonix    Dr. Bedoya is covering cardiology service over the weekend.  We will transfer primary service to the hospitalist team today.    Electronically signed by Haroldo Zimmerman MD, 08/18/23, 4:58 PM CDT.

## 2023-08-18 NOTE — PAYOR COMM NOTE
"  [facility]  Case Managment Extender   Susan Amanda  (P) [phone]  (F) [phone]      REF# [id number]   Jo Ann Tavares (62 y.o. Female)       Date of Birth   1960    Social Security Number       Address   [address] [place] [address]    Home Phone   [phone]    MRN   [id number]       [employer]   [employer]    Marital Status   Single                            Admission Date   8/17/23    Admission Type   Emergency    Admitting Provider   Haroldo Zimmerman MD    Attending Provider   Haroldo Zimmerman MD    Department, Room/Bed   [facility] CRITICAL CARE STEPDOWN, 09/A       Discharge Date       Discharge Disposition       Discharge Destination                                 Attending Provider: Haroldo Zimmerman MD    Allergies: Contrast Dye (Echo Or Unknown Ct/mr)    Isolation: None   Infection: None   Code Status: CPR    Ht: 160 cm (63\")   Wt: 60.3 kg (133 lb)    Admission Cmt: None   Principal Problem: STEMI (ST elevation myocardial infarction) [I21.3]                   Active Insurance as of 8/17/2023       Primary Coverage       Payor Plan Insurance Group Employer/Plan Group    ANTHEM MEDICAID ANTHEM MEDICAID [id number]       Payor Plan Address Payor Plan Phone Number Payor Plan Fax Number Effective Dates    [address] [address] [phone]  10/14/2022 - None Entered    [facility] [id number]         Subscriber Name Subscriber Birth Date Member ID       JO ANN TAVARES 1960 [id number]                     Emergency Contacts        (Rel.) Home Phone Work Phone Mobile Phone    Shahid Tavares (Son) [phone] -- [phone]    ABEL HENDERSON (Friend) [phone] -- [phone]                 History & Physical        Haroldo Zimmerman MD at 08/17/23 1426            [facility] Cardiology  HISTORY AND PHYSICAL  Jo Ann Tavares  62 y.o. female    Chief " complaint -chest pressure      History of Present Illness:    The patient is a 62-year-old female who presented to [facility] ER earlier today as a STEMI activation from the EMS.  The patient reports having central chest pressure that started 4 days ago.  She noted significant worsening of her symptoms earlier today and decided to seek medical attention.  ECG done by the EMS showed ST elevations in inferior and lateral leads suggestive of STEMI.  The patient was administered contrast allergy premedication prior to the procedure due to reported dye allergy.  Emergent coronary angiography was performed via right radial artery approach upon arrival and demonstrated culprit occlusion of the OM branch of LCx.  This was successfully revascularized with placement of one drug-eluting stent with restoration of IONA-3 flow to the vessel and reduction in stenosis to 0%.  She was loaded with Brilinta in the Cath Lab.  She received loading dose of aspirin prior to Cath Lab arrival.  She is being admitted to the CCU for further management.            Allergies   Allergen Reactions    Contrast Dye (Echo Or Unknown Ct/Mr) Swelling         Past Medical History:   Diagnosis Date    Arthritis     Benign polyp of large intestine     Elevated cholesterol     Essential hypertension     Gastroesophageal reflux disease     History of transfusion     Hypertension     Postmenopausal state     Tubular adenoma of colon     Villous adenoma of colon     1.6cm VTA with intermediate grade dysplasia transverse.  1.0cm VTA with intermediate grade dysplasia ascending    Vitamin D deficiency          Past Surgical History:   Procedure Laterality Date    COLON RESECTION N/A 6/1/2023    Procedure: LAPAROSCOPIC ASSISTED RIGHT COLON RESECTION;  Surgeon: Last Miranda MD;  Location: [facility];  Service: General;  Laterality: N/A;    COLONOSCOPY  05/16/2016    Many 4-5 mm polyps in the transverse colon and in the ascending colon.Resected and  retrieved.External and internal hemorrhoids    COLONOSCOPY N/A 03/10/2023    Procedure: COLONOSCOPY;  Surgeon: Last Miranda MD;  Location: [facility] ENDOSCOPY;  Service: Gastroenterology;  Laterality: N/A;    ECTOPIC PREGNANCY SURGERY      KNEE SURGERY Left     TOTAL ABDOMINAL HYSTERECTOMY WITH SALPINGO OOPHORECTOMY  2013         Family History   Problem Relation Age of Onset    Stroke Mother     Cataracts Mother     Hypertension Mother     Heart disease Father     Diabetes Father     Obesity Father     Glaucoma Father     Hypertension Father          Social History     Socioeconomic History    Marital status: Single   Tobacco Use    Smoking status: Every Day     Packs/day: 0.50     Years: 45.00     Pack years: 22.50     Types: Cigarettes     Start date: 1975     Passive exposure: Current    Smokeless tobacco: Never   Vaping Use    Vaping Use: Never used   Substance and Sexual Activity    Alcohol use: No    Drug use: No    Sexual activity: Defer     Birth control/protection: Surgical         Prior to Admission medications    Medication Sig Start Date End Date Taking? Authorizing Provider   amLODIPine (NORVASC) 5 MG tablet Take 1 tablet by mouth Daily. 6/16/23   Spring Hollins DNP, APRN   omeprazole (priLOSEC) 20 MG capsule Take 1 capsule by mouth Daily. 6/16/23   Spring Hollins DNP, APRN   rosuvastatin (Crestor) 20 MG tablet Take 1 tablet by mouth Every Night. 6/16/23   Spring Hollins DNP APRN   traMADol (ULTRAM) 50 MG tablet Take 1 tablet by mouth Every 6 (Six) Hours As Needed for Moderate Pain. 7/3/23   Marcelo Wang MD   traZODone (DESYREL) 50 MG tablet Take 1 tablet by mouth Every Night. 6/16/23   Spring Hollins DNP, APRN   vitamin D (ERGOCALCIFEROL) 1.25 MG (01517 UT) capsule capsule Take 1 capsule by mouth 1 (One) Time Per Week. 6/16/23   Spring Hollins DNP, APRN     Review of Systems   Constitutional:  Negative for chills and fever.   HENT:  Negative for ear discharge, ear pain and hearing loss.    Eyes:   Negative for discharge and itching.   Respiratory:  Positive for shortness of breath. Negative for cough.    Cardiovascular:  Positive for chest pain. Negative for palpitations and leg swelling.   Gastrointestinal:  Negative for abdominal pain and diarrhea.   Endocrine: Negative for cold intolerance and heat intolerance.   Genitourinary:  Negative for flank pain and hematuria.   Musculoskeletal:  Negative for joint swelling and neck pain.   Skin:  Negative for pallor and bruise.   Allergic/Immunologic: Negative for environmental allergies and food allergies.   Neurological:  Negative for speech difficulty and headache.   Hematological:  Does not bruise/bleed easily.   Psychiatric/Behavioral:  Negative for hallucinations and depressed mood.           OBJECTIVE:    There were no vitals filed for this visit.     LMP 07/10/2017     Physical Exam  Constitutional:       General: She is in acute distress.   HENT:      Head: Normocephalic.      Right Ear: External ear normal.      Left Ear: External ear normal.      Nose: No congestion or rhinorrhea.      Mouth/Throat:      Mouth: Mucous membranes are moist.      Pharynx: No posterior oropharyngeal erythema.   Eyes:      General:         Right eye: No discharge.         Left eye: No discharge.   Cardiovascular:      Rate and Rhythm: Normal rate and regular rhythm.   Pulmonary:      Effort: No respiratory distress.      Breath sounds: No wheezing.   Abdominal:      General: There is no distension.      Palpations: Abdomen is soft.   Musculoskeletal:      Cervical back: Neck supple. No rigidity.      Right lower leg: No edema.      Left lower leg: No edema.   Skin:     General: Skin is warm.      Coloration: Skin is not jaundiced.   Neurological:      Mental Status: She is alert. Mental status is at baseline.   Psychiatric:         Mood and Affect: Mood normal.         Thought Content: Thought content normal.         Judgment: Judgment normal.          Lab Results (last 24  hours)       ** No results found for the last 24 hours. **                The ASCVD Risk score (Sourav DK, et al., 2019) failed to calculate for the following reasons:    The patient has a prior MI or stroke diagnosis        Assessment:    -Inferior STEMI, s/p successful revascularization of culprit occlusion of OM2 branch of LCx with MIN  -Severe CAD involving OM1 branch of Lcx  -Anomalous origin of the RCA   -Tobacco use    Recommendations:  -Admit to the CCU for further management  -Continue dual antiplatelet therapy with aspirin and Brilinta  -Initiate high intensity statin therapy with Crestor  -Initiate beta-blocker therapy  -Initiate ACE inhibitor therapy  -Further risk stratification with A1c and lipid profile  -Continue to trend serum troponins until downtrending is noted  -Telemetry monitoring  -Cardiac rehab consultation  -Obtain a transthoracic echocardiogram to evaluate LVEF and look for other underlying structural heart disease  -Consider obtaining a coronary CTA to further evaluate the origin and course of the RCA  -Cessation of tobacco use was discussed with the patient    DVT PPx: SC heparin  GI PPx: Oral Protonix and diet    CODE STATUS: Full    BMI is within normal parameters. No other follow-up for BMI required.      Jo Ann Lacy  reports that she has been smoking cigarettes. She started smoking about 48 years ago. She has a 22.50 pack-year smoking history. She has been exposed to tobacco smoke. She has never used smokeless tobacco.. I have educated her on the risk of diseases from using tobacco products such as cancer, COPD, and heart disease.     I advised her to quit and she will think about it    I spent 3  minutes counseling the patient.       Haroldo Zimmerman MD  8/17/2023  14:26 CDT      Part of this note may be an electronic transcription/translation of spoken language to printed text using the Dragon Dictation System.         Electronically signed by Haroldo Zimmerman MD at  08/17/23 1442       Emergency Department Notes    No notes of this type exist for this encounter.       Lab Results (last 24 hours)       Procedure Component Value Units Date/Time    Basic Metabolic Panel [[id number]]  (Abnormal) Collected: 08/18/23 0555    Specimen: Blood Updated: 08/18/23 0712     Glucose 118 mg/dL      BUN 16 mg/dL      Creatinine 0.77 mg/dL      Sodium 141 mmol/L      Potassium 4.1 mmol/L      Chloride 106 mmol/L      CO2 22.0 mmol/L      Calcium 9.5 mg/dL      BUN/Creatinine Ratio 20.8     Anion Gap 13.0 mmol/L      eGFR 86.3 mL/min/1.73     Narrative:      GFR Normal >60  Chronic Kidney Disease <60  Kidney Failure <15      Lipid Panel [[id number]]  (Abnormal) Collected: 08/18/23 0555    Specimen: Blood Updated: 08/18/23 0712     Total Cholesterol 201 mg/dL      Triglycerides 154 mg/dL      HDL Cholesterol 50 mg/dL      LDL Cholesterol  124 mg/dL      VLDL Cholesterol 27 mg/dL      LDL/HDL Ratio 2.40    Narrative:      Cholesterol Reference Ranges  (U.S. Department of Health and Human Services ATP III Classifications)    Desirable          <200 mg/dL  Borderline High    200-239 mg/dL  High Risk          >240 mg/dL      Triglyceride Reference Ranges  (U.S. Department of Health and Human Services ATP III Classifications)    Normal           <150 mg/dL  Borderline High  150-199 mg/dL  High             200-499 mg/dL  Very High        >500 mg/dL    HDL Reference Ranges  (U.S. Department of Health and Human Services ATP III Classifications)    Low     <40 mg/dl (major risk factor for CHD)  High    >60 mg/dl ('negative' risk factor for CHD)        LDL Reference Ranges  (U.S. Department of Health and Human Services ATP III Classifications)    Optimal          <100 mg/dL  Near Optimal     100-129 mg/dL  Borderline High  130-159 mg/dL  High             160-189 mg/dL  Very High        >189 mg/dL    High Sensitivity Troponin T [[id number]]  (Abnormal) Collected: 08/18/23 0555    Specimen: Blood Updated:  08/18/23 0654     HS Troponin T 1,476 ng/L     Narrative:      High Sensitive Troponin T Reference Range:  <10.0 ng/L- Negative Female for AMI  <15.0 ng/L- Negative Male for AMI  >=10 - Abnormal Female indicating possible myocardial injury.  >=15 - Abnormal Male indicating possible myocardial injury.   Clinicians would have to utilize clinical acumen, EKG, Troponin, and serial changes to determine if it is an Acute Myocardial Infarction or myocardial injury due to an underlying chronic condition.         Hemoglobin A1c [[id number]]  (Normal) Collected: 08/18/23 0555    Specimen: Blood Updated: 08/18/23 0648     Hemoglobin A1C 5.60 %     Narrative:      Hemoglobin A1C Ranges:    Increased Risk for Diabetes  5.7% to 6.4%  Diabetes                     >= 6.5%  Diabetic Goal                < 7.0%    CBC (No Diff) [[id number]]  (Abnormal) Collected: 08/18/23 0555    Specimen: Blood Updated: 08/18/23 0627     WBC 9.12 10*3/mm3      RBC 3.62 10*6/mm3      Hemoglobin 11.6 g/dL      Hematocrit 33.8 %      MCV 93.4 fL      MCH 32.0 pg      MCHC 34.3 g/dL      RDW 13.7 %      RDW-SD 46.8 fl      MPV 9.2 fL      Platelets 351 10*3/mm3     High Sensitivity Troponin T 2Hr [[id number]]  (Abnormal) Collected: 08/17/23 1739    Specimen: Blood Updated: 08/17/23 1808     HS Troponin T 1,811 ng/L      Troponin T Delta -17 ng/L     Narrative:      High Sensitive Troponin T Reference Range:  <10.0 ng/L- Negative Female for AMI  <15.0 ng/L- Negative Male for AMI  >=10 - Abnormal Female indicating possible myocardial injury.  >=15 - Abnormal Male indicating possible myocardial injury.   Clinicians would have to utilize clinical acumen, EKG, Troponin, and serial changes to determine if it is an Acute Myocardial Infarction or myocardial injury due to an underlying chronic condition.         High Sensitivity Troponin T [[id number]]  (Abnormal) Collected: 08/17/23 1538    Specimen: Blood Updated: 08/17/23 1609     HS Troponin T 1,828 ng/L      Narrative:      High Sensitive Troponin T Reference Range:  <10.0 ng/L- Negative Female for AMI  <15.0 ng/L- Negative Male for AMI  >=10 - Abnormal Female indicating possible myocardial injury.  >=15 - Abnormal Male indicating possible myocardial injury.   Clinicians would have to utilize clinical acumen, EKG, Troponin, and serial changes to determine if it is an Acute Myocardial Infarction or myocardial injury due to an underlying chronic condition.         Magnesium [[id number]]  (Normal) Collected: 08/17/23 1538    Specimen: Blood Updated: 08/17/23 1607     Magnesium 2.4 mg/dL     Comprehensive Metabolic Panel [[id number]]  (Abnormal) Collected: 08/17/23 1538    Specimen: Blood Updated: 08/17/23 1607     Glucose 106 mg/dL      BUN 11 mg/dL      Creatinine 0.53 mg/dL      Sodium 132 mmol/L      Potassium 4.3 mmol/L      Chloride 100 mmol/L      CO2 19.0 mmol/L      Calcium 9.1 mg/dL      Total Protein 7.7 g/dL      Albumin 3.9 g/dL      ALT (SGPT) 20 U/L      AST (SGOT) 68 U/L      Alkaline Phosphatase 81 U/L      Total Bilirubin 0.3 mg/dL      Globulin 3.8 gm/dL      A/G Ratio 1.0 g/dL      BUN/Creatinine Ratio 20.8     Anion Gap 13.0 mmol/L      eGFR 103.4 mL/min/1.73     Narrative:      GFR Normal >60  Chronic Kidney Disease <60  Kidney Failure <15      CBC & Differential [[id number]]  (Abnormal) Collected: 08/17/23 1538    Specimen: Blood Updated: 08/17/23 1546    Narrative:      The following orders were created for panel order CBC & Differential.  Procedure                               Abnormality         Status                     ---------                               -----------         ------                     CBC Auto Differential[[id number]]        Abnormal            Final result                 Please view results for these tests on the individual orders.    CBC Auto Differential [[id number]]  (Abnormal) Collected: 08/17/23 1538    Specimen: Blood Updated: 08/17/23 1546     WBC 10.51 10*3/mm3      RBC  3.86 10*6/mm3      Hemoglobin 12.3 g/dL      Hematocrit 36.9 %      MCV 95.6 fL      MCH 31.9 pg      MCHC 33.3 g/dL      RDW 13.5 %      RDW-SD 47.5 fl      MPV 9.1 fL      Platelets 317 10*3/mm3      Neutrophil % 88.9 %      Lymphocyte % 8.9 %      Monocyte % 1.4 %      Eosinophil % 0.0 %      Basophil % 0.3 %      Immature Grans % 0.5 %      Neutrophils, Absolute 9.34 10*3/mm3      Lymphocytes, Absolute 0.94 10*3/mm3      Monocytes, Absolute 0.15 10*3/mm3      Eosinophils, Absolute 0.00 10*3/mm3      Basophils, Absolute 0.03 10*3/mm3      Immature Grans, Absolute 0.05 10*3/mm3      nRBC 0.0 /100 WBC           Imaging Results (Last 24 Hours)       Procedure Component Value Units Date/Time    XR Chest 1 View [[id number]] Collected: 08/17/23 1606     Updated: 08/17/23 1609    Narrative:      XR CHEST 1 VIEW    HISTORY: Post STEMI    COMPARISON: 2 views chest 5/17/2023.    FINDINGS:    The cardiomediastinal silhouette is within normal limits.    No focal consolidation.    The osseous structures are age-appropriate.      Impression:      1.  No acute intrathoracic process.                ECG/EMG Results (last 24 hours)       Procedure Component Value Units Date/Time    ECG 12 Lead Other; Post PCI [[id number]] Collected: 08/17/23 1555     Updated: 08/17/23 2216     QT Interval 362 ms      QTC Interval 438 ms     Narrative:      Test Reason : Other~  Blood Pressure :   */*   mmHG  Vent. Rate :  88 BPM     Atrial Rate :  88 BPM     P-R Int : 170 ms          QRS Dur :  84 ms      QT Int : 362 ms       P-R-T Axes :  71  65  70 degrees     QTc Int : 438 ms    Normal sinus rhythm  Biatrial enlargement  Nonspecific ST and T wave abnormality  Abnormal ECG  When compared with ECG of 17-MAY-2023 14:12,  Nonspecific T wave abnormality now evident in Lateral leads    Referred By:            Confirmed By:           Physician Progress Notes (last 24 hours)  Notes from 08/17/23 0917 through 08/18/23 0917   No notes of this type exist  for this encounter.       Medical Student Notes (last 24 hours)  Notes from 08/17/23 0917 through 08/18/23 0917   No notes of this type exist for this encounter.       Consult Notes (last 24 hours)  Notes from 08/17/23 0917 through 08/18/23 0917   No notes of this type exist for this encounter.

## 2023-08-18 NOTE — PLAN OF CARE
Problem: Adult Inpatient Plan of Care  Goal: Plan of Care Review  Outcome: Ongoing, Progressing  Flowsheets (Taken 8/18/2023 1020)  Plan of Care Reviewed With: patient   Goal Outcome Evaluation:  Plan of Care Reviewed With: patient                   admit w/ CP and STEMI- s/p TR band and stent. Cardiac diet, no intakes available. # w. BMI 23.5. Pt states nkfa, no c/s problems. Good appetite PTA. No weight concerns. Discussed cardiac HH diet w/ pt r/t elev triglycerides and cholesterol levels. Will attach diet info to print w/ d/c papers. RD following.

## 2023-08-19 ENCOUNTER — APPOINTMENT (OUTPATIENT)
Dept: CT IMAGING | Facility: HOSPITAL | Age: 63
DRG: 247 | End: 2023-08-19
Payer: MEDICAID

## 2023-08-19 ENCOUNTER — READMISSION MANAGEMENT (OUTPATIENT)
Dept: CALL CENTER | Facility: HOSPITAL | Age: 63
End: 2023-08-19
Payer: MEDICAID

## 2023-08-19 VITALS
SYSTOLIC BLOOD PRESSURE: 150 MMHG | HEART RATE: 60 BPM | OXYGEN SATURATION: 96 % | BODY MASS INDEX: 24.24 KG/M2 | HEIGHT: 63 IN | WEIGHT: 136.8 LBS | TEMPERATURE: 98.2 F | DIASTOLIC BLOOD PRESSURE: 76 MMHG | RESPIRATION RATE: 18 BRPM

## 2023-08-19 PROCEDURE — 75574 CT ANGIO HRT W/3D IMAGE: CPT

## 2023-08-19 PROCEDURE — 25010000002 HEPARIN (PORCINE) PER 1000 UNITS: Performed by: NURSE PRACTITIONER

## 2023-08-19 PROCEDURE — 63710000001 DIPHENHYDRAMINE PER 50 MG: Performed by: NURSE PRACTITIONER

## 2023-08-19 PROCEDURE — 25510000001 IOPAMIDOL PER 1 ML: Performed by: HOSPITALIST

## 2023-08-19 PROCEDURE — 63710000001 PREDNISONE PER 5 MG: Performed by: NURSE PRACTITIONER

## 2023-08-19 PROCEDURE — 63710000001 PREDNISONE PER 1 MG: Performed by: NURSE PRACTITIONER

## 2023-08-19 RX ORDER — NITROGLYCERIN 0.4 MG/1
TABLET SUBLINGUAL
Status: COMPLETED
Start: 2023-08-19 | End: 2023-08-19

## 2023-08-19 RX ORDER — ASPIRIN 81 MG/1
81 TABLET ORAL DAILY
Qty: 30 TABLET | Refills: 0 | Status: SHIPPED | OUTPATIENT
Start: 2023-08-20

## 2023-08-19 RX ORDER — NITROGLYCERIN 0.4 MG/1
0.4 TABLET SUBLINGUAL
Qty: 30 TABLET | Refills: 12 | Status: SHIPPED | OUTPATIENT
Start: 2023-08-19

## 2023-08-19 RX ORDER — NICOTINE 21 MG/24HR
1 PATCH, TRANSDERMAL 24 HOURS TRANSDERMAL EVERY 24 HOURS
Qty: 28 PATCH | Refills: 0 | Status: SHIPPED | OUTPATIENT
Start: 2023-08-19

## 2023-08-19 RX ORDER — LISINOPRIL 5 MG/1
5 TABLET ORAL DAILY
Qty: 30 TABLET | Refills: 0 | Status: SHIPPED | OUTPATIENT
Start: 2023-08-20

## 2023-08-19 RX ORDER — ROSUVASTATIN CALCIUM 40 MG/1
40 TABLET, COATED ORAL NIGHTLY
Qty: 90 TABLET | Refills: 0 | Status: SHIPPED | OUTPATIENT
Start: 2023-08-19

## 2023-08-19 RX ADMIN — ASPIRIN 81 MG: 81 TABLET, COATED ORAL at 08:02

## 2023-08-19 RX ADMIN — TICAGRELOR 90 MG: 90 TABLET ORAL at 08:02

## 2023-08-19 RX ADMIN — NITROGLYCERIN 0.4 MG: 0.4 TABLET, ORALLY DISINTEGRATING SUBLINGUAL at 10:09

## 2023-08-19 RX ADMIN — Medication 10 ML: at 10:08

## 2023-08-19 RX ADMIN — NITROGLYCERIN 0.4 MG: 0.4 TABLET SUBLINGUAL at 10:09

## 2023-08-19 RX ADMIN — IOPAMIDOL 90 ML: 755 INJECTION, SOLUTION INTRAVENOUS at 10:42

## 2023-08-19 RX ADMIN — METOPROLOL TARTRATE 25 MG: 25 TABLET, FILM COATED ORAL at 08:02

## 2023-08-19 RX ADMIN — PREDNISONE 50 MG: 20 TABLET ORAL at 08:01

## 2023-08-19 RX ADMIN — HEPARIN SODIUM 5000 UNITS: 5000 INJECTION INTRAVENOUS; SUBCUTANEOUS at 08:03

## 2023-08-19 RX ADMIN — PREDNISONE 50 MG: 20 TABLET ORAL at 02:05

## 2023-08-19 RX ADMIN — LISINOPRIL 5 MG: 5 TABLET ORAL at 08:02

## 2023-08-19 RX ADMIN — DIPHENHYDRAMINE HYDROCHLORIDE 50 MG: 50 CAPSULE ORAL at 08:02

## 2023-08-19 NOTE — PLAN OF CARE
Problem: Adult Inpatient Plan of Care  Goal: Plan of Care Review  Outcome: Ongoing, Progressing  Flowsheets (Taken 8/19/2023 0430)  Progress: improving  Plan of Care Reviewed With: patient  Goal: Patient-Specific Goal (Individualized)  Outcome: Ongoing, Progressing  Goal: Absence of Hospital-Acquired Illness or Injury  Outcome: Ongoing, Progressing  Intervention: Identify and Manage Fall Risk  Recent Flowsheet Documentation  Taken 8/19/2023 0415 by Sulma Acosta RN  Safety Promotion/Fall Prevention: safety round/check completed  Taken 8/19/2023 0205 by Sulma Acosta RN  Safety Promotion/Fall Prevention: safety round/check completed  Taken 8/19/2023 0025 by Sulma Acosta RN  Safety Promotion/Fall Prevention: safety round/check completed  Taken 8/18/2023 2220 by Sulma Acosta RN  Safety Promotion/Fall Prevention: safety round/check completed  Taken 8/18/2023 2130 by Sulma Acosta RN  Safety Promotion/Fall Prevention: safety round/check completed  Taken 8/18/2023 2045 by Sulma Acosta RN  Safety Promotion/Fall Prevention: safety round/check completed  Intervention: Prevent Skin Injury  Recent Flowsheet Documentation  Taken 8/19/2023 0415 by Sulma Acosta RN  Body Position: position changed independently  Taken 8/19/2023 0205 by Sulma Acosta RN  Body Position: sitting up in bed  Taken 8/19/2023 0025 by Sulma Acosta RN  Body Position:   right   side-lying  Taken 8/18/2023 2220 by Sulma Acosta RN  Body Position: position changed independently  Taken 8/18/2023 2045 by Sulma Acosta RN  Body Position: supine  Intervention: Prevent and Manage VTE (Venous Thromboembolism) Risk  Recent Flowsheet Documentation  Taken 8/18/2023 2045 by Sulma Acosta RN  VTE Prevention/Management: (heparin) other (see comments)  Goal: Optimal Comfort and Wellbeing  Outcome: Ongoing, Progressing  Intervention: Provide Person-Centered Care  Recent Flowsheet Documentation  Taken 8/18/2023 2045 by Sulma Acosta  RN  Trust Relationship/Rapport: care explained  Goal: Readiness for Transition of Care  Outcome: Ongoing, Progressing     Problem: Hypertension Comorbidity  Goal: Blood Pressure in Desired Range  Outcome: Ongoing, Progressing     Problem: Obstructive Sleep Apnea Risk or Actual Comorbidity Management  Goal: Unobstructed Breathing During Sleep  Outcome: Ongoing, Progressing     Problem: Pain Chronic (Persistent) (Comorbidity Management)  Goal: Acceptable Pain Control and Functional Ability  Outcome: Ongoing, Progressing  Intervention: Optimize Psychosocial Wellbeing  Recent Flowsheet Documentation  Taken 8/18/2023 2045 by Sulma Acosta RN  Supportive Measures: active listening utilized     Problem: Skin Injury Risk Increased  Goal: Skin Health and Integrity  Outcome: Ongoing, Progressing  Intervention: Optimize Skin Protection  Recent Flowsheet Documentation  Taken 8/19/2023 0205 by Sulma Acosta RN  Head of Bed (HOB) Positioning: HOB elevated  Taken 8/19/2023 0025 by Sulma Acosta RN  Head of Bed (HOB) Positioning: HOB elevated  Taken 8/18/2023 2045 by Sulma Acosta, RN  Head of Bed (HOB) Positioning: HOB elevated     Problem: Arrhythmia/Dysrhythmia (Cardiac Catheterization)  Goal: Stable Heart Rate and Rhythm  Outcome: Ongoing, Progressing     Problem: Bleeding (Cardiac Catheterization)  Goal: Absence of Bleeding  Outcome: Ongoing, Progressing     Problem: Contrast-Induced Injury Risk (Cardiac Catheterization)  Goal: Absence of Contrast-Induced Injury  Outcome: Ongoing, Progressing     Problem: Embolism (Cardiac Catheterization)  Goal: Absence of Embolism Signs and Symptoms  Outcome: Ongoing, Progressing  Intervention: Prevent or Manage Embolism  Recent Flowsheet Documentation  Taken 8/18/2023 2045 by Sulma Acosta RN  VTE Prevention/Management: (heparin) other (see comments)     Problem: Ongoing Anesthesia/Sedation Effects (Cardiac Catheterization)  Goal: Anesthesia/Sedation Recovery  Outcome: Ongoing,  Progressing  Intervention: Optimize Anesthesia Recovery  Recent Flowsheet Documentation  Taken 8/19/2023 0415 by Sulma Acosta RN  Safety Promotion/Fall Prevention: safety round/check completed  Taken 8/19/2023 0205 by Sulma Acosta RN  Safety Promotion/Fall Prevention: safety round/check completed  Taken 8/19/2023 0025 by Sulma Acosta RN  Safety Promotion/Fall Prevention: safety round/check completed  Taken 8/18/2023 2220 by Sulma Acosta RN  Safety Promotion/Fall Prevention: safety round/check completed  Taken 8/18/2023 2130 by Sulma Acosta RN  Safety Promotion/Fall Prevention: safety round/check completed  Taken 8/18/2023 2045 by Sulma Acosta RN  Safety Promotion/Fall Prevention: safety round/check completed     Problem: Pain (Cardiac Catheterization)  Goal: Acceptable Pain Control  Outcome: Ongoing, Progressing     Problem: Vascular Access Protection (Cardiac Catheterization)  Goal: Absence of Vascular Access Complication  Outcome: Ongoing, Progressing  Intervention: Prevent Access Site Complications  Recent Flowsheet Documentation  Taken 8/19/2023 0205 by Sulma Acosta, RN  Head of Bed (HOB) Positioning: HOB elevated  Taken 8/19/2023 0025 by Sulma Acosta RN  Head of Bed (HOB) Positioning: HOB elevated  Taken 8/18/2023 2045 by Sulma Acosta RN  Head of Bed (HOB) Positioning: HOB elevated   Goal Outcome Evaluation:  Plan of Care Reviewed With: patient        Progress: improving

## 2023-08-19 NOTE — PLAN OF CARE
Report called. Tele called. Pt transferred to [address] [place] [address]

## 2023-08-19 NOTE — OUTREACH NOTE
Prep Survey      Flowsheet Row Responses   [facility] facility patient discharged from? [facility]   Is LACE score < 7 ? No   Eligibility [facility]   Date of Admission 08/17/23   Date of Discharge 08/19/23   Discharge Disposition Home or Self Care   Discharge diagnosis STEMI   Does the patient have one of the following disease processes/diagnoses(primary or secondary)? Acute MI (STEMI,NSTEMI)   Does the patient have Home health ordered? No   Is there a DME ordered? No   Prep survey completed? Yes            RERE A - Registered Nurse

## 2023-08-19 NOTE — DISCHARGE SUMMARY
[facility] Medicine Services  DISCHARGE SUMMARY       Date of Admission: 8/17/2023  Date of Discharge:  8/19/2023  Primary Care Physician: Spring Hollins, CAT, APRN    Presenting Problem/History of Present Illness:  STEMI (ST elevation myocardial infarction) [I21.3]     Final Discharge Diagnoses:  Active Hospital Problems    Diagnosis     **STEMI (ST elevation myocardial infarction)     ST elevation myocardial infarction involving left circumflex coronary artery     CAD (coronary artery disease), native coronary artery        Consults:   Consults       Date and Time Order Name Status Description    8/18/2023  1:10 PM Inpatient Hospitalist Consult              Procedures Performed: Procedure(s):  Left Heart Cath                Pertinent Test Results:   Lab Results (most recent)       Procedure Component Value Units Date/Time    Basic Metabolic Panel [[id number]]  (Abnormal) Collected: 08/18/23 0555    Specimen: Blood Updated: 08/18/23 0712     Glucose 118 mg/dL      BUN 16 mg/dL      Creatinine 0.77 mg/dL      Sodium 141 mmol/L      Potassium 4.1 mmol/L      Chloride 106 mmol/L      CO2 22.0 mmol/L      Calcium 9.5 mg/dL      BUN/Creatinine Ratio 20.8     Anion Gap 13.0 mmol/L      eGFR 86.3 mL/min/1.73     Narrative:      GFR Normal >60  Chronic Kidney Disease <60  Kidney Failure <15      Lipid Panel [[id number]]  (Abnormal) Collected: 08/18/23 0555    Specimen: Blood Updated: 08/18/23 0712     Total Cholesterol 201 mg/dL      Triglycerides 154 mg/dL      HDL Cholesterol 50 mg/dL      LDL Cholesterol  124 mg/dL      VLDL Cholesterol 27 mg/dL      LDL/HDL Ratio 2.40    Narrative:      Cholesterol Reference Ranges  (U.S. Department of Health and Human Services ATP III Classifications)    Desirable          <200 mg/dL  Borderline High    200-239 mg/dL  High Risk          >240 mg/dL      Triglyceride Reference Ranges  (U.S. Department of Health and Human Services ATP III  Classifications)    Normal           <150 mg/dL  Borderline High  150-199 mg/dL  High             200-499 mg/dL  Very High        >500 mg/dL    HDL Reference Ranges  (U.S. Department of Health and Human Services ATP III Classifications)    Low     <40 mg/dl (major risk factor for CHD)  High    >60 mg/dl ('negative' risk factor for CHD)        LDL Reference Ranges  (U.S. Department of Health and Human Services ATP III Classifications)    Optimal          <100 mg/dL  Near Optimal     100-129 mg/dL  Borderline High  130-159 mg/dL  High             160-189 mg/dL  Very High        >189 mg/dL    High Sensitivity Troponin T [[id number]]  (Abnormal) Collected: 08/18/23 0555    Specimen: Blood Updated: 08/18/23 0654     HS Troponin T 1,476 ng/L     Narrative:      High Sensitive Troponin T Reference Range:  <10.0 ng/L- Negative Female for AMI  <15.0 ng/L- Negative Male for AMI  >=10 - Abnormal Female indicating possible myocardial injury.  >=15 - Abnormal Male indicating possible myocardial injury.   Clinicians would have to utilize clinical acumen, EKG, Troponin, and serial changes to determine if it is an Acute Myocardial Infarction or myocardial injury due to an underlying chronic condition.         Hemoglobin A1c [[id number]]  (Normal) Collected: 08/18/23 0555    Specimen: Blood Updated: 08/18/23 0648     Hemoglobin A1C 5.60 %     Narrative:      Hemoglobin A1C Ranges:    Increased Risk for Diabetes  5.7% to 6.4%  Diabetes                     >= 6.5%  Diabetic Goal                < 7.0%    CBC (No Diff) [[id number]]  (Abnormal) Collected: 08/18/23 0555    Specimen: Blood Updated: 08/18/23 0627     WBC 9.12 10*3/mm3      RBC 3.62 10*6/mm3      Hemoglobin 11.6 g/dL      Hematocrit 33.8 %      MCV 93.4 fL      MCH 32.0 pg      MCHC 34.3 g/dL      RDW 13.7 %      RDW-SD 46.8 fl      MPV 9.2 fL      Platelets 351 10*3/mm3     High Sensitivity Troponin T 2Hr [[id number]]  (Abnormal) Collected: 08/17/23 1739    Specimen: Blood  Updated: 08/17/23 1808     HS Troponin T 1,811 ng/L      Troponin T Delta -17 ng/L     Narrative:      High Sensitive Troponin T Reference Range:  <10.0 ng/L- Negative Female for AMI  <15.0 ng/L- Negative Male for AMI  >=10 - Abnormal Female indicating possible myocardial injury.  >=15 - Abnormal Male indicating possible myocardial injury.   Clinicians would have to utilize clinical acumen, EKG, Troponin, and serial changes to determine if it is an Acute Myocardial Infarction or myocardial injury due to an underlying chronic condition.         High Sensitivity Troponin T [[id number]]  (Abnormal) Collected: 08/17/23 1538    Specimen: Blood Updated: 08/17/23 1609     HS Troponin T 1,828 ng/L     Narrative:      High Sensitive Troponin T Reference Range:  <10.0 ng/L- Negative Female for AMI  <15.0 ng/L- Negative Male for AMI  >=10 - Abnormal Female indicating possible myocardial injury.  >=15 - Abnormal Male indicating possible myocardial injury.   Clinicians would have to utilize clinical acumen, EKG, Troponin, and serial changes to determine if it is an Acute Myocardial Infarction or myocardial injury due to an underlying chronic condition.         Magnesium [[id number]]  (Normal) Collected: 08/17/23 1538    Specimen: Blood Updated: 08/17/23 1607     Magnesium 2.4 mg/dL     Comprehensive Metabolic Panel [[id number]]  (Abnormal) Collected: 08/17/23 1538    Specimen: Blood Updated: 08/17/23 1607     Glucose 106 mg/dL      BUN 11 mg/dL      Creatinine 0.53 mg/dL      Sodium 132 mmol/L      Potassium 4.3 mmol/L      Chloride 100 mmol/L      CO2 19.0 mmol/L      Calcium 9.1 mg/dL      Total Protein 7.7 g/dL      Albumin 3.9 g/dL      ALT (SGPT) 20 U/L      AST (SGOT) 68 U/L      Alkaline Phosphatase 81 U/L      Total Bilirubin 0.3 mg/dL      Globulin 3.8 gm/dL      A/G Ratio 1.0 g/dL      BUN/Creatinine Ratio 20.8     Anion Gap 13.0 mmol/L      eGFR 103.4 mL/min/1.73     Narrative:      GFR Normal >60  Chronic Kidney Disease  <60  Kidney Failure <15      CBC & Differential [[id number]]  (Abnormal) Collected: 08/17/23 1538    Specimen: Blood Updated: 08/17/23 1546    Narrative:      The following orders were created for panel order CBC & Differential.  Procedure                               Abnormality         Status                     ---------                               -----------         ------                     CBC Auto Differential[[id number]]        Abnormal            Final result                 Please view results for these tests on the individual orders.    CBC Auto Differential [[id number]]  (Abnormal) Collected: 08/17/23 1538    Specimen: Blood Updated: 08/17/23 1546     WBC 10.51 10*3/mm3      RBC 3.86 10*6/mm3      Hemoglobin 12.3 g/dL      Hematocrit 36.9 %      MCV 95.6 fL      MCH 31.9 pg      MCHC 33.3 g/dL      RDW 13.5 %      RDW-SD 47.5 fl      MPV 9.1 fL      Platelets 317 10*3/mm3      Neutrophil % 88.9 %      Lymphocyte % 8.9 %      Monocyte % 1.4 %      Eosinophil % 0.0 %      Basophil % 0.3 %      Immature Grans % 0.5 %      Neutrophils, Absolute 9.34 10*3/mm3      Lymphocytes, Absolute 0.94 10*3/mm3      Monocytes, Absolute 0.15 10*3/mm3      Eosinophils, Absolute 0.00 10*3/mm3      Basophils, Absolute 0.03 10*3/mm3      Immature Grans, Absolute 0.05 10*3/mm3      nRBC 0.0 /100 WBC           Imaging Results (Most Recent)       Procedure Component Value Units Date/Time    CT Angiogram Coronary [[id number]] Collected: 08/19/23 1243     Updated: 08/19/23 1257    Narrative:      INDICATION:  Chest pain.    TECHNIQUE:  Axial images through the heart were completed without contrast.  Then, IV  contrast was administered and axial images through the heart were completed.  3D  multiplanar and gated reformats of the coronary arteries were completed on a  separate work station under concurrent supervision.    FINDINGS:  The patient's calcium score is 493.    The left ventricular ejection fraction is calculated at 65%.   Sonographic  imaging is unremarkable.    Patient is a somewhat poor candidate for CTA angiographic evaluation secondary  to heavily calcified vessels.    Left main coronary originates from the appropriate cusp.  No significant  stenosis.    LAD: There is significant calcified plaque.  There is also artifact present.  Cannot exclude a significant proximal LAD stenosis.  The mid and distal LAD  appear unremarkable.    Left circumflex: The left circumflex has been stented.  Cannot comment upon  patency.    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  It  is narrowed throughout its inter-arterial course.  It is a small vessel.    PDA: Small vessel but no focal stenosis.  Patient right coronary dominant.    SUMMARY:  1.  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  Right coronary dominant system.    2.  Prominent plaque formation, proximal LAD.  Cannot exclude significant  stenosis of the proximal vessel.    3.  Previous stenting of the circumflex.    4.  Calcium score of 493 with an ejection fraction of 65%.    XR Chest 1 View [[id number]] Collected: 08/17/23 1606     Updated: 08/17/23 1609    Narrative:      XR CHEST 1 VIEW    HISTORY: Post STEMI    COMPARISON: 2 views chest 5/17/2023.    FINDINGS:    The cardiomediastinal silhouette is within normal limits.    No focal consolidation.    The osseous structures are age-appropriate.      Impression:      1.  No acute intrathoracic process.                  Chief Complaint on Day of Discharge: None    Hospital Course:  The patient is a 62 y.o. female who presented to [facility] with chest pain.  Patient was found to have ST elevation myocardial infarction and underwent emergent left heart catheterization.  Culprit vessel was identified as OM 2 and it was stented.  Patient tolerated this well.  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  CT angiography was performed.  Goal-directed medical therapy was initiated and patient was discharged home in good condition.    Condition on Discharge: Hemodynamically stable    Physical Exam on Discharge:  /76 (BP Location: Left arm, Patient Position: Lying)   Pulse 66   Temp 98.2 øF (36.8 øC) (Infrared)   Resp 18   Ht 160 cm (63\")   Wt 62.1 kg (136 lb 12.8 oz)   LMP 07/10/2017   SpO2 96%   BMI 24.23 kg/mý   Physical Exam  Vitals reviewed.   Constitutional:       Appearance: She is well-developed.   HENT:      Head: Normocephalic and atraumatic.   Eyes:      Pupils: Pupils are equal, round, and reactive to light.   Cardiovascular:      Rate and Rhythm: Normal rate and regular rhythm.      Heart sounds: Normal heart sounds. No murmur heard.    No friction rub. No gallop.   Pulmonary:      Effort: Pulmonary effort is normal. No respiratory distress.      Breath sounds: Normal breath sounds. No wheezing or rales.   Chest:      Chest wall: No tenderness.   Abdominal:      General: Bowel sounds are normal. There is no distension.      Palpations: Abdomen is soft.      Tenderness: There is no abdominal tenderness. There is no guarding.   Musculoskeletal:      Cervical back: Normal range of motion and neck supple.   Skin:     General: Skin is warm and dry.   Psychiatric:         Behavior: Behavior normal.         Thought Content: Thought content normal.         Discharge Disposition:  Home or Self Care    Discharge Medications:     Discharge Medications        New Medications        Instructions Start Date   aspirin 81 MG EC tablet   81 mg, Oral, Daily   Start Date: August 20, 2023     lisinopril 5 MG tablet  Commonly known as: PRINIVIL,ZESTRIL   5 mg, Oral, Daily   Start Date: August 20, 2023     metoprolol tartrate 25 MG tablet  Commonly known as: LOPRESSOR   25 mg, Oral, Every 12 Hours Scheduled      nicotine 21 MG/24HR patch  Commonly known " "as: NICODERM   Notes to patient: Use as directed   1 patch, Transdermal, Every 24 Hours, Apply Patch to Clean, Dry, Hairless Area Daily Remove Old Patch Before Applying New Patch Rotate Patch Site Daily May Remove Patch at Bedtime to Prevent Insomnia Follow Other Instructions on Package      nicotine polacrilex 2 MG gum  Commonly known as: NICORETTE   2 mg, Mouth/Throat, Every 1 Hour PRN, Maximum 24 Pieces in 24 hours. Gum Should be Chewed Until it Tingles, Then \"Park\" Between Gums & Cheek.  When Tingling is Gone, Chew Again Until Tingling Returns & Then \"Park\" Between Gums & Cheek Repeat Until Tingling is No Longer Experienced & Then Discard Follow Other Instructions on Package      nitroglycerin 0.4 MG SL tablet  Commonly known as: NITROSTAT   0.4 mg, Sublingual, Every 5 Minutes PRN, Take no more than 3 doses in 15 minutes.      ticagrelor 90 MG tablet tablet  Commonly known as: BRILINTA   90 mg, Oral, 2 Times Daily             Changes to Medications        Instructions Start Date   rosuvastatin 40 MG tablet  Commonly known as: CRESTOR  What changed:   medication strength  how much to take   40 mg, Oral, Nightly             Continue These Medications        Instructions Start Date   omeprazole 20 MG capsule  Commonly known as: priLOSEC   20 mg, Oral, Daily      traMADol 50 MG tablet  Commonly known as: ULTRAM   50 mg, Oral, Every 6 Hours PRN      traZODone 50 MG tablet  Commonly known as: DESYREL   50 mg, Oral, Nightly      vitamin D 1.25 MG (59622 UT) capsule capsule  Commonly known as: ERGOCALCIFEROL  Notes to patient: Take as directed   50,000 Units, Oral, Weekly             Stop These Medications      amLODIPine 5 MG tablet  Commonly known as: NORVASC              Discharge Diet:   Diet Instructions       Diet: Cardiac Diets; Healthy Heart (2-3 Na+); Regular Texture (IDDSI 7); Thin (IDDSI 0)      Discharge Diet: Cardiac Diets    Cardiac Diet: Healthy Heart (2-3 Na+)    Texture: Regular Texture (IDDSI 7)    " Fluid Consistency: Thin (IDDSI 0)            Activity at Discharge:   Activity Instructions       Other Activity Instructions      Activity Instructions: Post heart cath restrictions            Follow-up Appointments:   Future Appointments   Date Time Provider Department Center   9/19/2023  4:00 PM Spring Hollins DNP, APRN [facility]   10/2/2023 10:00 AM Iman Batista APRN [facility]   PCP in 1 to 2 weeks  Dr. Zimmerman in 3 weeks            This document has been electronically signed by Royer Hernandez MD on August 19, 2023 13:09 CDT      Time: 35 minutes

## 2023-08-21 ENCOUNTER — TRANSITIONAL CARE MANAGEMENT TELEPHONE ENCOUNTER (OUTPATIENT)
Dept: CALL CENTER | Facility: HOSPITAL | Age: 63
End: 2023-08-21
Payer: MEDICAID
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
## 2023-08-21 NOTE — PAYOR COMM NOTE
"Alida Moore  [facility]  Case Management Extender  [phone] phone  [phone] fax      Auth# [id number]     Jo Ann Tavares (62 y.o. Female)       Date of Birth   1960    Social Security Number       Address   [address] [place] [address]    Home Phone   [phone]    MRN   [id number]       Holiness   Mandaen    Marital Status   Single                            Admission Date   8/17/23    Admission Type   Emergency    Admitting Provider   Haroldo Zimmerman MD    Attending Provider       Department, Room/Bed   [address], 315/1       Discharge Date   8/19/2023    Discharge Disposition   Home or Self Care    Discharge Destination                                 Attending Provider: (none)   Allergies: Contrast Dye (Echo Or Unknown Ct/mr)    Isolation: None   Infection: None   Code Status: Prior    Ht: 160 cm (63\")   Wt: 62.1 kg (136 lb 12.8 oz)    Admission Cmt: None   Principal Problem: STEMI (ST elevation myocardial infarction) [I21.3]                   Active Insurance as of 8/17/2023       Primary Coverage       Payor Plan Insurance Group Employer/Plan Group    ANTHEM MEDICAID ANTHEM MEDICAID [id number]       Payor Plan Address Payor Plan Phone Number Payor Plan Fax Number Effective Dates    [address] [address] [phone]  10/14/2022 - None Entered    [facility] [id number]         Subscriber Name Subscriber Birth Date Member ID       JO ANN TAVARES 1960 [id number]                     Emergency Contacts        (Rel.) Home Phone Work Phone Mobile Phone    Shahid Tavares (Son) [phone] -- [phone]    ABEL HENDERSON (Friend) [phone] -- [phone]                 Discharge Summary        Royer Hernandez MD at 08/19/23 1012              Psychiatric Medicine Services  DISCHARGE SUMMARY       Date of Admission: " 8/17/2023  Date of Discharge:  8/19/2023  Primary Care Physician: Spring Hollins, DNP, APRN    Presenting Problem/History of Present Illness:  STEMI (ST elevation myocardial infarction) [I21.3]     Final Discharge Diagnoses:  Active Hospital Problems    Diagnosis     **STEMI (ST elevation myocardial infarction)     ST elevation myocardial infarction involving left circumflex coronary artery     CAD (coronary artery disease), native coronary artery        Consults:   Consults       Date and Time Order Name Status Description    8/18/2023  1:10 PM Inpatient Hospitalist Consult              Procedures Performed: Procedure(s):  Left Heart Cath                Pertinent Test Results:   Lab Results (most recent)       Procedure Component Value Units Date/Time    Basic Metabolic Panel [[id number]]  (Abnormal) Collected: 08/18/23 0555    Specimen: Blood Updated: 08/18/23 0712     Glucose 118 mg/dL      BUN 16 mg/dL      Creatinine 0.77 mg/dL      Sodium 141 mmol/L      Potassium 4.1 mmol/L      Chloride 106 mmol/L      CO2 22.0 mmol/L      Calcium 9.5 mg/dL      BUN/Creatinine Ratio 20.8     Anion Gap 13.0 mmol/L      eGFR 86.3 mL/min/1.73     Narrative:      GFR Normal >60  Chronic Kidney Disease <60  Kidney Failure <15      Lipid Panel [[id number]]  (Abnormal) Collected: 08/18/23 0555    Specimen: Blood Updated: 08/18/23 0712     Total Cholesterol 201 mg/dL      Triglycerides 154 mg/dL      HDL Cholesterol 50 mg/dL      LDL Cholesterol  124 mg/dL      VLDL Cholesterol 27 mg/dL      LDL/HDL Ratio 2.40    Narrative:      Cholesterol Reference Ranges  (U.S. Department of Health and Human Services ATP III Classifications)    Desirable          <200 mg/dL  Borderline High    200-239 mg/dL  High Risk          >240 mg/dL      Triglyceride Reference Ranges  (U.S. Department of Health and Human Services ATP III Classifications)    Normal           <150 mg/dL  Borderline High  150-199 mg/dL  High             200-499 mg/dL  Very High         >500 mg/dL    HDL Reference Ranges  (U.S. Department of Health and Human Services ATP III Classifications)    Low     <40 mg/dl (major risk factor for CHD)  High    >60 mg/dl ('negative' risk factor for CHD)        LDL Reference Ranges  (U.S. Department of Health and Human Services ATP III Classifications)    Optimal          <100 mg/dL  Near Optimal     100-129 mg/dL  Borderline High  130-159 mg/dL  High             160-189 mg/dL  Very High        >189 mg/dL    High Sensitivity Troponin T [[id number]]  (Abnormal) Collected: 08/18/23 0555    Specimen: Blood Updated: 08/18/23 0654     HS Troponin T 1,476 ng/L     Narrative:      High Sensitive Troponin T Reference Range:  <10.0 ng/L- Negative Female for AMI  <15.0 ng/L- Negative Male for AMI  >=10 - Abnormal Female indicating possible myocardial injury.  >=15 - Abnormal Male indicating possible myocardial injury.   Clinicians would have to utilize clinical acumen, EKG, Troponin, and serial changes to determine if it is an Acute Myocardial Infarction or myocardial injury due to an underlying chronic condition.         Hemoglobin A1c [[id number]]  (Normal) Collected: 08/18/23 0555    Specimen: Blood Updated: 08/18/23 0648     Hemoglobin A1C 5.60 %     Narrative:      Hemoglobin A1C Ranges:    Increased Risk for Diabetes  5.7% to 6.4%  Diabetes                     >= 6.5%  Diabetic Goal                < 7.0%    CBC (No Diff) [[id number]]  (Abnormal) Collected: 08/18/23 0555    Specimen: Blood Updated: 08/18/23 0627     WBC 9.12 10*3/mm3      RBC 3.62 10*6/mm3      Hemoglobin 11.6 g/dL      Hematocrit 33.8 %      MCV 93.4 fL      MCH 32.0 pg      MCHC 34.3 g/dL      RDW 13.7 %      RDW-SD 46.8 fl      MPV 9.2 fL      Platelets 351 10*3/mm3     High Sensitivity Troponin T 2Hr [[id number]]  (Abnormal) Collected: 08/17/23 1739    Specimen: Blood Updated: 08/17/23 1808     HS Troponin T 1,811 ng/L      Troponin T Delta -17 ng/L     Narrative:      High Sensitive Troponin T  Reference Range:  <10.0 ng/L- Negative Female for AMI  <15.0 ng/L- Negative Male for AMI  >=10 - Abnormal Female indicating possible myocardial injury.  >=15 - Abnormal Male indicating possible myocardial injury.   Clinicians would have to utilize clinical acumen, EKG, Troponin, and serial changes to determine if it is an Acute Myocardial Infarction or myocardial injury due to an underlying chronic condition.         High Sensitivity Troponin T [[id number]]  (Abnormal) Collected: 08/17/23 1538    Specimen: Blood Updated: 08/17/23 1609     HS Troponin T 1,828 ng/L     Narrative:      High Sensitive Troponin T Reference Range:  <10.0 ng/L- Negative Female for AMI  <15.0 ng/L- Negative Male for AMI  >=10 - Abnormal Female indicating possible myocardial injury.  >=15 - Abnormal Male indicating possible myocardial injury.   Clinicians would have to utilize clinical acumen, EKG, Troponin, and serial changes to determine if it is an Acute Myocardial Infarction or myocardial injury due to an underlying chronic condition.         Magnesium [[id number]]  (Normal) Collected: 08/17/23 1538    Specimen: Blood Updated: 08/17/23 1607     Magnesium 2.4 mg/dL     Comprehensive Metabolic Panel [[id number]]  (Abnormal) Collected: 08/17/23 1538    Specimen: Blood Updated: 08/17/23 1607     Glucose 106 mg/dL      BUN 11 mg/dL      Creatinine 0.53 mg/dL      Sodium 132 mmol/L      Potassium 4.3 mmol/L      Chloride 100 mmol/L      CO2 19.0 mmol/L      Calcium 9.1 mg/dL      Total Protein 7.7 g/dL      Albumin 3.9 g/dL      ALT (SGPT) 20 U/L      AST (SGOT) 68 U/L      Alkaline Phosphatase 81 U/L      Total Bilirubin 0.3 mg/dL      Globulin 3.8 gm/dL      A/G Ratio 1.0 g/dL      BUN/Creatinine Ratio 20.8     Anion Gap 13.0 mmol/L      eGFR 103.4 mL/min/1.73     Narrative:      GFR Normal >60  Chronic Kidney Disease <60  Kidney Failure <15      CBC & Differential [[id number]]  (Abnormal) Collected: 08/17/23 1538    Specimen: Blood Updated:  08/17/23 1546    Narrative:      The following orders were created for panel order CBC & Differential.  Procedure                               Abnormality         Status                     ---------                               -----------         ------                     CBC Auto Differential[[id number]]        Abnormal            Final result                 Please view results for these tests on the individual orders.    CBC Auto Differential [[id number]]  (Abnormal) Collected: 08/17/23 1538    Specimen: Blood Updated: 08/17/23 1546     WBC 10.51 10*3/mm3      RBC 3.86 10*6/mm3      Hemoglobin 12.3 g/dL      Hematocrit 36.9 %      MCV 95.6 fL      MCH 31.9 pg      MCHC 33.3 g/dL      RDW 13.5 %      RDW-SD 47.5 fl      MPV 9.1 fL      Platelets 317 10*3/mm3      Neutrophil % 88.9 %      Lymphocyte % 8.9 %      Monocyte % 1.4 %      Eosinophil % 0.0 %      Basophil % 0.3 %      Immature Grans % 0.5 %      Neutrophils, Absolute 9.34 10*3/mm3      Lymphocytes, Absolute 0.94 10*3/mm3      Monocytes, Absolute 0.15 10*3/mm3      Eosinophils, Absolute 0.00 10*3/mm3      Basophils, Absolute 0.03 10*3/mm3      Immature Grans, Absolute 0.05 10*3/mm3      nRBC 0.0 /100 WBC           Imaging Results (Most Recent)       Procedure Component Value Units Date/Time    CT Angiogram Coronary [[id number]] Collected: 08/19/23 1243     Updated: 08/19/23 1257    Narrative:      INDICATION:  Chest pain.    TECHNIQUE:  Axial images through the heart were completed without contrast.  Then, IV  contrast was administered and axial images through the heart were completed.  3D  multiplanar and gated reformats of the coronary arteries were completed on a  separate work station under concurrent supervision.    FINDINGS:  The patient's calcium score is 493.    The left ventricular ejection fraction is calculated at 65%.  Sonographic  imaging is unremarkable.    Patient is a somewhat poor candidate for CTA angiographic evaluation secondary  to  heavily calcified vessels.    Left main coronary originates from the appropriate cusp.  No significant  stenosis.    LAD: There is significant calcified plaque.  There is also artifact present.  Cannot exclude a significant proximal LAD stenosis.  The mid and distal LAD  appear unremarkable.    Left circumflex: The left circumflex has been stented.  Cannot comment upon  patency.    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  It  is narrowed throughout its inter-arterial course.  It is a small vessel.    PDA: Small vessel but no focal stenosis.  Patient right coronary dominant.    SUMMARY:  1.  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  Right coronary dominant system.    2.  Prominent plaque formation, proximal LAD.  Cannot exclude significant  stenosis of the proximal vessel.    3.  Previous stenting of the circumflex.    4.  Calcium score of 493 with an ejection fraction of 65%.    XR Chest 1 View [[id number]] Collected: 08/17/23 1606     Updated: 08/17/23 1609    Narrative:      XR CHEST 1 VIEW    HISTORY: Post STEMI    COMPARISON: 2 views chest 5/17/2023.    FINDINGS:    The cardiomediastinal silhouette is within normal limits.    No focal consolidation.    The osseous structures are age-appropriate.      Impression:      1.  No acute intrathoracic process.                  Chief Complaint on Day of Discharge: None    Hospital Course:  The patient is a 62 y.o. female who presented to [facility] with chest pain.  Patient was found to have ST elevation myocardial infarction and underwent emergent left heart catheterization.  Culprit vessel was identified as OM 2 and it was stented.  Patient tolerated this well.  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  CT angiography was performed.  Goal-directed medical therapy was  "initiated and patient was discharged home in good condition.    Condition on Discharge: Hemodynamically stable    Physical Exam on Discharge:  /76 (BP Location: Left arm, Patient Position: Lying)   Pulse 66   Temp 98.2 øF (36.8 øC) (Infrared)   Resp 18   Ht 160 cm (63\")   Wt 62.1 kg (136 lb 12.8 oz)   LMP 07/10/2017   SpO2 96%   BMI 24.23 kg/mý   Physical Exam  Vitals reviewed.   Constitutional:       Appearance: She is well-developed.   HENT:      Head: Normocephalic and atraumatic.   Eyes:      Pupils: Pupils are equal, round, and reactive to light.   Cardiovascular:      Rate and Rhythm: Normal rate and regular rhythm.      Heart sounds: Normal heart sounds. No murmur heard.    No friction rub. No gallop.   Pulmonary:      Effort: Pulmonary effort is normal. No respiratory distress.      Breath sounds: Normal breath sounds. No wheezing or rales.   Chest:      Chest wall: No tenderness.   Abdominal:      General: Bowel sounds are normal. There is no distension.      Palpations: Abdomen is soft.      Tenderness: There is no abdominal tenderness. There is no guarding.   Musculoskeletal:      Cervical back: Normal range of motion and neck supple.   Skin:     General: Skin is warm and dry.   Psychiatric:         Behavior: Behavior normal.         Thought Content: Thought content normal.         Discharge Disposition:  Home or Self Care    Discharge Medications:     Discharge Medications        New Medications        Instructions Start Date   aspirin 81 MG EC tablet   81 mg, Oral, Daily   Start Date: August 20, 2023     lisinopril 5 MG tablet  Commonly known as: PRINIVIL,ZESTRIL   5 mg, Oral, Daily   Start Date: August 20, 2023     metoprolol tartrate 25 MG tablet  Commonly known as: LOPRESSOR   25 mg, Oral, Every 12 Hours Scheduled      nicotine 21 MG/24HR patch  Commonly known as: NICODERM CQ  Notes to patient: Use as directed   1 patch, Transdermal, Every 24 Hours, Apply Patch to Clean, Dry, Hairless " "Area Daily Remove Old Patch Before Applying New Patch Rotate Patch Site Daily May Remove Patch at Bedtime to Prevent Insomnia Follow Other Instructions on Package      nicotine polacrilex 2 MG gum  Commonly known as: NICORETTE   2 mg, Mouth/Throat, Every 1 Hour PRN, Maximum 24 Pieces in 24 hours. Gum Should be Chewed Until it Tingles, Then \"Park\" Between Gums & Cheek.  When Tingling is Gone, Chew Again Until Tingling Returns & Then \"Park\" Between Gums & Cheek Repeat Until Tingling is No Longer Experienced & Then Discard Follow Other Instructions on Package      nitroglycerin 0.4 MG SL tablet  Commonly known as: NITROSTAT   0.4 mg, Sublingual, Every 5 Minutes PRN, Take no more than 3 doses in 15 minutes.      ticagrelor 90 MG tablet tablet  Commonly known as: BRILINTA   90 mg, Oral, 2 Times Daily             Changes to Medications        Instructions Start Date   rosuvastatin 40 MG tablet  Commonly known as: CRESTOR  What changed:   medication strength  how much to take   40 mg, Oral, Nightly             Continue These Medications        Instructions Start Date   omeprazole 20 MG capsule  Commonly known as: priLOSEC   20 mg, Oral, Daily      traMADol 50 MG tablet  Commonly known as: ULTRAM   50 mg, Oral, Every 6 Hours PRN      traZODone 50 MG tablet  Commonly known as: DESYREL   50 mg, Oral, Nightly      vitamin D 1.25 MG (37624 UT) capsule capsule  Commonly known as: ERGOCALCIFEROL  Notes to patient: Take as directed   50,000 Units, Oral, Weekly             Stop These Medications      amLODIPine 5 MG tablet  Commonly known as: NORVASC              Discharge Diet:   Diet Instructions       Diet: Cardiac Diets; Healthy Heart (2-3 Na+); Regular Texture (IDDSI 7); Thin (IDDSI 0)      Discharge Diet: Cardiac Diets    Cardiac Diet: Healthy Heart (2-3 Na+)    Texture: Regular Texture (IDDSI 7)    Fluid Consistency: Thin (IDDSI 0)            Activity at Discharge:   Activity Instructions       Other Activity Instructions   "    Activity Instructions: Post heart cath restrictions            Follow-up Appointments:   Future Appointments   Date Time Provider Department Center   9/19/2023  4:00 PM Spring Hollins DNP, APRN [facility]   10/2/2023 10:00 AM Iman Batista, R[facility]   PCP in 1 to 2 weeks  Dr. Zimmerman in 3 weeks            This document has been electronically signed by Royer Hernandez MD on August 19, 2023 13:09 CDT      Time: 35 minutes                Electronically signed by Royer Hernandez MD at 08/19/23 1312       Discharge Order (From admission, onward)       Start     Ordered    08/19/23 1039  Discharge patient  Once        Expected Discharge Date: 08/19/23   Expected Discharge Time: Morning   Discharge Disposition: Home or Self Care   Physician of Record for Attribution - Please select from Treatment Team: ERNESTO ZIMMERMAN [[id number]]   Review needed by CMO to determine Physician of Record: No      Question Answer Comment   Physician of Record for Attribution - Please select from Treatment Team ERNESTO ZIMMERMAN    Review needed by CMO to determine Physician of Record No        08/19/23 1039

## 2023-08-23 ENCOUNTER — NURSE TRIAGE (OUTPATIENT)
Dept: CALL CENTER | Facility: HOSPITAL | Age: 63
End: 2023-08-23
Payer: MEDICAID

## 2023-08-23 NOTE — TELEPHONE ENCOUNTER
Caller with questions on how to take NTG and Prilosec. Discharged on 2023 post heart cath and stent placement for STEMI.   Caller denies any chest pain or pressure currently. All questions answered. Patient needing to schedule appt with Dr. Zimmerman and states has had trouble getting thru to office. This RN called Dr. Zimmerman's office , left message with name, , MRN of patient and need for f/u appt. Post STEMI and stent placement 2023. Patient number given.    Also discussed with patient that she states he has very high anxiety levels re: new Dx, losing insurance in November, and knowing when to take Prilosec as well. Offered reassurance and instructed to take Prilosec daily as ordered preferably before meal.  Caller verbalized understanding. Encouraged to call back with any other questions or concerns.    Reason for Disposition   Caller has medicine question only, adult not sick, and triager answers question    Additional Information   Negative: Intentional drug overdose and suicidal thoughts or ideas   Negative: Drug overdose and triager unable to answer question   Negative: Caller requesting a renewal or refill of a medicine patient is currently taking   Negative: Caller requesting information unrelated to medicine   Negative: Caller requesting information about COVID-19 Vaccine   Negative: Caller requesting information about Emergency Contraception   Negative: Caller requesting information about Combined Birth Control Pills   Negative: Caller requesting information about Progestin Birth Control Pills   Negative: Caller requesting information about Post-Op pain or medicines   Negative: Caller requesting a prescription antibiotic (such as penicillin) for Strep throat and has a positive culture result   Negative: Caller requesting a prescription anti-viral med (such as Tamiflu) and has influenza (flu) symptoms   Negative: Immunization reaction suspected   Negative: Rash while taking a medicine or within 3  days of stopping it   Negative: Asthma and having symptoms of asthma (cough, wheezing, etc.)   Negative: Symptom of illness (e.g., headache, abdominal pain, earache, vomiting) that are more than mild   Negative: Breastfeeding questions about mother's medicines and diet   Negative: MORE THAN A DOUBLE DOSE of a prescription or over-the-counter (OTC) drug   Negative: DOUBLE DOSE (an extra dose or lesser amount) of prescription drug and any symptoms (e.g., dizziness, nausea, pain, sleepiness)   Negative: DOUBLE DOSE (an extra dose or lesser amount) of over-the-counter (OTC) drug and any symptoms (e.g., dizziness, nausea, pain, sleepiness)   Negative: Took another person's prescription drug   Negative: DOUBLE DOSE (an extra dose or lesser amount) of prescription drug and NO symptoms  (Exception: A double dose of antibiotics.)   Negative: Diabetes drug error or overdose (e.g., took wrong type of insulin or took extra dose)   Negative: Caller has medication question about med NOT prescribed by PCP and triager unable to answer question (e.g., compatibility with other med, storage)   Negative: Prescription not at pharmacy and was prescribed by PCP recently  (Exception: triager has access to EMR and prescription is recorded there. Go to Home Care and confirm for pharmacy.)   Negative: Pharmacy calling with prescription question and triager unable to answer question   Negative: Caller has URGENT medicine question about med that PCP or specialist prescribed and triager unable to answer question   Negative: Caller has NON-URGENT medicine question about med that PCP or specialist prescribed and triager unable to answer question   Negative: Caller wants to use a complementary or alternative medicine   Negative: Medicine patch causing local rash or itching   Negative: Prescription request for new medicine (not a refill)   Negative: Prescription prescribed recently is not at pharmacy and triager has access to patient's EMR and  "prescription is recorded in the EMR   Negative: DOUBLE DOSE (an extra dose or lesser amount) of over-the-counter (OTC) drug and NO symptoms   Negative: DOUBLE DOSE (an extra dose or lesser amount) of antibiotic drug and NO symptoms    Answer Assessment - Initial Assessment Questions  1. NAME of MEDICINE: \"What medicine(s) are you calling about?\"        2. QUESTION: \"What is your question?\" (e.g., double dose of medicine, side effect)      How to take NTG  3. PRESCRIBER: \"Who prescribed the medicine?\" Reason: if prescribed by specialist, call should be referred to that group.      Hospitalist  4. SYMPTOMS: \"Do you have any symptoms?\" If Yes, ask: \"What symptoms are you having?\"  \"How bad are the symptoms (e.g., mild, moderate, severe)      Not currently  5. PREGNANCY:  \"Is there any chance that you are pregnant?\" \"When was your last menstrual period?\"      N/A    Protocols used: Medication Question Call-ADULT-[place]    "

## 2023-08-24 LAB
QT INTERVAL: 362 MS
QTC INTERVAL: 438 MS

## 2023-08-25 ENCOUNTER — PATIENT OUTREACH (OUTPATIENT)
Dept: CASE MANAGEMENT | Facility: OTHER | Age: 63
End: 2023-08-25
Payer: MEDICAID

## 2023-08-25 NOTE — OUTREACH NOTE
"AMBULATORY CASE MANAGEMENT NOTE    Name and Relationship of Patient/Support Person: Jo Ann Lacy - Self    Patient Outreach    Received handoff from nurse call center. Called pt and was on the phone for over one hour. Pt very anxious and upset. Active listening performed. Reassurance provided. Pt needing to schedule appt with Dr Zimmerman but has had trouble getting through to office. Tried to call office 3-way with pt but got a busy signal. Will send an inbasket msg to the clinical staff and request they call pt to schedule appt. Pt reports her car is broke down and she has no transportation to get to the store or appts. She is not eligible for insurance to pay for PACS due to her having a car in her name, and she states she cannot afford to pay for transportation. Pt denies having any assistance or support at home. She said she is going to call a friend in [place] to see if hopefully he can help provide the money to fix her car. Pt also reports that she is losing her insurance in September, and she does not qualify for medicare until November when she turns 63. Highly encd her to call insurance to see about possibly reapplying or renewing insurance so she does not have a lapse in coverage. Pt reports she has all of her medications except the nicotine patches or gum, she states she \"will not take them\" because she has heard from others that they make you feel sick. Provided 24/7 nurse line number and encd her to call with any questions or concerns.    Lucia AVITIA  Ambulatory Case Management    8/25/2023, 15:00 EDT  "

## 2023-08-27 LAB
QT INTERVAL: 488 MS
QTC INTERVAL: 449 MS

## 2023-08-28 ENCOUNTER — TELEPHONE (OUTPATIENT)
Dept: CARDIOLOGY | Facility: CLINIC | Age: 63
End: 2023-08-28
Payer: MEDICAID

## 2023-08-28 NOTE — TELEPHONE ENCOUNTER
Contacted pt to let them know appt with Dr. Zimmerman 10/03/2023 @ 11:15.     Voiced understanding.       ----- Message from Polly Dow sent at 8/28/2023  7:39 AM CDT -----  Regarding: FW: Appointment  Please advise. Thanks   ----- Message -----  From: Lucia Bhagat RN  Sent: 8/25/2023   4:08 PM CDT  To: [facility], #  Subject: Appointment                                      Hi, pt is needing to schedule an appt with Dr Zimmerman but having trouble getting through to the office. Post STEMI and stent placement 8/17/23. Could someone please give her a call to schedule? Her number is [phone].     Thanks,  Lucia

## 2023-08-28 NOTE — TELEPHONE ENCOUNTER
Attempted to call the pt. Not available at this time       ----- Message from Polly Dow sent at 8/28/2023  7:39 AM CDT -----  Regarding: FW: Appointment  Please advise. Thanks   ----- Message -----  From: Lucia Bhagat RN  Sent: 8/25/2023   4:08 PM CDT  To: [facility], #  Subject: Appointment                                      Hi, pt is needing to schedule an appt with Dr Zimmerman but having trouble getting through to the office. Post STEMI and stent placement 8/17/23. Could someone please give her a call to schedule? Her number is [phone].     Thanks,  Lucia

## 2023-09-06 ENCOUNTER — OFFICE VISIT (OUTPATIENT)
Dept: FAMILY MEDICINE CLINIC | Facility: CLINIC | Age: 63
End: 2023-09-06
Payer: MEDICAID

## 2023-09-06 VITALS
BODY MASS INDEX: 24.33 KG/M2 | DIASTOLIC BLOOD PRESSURE: 70 MMHG | SYSTOLIC BLOOD PRESSURE: 142 MMHG | HEART RATE: 83 BPM | WEIGHT: 137.3 LBS | HEIGHT: 63 IN | RESPIRATION RATE: 20 BRPM | OXYGEN SATURATION: 98 % | TEMPERATURE: 98.3 F

## 2023-09-06 DIAGNOSIS — Z12.31 ENCOUNTER FOR SCREENING MAMMOGRAM FOR BREAST CANCER: ICD-10-CM

## 2023-09-06 DIAGNOSIS — E55.9 VITAMIN D DEFICIENCY: Chronic | ICD-10-CM

## 2023-09-06 DIAGNOSIS — I25.10 CORONARY ARTERY DISEASE INVOLVING NATIVE CORONARY ARTERY OF NATIVE HEART, UNSPECIFIED WHETHER ANGINA PRESENT: Primary | ICD-10-CM

## 2023-09-06 DIAGNOSIS — Z78.0 POSTMENOPAUSAL: Chronic | ICD-10-CM

## 2023-09-06 DIAGNOSIS — Z13.820 SCREENING FOR OSTEOPOROSIS: ICD-10-CM

## 2023-09-06 DIAGNOSIS — I10 ESSENTIAL HYPERTENSION: Chronic | ICD-10-CM

## 2023-09-06 DIAGNOSIS — E78.5 DYSLIPIDEMIA: Chronic | ICD-10-CM

## 2023-09-06 PROCEDURE — 1160F RVW MEDS BY RX/DR IN RCRD: CPT | Performed by: NURSE PRACTITIONER

## 2023-09-06 PROCEDURE — 99214 OFFICE O/P EST MOD 30 MIN: CPT | Performed by: NURSE PRACTITIONER

## 2023-09-06 PROCEDURE — 1159F MED LIST DOCD IN RCRD: CPT | Performed by: NURSE PRACTITIONER

## 2023-09-06 PROCEDURE — 3078F DIAST BP <80 MM HG: CPT | Performed by: NURSE PRACTITIONER

## 2023-09-06 PROCEDURE — 3077F SYST BP >= 140 MM HG: CPT | Performed by: NURSE PRACTITIONER

## 2023-09-06 RX ORDER — ASPIRIN 81 MG/1
81 TABLET ORAL DAILY
Qty: 90 TABLET | Refills: 1 | Status: SHIPPED | OUTPATIENT
Start: 2023-09-06

## 2023-09-06 RX ORDER — NICOTINE 21 MG/24HR
1 PATCH, TRANSDERMAL 24 HOURS TRANSDERMAL EVERY 24 HOURS
Qty: 28 PATCH | Refills: 0 | Status: SHIPPED | OUTPATIENT
Start: 2023-09-06

## 2023-09-06 RX ORDER — LISINOPRIL 5 MG/1
5 TABLET ORAL DAILY
Qty: 90 TABLET | Refills: 1 | Status: SHIPPED | OUTPATIENT
Start: 2023-09-06

## 2023-09-12 ENCOUNTER — NURSE TRIAGE (OUTPATIENT)
Dept: CALL CENTER | Facility: HOSPITAL | Age: 63
End: 2023-09-12
Payer: MEDICAID

## 2023-09-12 NOTE — TELEPHONE ENCOUNTER
Patient calling to check on when to take all of her medications.  She states she has gotten off schedule with taking her meds since she got home from the hospital and she was wondering if she needed to skip a dose or go ahead and take it now?  We discussed her meds, which ones she takes on routine basis and which ones she takes on as needed basis, and talked about when to take each one during this call.  Protocols discussed, pt verbalized understanding.  She will call back if needed or has other questions.

## 2023-09-12 NOTE — TELEPHONE ENCOUNTER
Reason for Disposition  • Caller has medicine question only, adult not sick, AND triager answers question    Additional Information  • Negative: [1] Intentional drug overdose AND [2] suicidal thoughts or ideas  • Negative: Drug overdose and triager unable to answer question  • Negative: Caller requesting a renewal or refill of a medicine patient is currently taking  • Negative: Caller requesting information unrelated to medicine  • Negative: Caller requesting information about COVID-19 Vaccine  • Negative: Caller requesting information about Emergency Contraception  • Negative: Caller requesting information about Combined Birth Control Pills  • Negative: Caller requesting information about Progestin Birth Control Pills  • Negative: Caller requesting information about Post-Op pain or medicines  • Negative: Caller requesting a prescription antibiotic (such as Penicillin) for Strep throat and has a positive culture result  • Negative: Caller requesting a prescription anti-viral med (such as Tamiflu) and has influenza (flu) symptoms  • Negative: Immunization reaction suspected  • Negative: Rash while taking a medicine or within 3 days of stopping it  • Negative: [1] Asthma and [2] having symptoms of asthma (cough, wheezing, etc.)  • Negative: [1] Symptom of illness (e.g., headache, abdominal pain, earache, vomiting) AND [2] more than mild  • Negative: Breastfeeding questions about mother's medicines and diet  • Negative: MORE THAN A DOUBLE DOSE of a prescription or over-the-counter (OTC) drug  • Negative: [1] DOUBLE DOSE (an extra dose or lesser amount) of prescription drug AND [2] any symptoms (e.g., dizziness, nausea, pain, sleepiness)  • Negative: [1] DOUBLE DOSE (an extra dose or lesser amount) of over-the-counter (OTC) drug AND [2] any symptoms (e.g., dizziness, nausea, pain, sleepiness)  • Negative: Took another person's prescription drug  • Negative: [1] DOUBLE DOSE (an extra dose or lesser amount) of  prescription drug AND [2] NO symptoms  (Exception: A double dose of antibiotics.)  • Negative: Diabetes drug error or overdose (e.g., took wrong type of insulin or took extra dose)  • Negative: [1] Prescription not at pharmacy AND [2] was prescribed by PCP recently (Exception: Triager has access to EMR and prescription is recorded there. Go to Home [facility] and confirm for pharmacy.)  • Negative: [1] Pharmacy calling with prescription question AND [2] triager unable to answer question  • Negative: [1] Caller has URGENT medicine question about med that PCP or specialist prescribed AND [2] triager unable to answer question  • Negative: Medicine patch causing local rash or itching  • Negative: [1] Caller has medicine question about med NOT prescribed by PCP AND [2] triager unable to answer question (e.g., compatibility with other med, storage)  • Negative: Prescription request for new medicine (not a refill)  • Negative: [1] Caller has NON-URGENT medicine question about med that PCP prescribed AND [2] triager unable to answer question  • Negative: Caller wants to use a complementary or alternative medicine  • Negative: [1] Prescription prescribed recently is not at pharmacy AND [2] triager has access to patient's EMR AND [3] prescription is recorded in the EMR  • Negative: [1] DOUBLE DOSE (an extra dose or lesser amount) of over-the-counter (OTC) drug AND [2] NO symptoms  • Negative: [1] DOUBLE DOSE (an extra dose or lesser amount) of antibiotic drug AND [2] NO symptoms  • Negative: Caller has medicine question, adult has minor symptoms, caller declines triage, AND triager answers question  • Negative: Caller requesting information about medicine during pregnancy; adult is not ill AND triager answers question  • Negative: Caller requesting information about medicine use with breastfeeding; neither adult nor infant is ill, triager answers question  • Negative: Pharmacy calling with prescription question and triager answers  "question    Answer Assessment - Initial Assessment Questions  1. NAME of MEDICINE: \"What medicine(s) are you calling about?\"      Brillinta and tramadol and trazadone and metoprolol  2. QUESTION: \"What is your question?\" (e.g., double dose of medicine, side effect)      Best time to take meds   3. PRESCRIBER: \"Who prescribed the medicine?\" Reason: if prescribed by specialist, call should be referred to that group.      PCP  4. SYMPTOMS: \"Do you have any symptoms?\" If Yes, ask: \"What symptoms are you having?\"  \"How bad are the symptoms (e.g., mild, moderate, severe)      High bp and chest pain at times   5. PREGNANCY:  \"Is there any chance that you are pregnant?\" \"When was your last menstrual period?\"      no    Protocols used: Medication Question Call-ADULT-    "

## 2023-09-13 ENCOUNTER — OFFICE VISIT (OUTPATIENT)
Dept: FAMILY MEDICINE CLINIC | Facility: CLINIC | Age: 63
End: 2023-09-13
Payer: MEDICAID

## 2023-09-13 VITALS
DIASTOLIC BLOOD PRESSURE: 84 MMHG | SYSTOLIC BLOOD PRESSURE: 140 MMHG | OXYGEN SATURATION: 98 % | WEIGHT: 135 LBS | HEIGHT: 63 IN | BODY MASS INDEX: 23.92 KG/M2 | HEART RATE: 77 BPM | TEMPERATURE: 97.9 F

## 2023-09-13 DIAGNOSIS — R07.89 CHEST TIGHTNESS: ICD-10-CM

## 2023-09-13 DIAGNOSIS — R10.13 EPIGASTRIC PAIN: Primary | ICD-10-CM

## 2023-09-13 DIAGNOSIS — M25.562 CHRONIC PAIN OF LEFT KNEE: Chronic | ICD-10-CM

## 2023-09-13 DIAGNOSIS — G89.29 CHRONIC PAIN OF LEFT KNEE: Chronic | ICD-10-CM

## 2023-09-13 DIAGNOSIS — I25.10 CORONARY ARTERY DISEASE INVOLVING NATIVE CORONARY ARTERY OF NATIVE HEART, UNSPECIFIED WHETHER ANGINA PRESENT: Chronic | ICD-10-CM

## 2023-09-13 DIAGNOSIS — F41.8 ANXIETY ABOUT HEALTH: ICD-10-CM

## 2023-09-13 LAB
EXPIRATION DATE: ABNORMAL
HGB BLDA-MCNC: 11.8 G/DL (ref 12–17)
Lab: ABNORMAL

## 2023-09-13 PROCEDURE — 93010 ELECTROCARDIOGRAM REPORT: CPT | Performed by: INTERNAL MEDICINE

## 2023-09-13 PROCEDURE — 3079F DIAST BP 80-89 MM HG: CPT | Performed by: NURSE PRACTITIONER

## 2023-09-13 PROCEDURE — 1160F RVW MEDS BY RX/DR IN RCRD: CPT | Performed by: NURSE PRACTITIONER

## 2023-09-13 PROCEDURE — 1159F MED LIST DOCD IN RCRD: CPT | Performed by: NURSE PRACTITIONER

## 2023-09-13 PROCEDURE — 3077F SYST BP >= 140 MM HG: CPT | Performed by: NURSE PRACTITIONER

## 2023-09-13 PROCEDURE — 93005 ELECTROCARDIOGRAM TRACING: CPT | Performed by: NURSE PRACTITIONER

## 2023-09-13 PROCEDURE — 99215 OFFICE O/P EST HI 40 MIN: CPT | Performed by: NURSE PRACTITIONER

## 2023-09-13 PROCEDURE — 85018 HEMOGLOBIN: CPT | Performed by: NURSE PRACTITIONER

## 2023-09-13 RX ORDER — FOLIC ACID 1 MG/1
1 TABLET ORAL DAILY
COMMUNITY
Start: 2023-09-06

## 2023-09-13 RX ORDER — SUCRALFATE 1 G/1
1 TABLET ORAL 4 TIMES DAILY
Qty: 56 TABLET | Refills: 0 | Status: SHIPPED | OUTPATIENT
Start: 2023-09-13 | End: 2023-09-27

## 2023-09-13 RX ORDER — PIMECROLIMUS 1 %
1 CREAM (GRAM) TOPICAL 2 TIMES DAILY
COMMUNITY
Start: 2023-09-06

## 2023-09-13 NOTE — PROGRESS NOTES
"Chief Complaint  Abdominal Pain (N/V cramping mid stomach tender)    Subjective          Jo Ann Lacy presents to [facility] [place]    History of Present Illness  [facility]      PCP: RODNEY Ignacio    CC: \"abdominal pain\"    C/O epigastric pain that radiates into chest that began on 9-9-2023.  Had similar symptoms in January of this year and labs for pancreatitis, h.pylori were negative and she responded well to Carafate.  Has omeprazole, but unclear how often she is actually taking.  Describes as \"cramps or knots\" in stomach.  Having normal BM's and denies n/v.  Last EGD was possibly in 2016. Had colon resection in June 2023 for atubular adenomatous polyps,  No cancer was present.  Had MI with cardiac cath and stent placement on 8-.  Has been told she may require an additional stent and follow up with cardiology on 10-3-2023.  Having increased stress worrying about her heart.  Denies chest pain, dyspnea, dizziness, swelling.  Continues to smoke.  Also reports she has been dealing with left knee pain since June as well and is causing her a lot of problems.  Seen by RODNEY Infante in ortho and recommended she be evaluated by surgeon for possible knee replacement, but reports she never received call regarding an appointment.  After that she had her MI, so she is unsure if she would be cleared for surgery at this point.  Offered to schedule appointment with orthopedic surgeon to discuss, but she declines, wishes to speak with cardiology first.  Lives alone, has some friends, but not a lot of family support.  Not currently working. Discussed possible  referral, but she declines at this time.  Has friends that help her with transportation to appointments when needed for procedures, otherwise drives herself.         Review of Systems   Constitutional: Negative.    HENT: Negative.     Eyes: Negative.    Respiratory:  Positive for " "chest tightness (at times, moves up from epigastric region). Negative for cough, shortness of breath and wheezing.    Cardiovascular: Negative.    Gastrointestinal:  Positive for abdominal pain (epigastric). Negative for constipation, diarrhea, nausea and vomiting.   Genitourinary:  Negative for dysuria, flank pain, frequency and urgency.   Musculoskeletal:  Positive for arthralgias (chronic left knee pain).   Skin: Negative.    Neurological:  Negative for dizziness, light-headedness and headaches.   Psychiatric/Behavioral:  Positive for sleep disturbance. The patient is nervous/anxious (regarding health problems/her heart).         +stress        Objective   Vital Signs:   /84 (BP Location: Right arm, Patient Position: Sitting, Cuff Size: Pediatric)   Pulse 77   Temp 97.9 °F (36.6 °C) (Oral)   Ht 160 cm (63\")   Wt 61.2 kg (135 lb)   SpO2 98%   BMI 23.91 kg/m²       Physical Exam  Vitals and nursing note reviewed.   Constitutional:       General: She is not in acute distress.     Appearance: She is not ill-appearing.   HENT:      Head: Normocephalic and atraumatic.   Cardiovascular:      Rate and Rhythm: Normal rate and regular rhythm.   Pulmonary:      Effort: Pulmonary effort is normal. No respiratory distress.      Breath sounds: No wheezing, rhonchi or rales.      Comments: Slightly diminished, but clear  Abdominal:      Palpations: Abdomen is soft.      Tenderness: There is abdominal tenderness (epigastric). There is no right CVA tenderness, left CVA tenderness, guarding or rebound.   Musculoskeletal:         General: Swelling (left knee) and tenderness (left knee) present.      Cervical back: Neck supple.      Left knee: Swelling and crepitus present. No erythema or ecchymosis. Normal range of motion. Tenderness present.      Right lower leg: No edema.      Left lower leg: No edema.   Skin:     General: Skin is warm and dry.   Neurological:      General: No focal deficit present.      Mental " Status: She is alert and oriented to person, place, and time.   Psychiatric:         Attention and Perception: Attention normal.         Mood and Affect: Mood is anxious.         Speech: Speech normal.         Behavior: Behavior is cooperative.         Thought Content: Thought content normal.         Cognition and Memory: Cognition normal.        Result Review :       Recent Results (from the past 4368 hour(s))   CBC (No Diff)    Collection Time: 05/17/23  2:06 PM    Specimen: Blood   Result Value Ref Range    WBC 5.14 3.40 - 10.80 10*3/mm3    RBC 3.84 3.77 - 5.28 10*6/mm3    Hemoglobin 12.3 12.0 - 15.9 g/dL    Hematocrit 36.6 34.0 - 46.6 %    MCV 95.3 79.0 - 97.0 fL    MCH 32.0 26.6 - 33.0 pg    MCHC 33.6 31.5 - 35.7 g/dL    RDW 13.1 12.3 - 15.4 %    RDW-SD 45.9 37.0 - 54.0 fl    MPV 10.1 6.0 - 12.0 fL    Platelets 259 140 - 450 10*3/mm3   Comprehensive Metabolic Panel    Collection Time: 05/17/23  2:06 PM    Specimen: Blood   Result Value Ref Range    Glucose 88 65 - 99 mg/dL    BUN 20 8 - 23 mg/dL    Creatinine 0.70 0.57 - 1.00 mg/dL    Sodium 136 136 - 145 mmol/L    Potassium 3.9 3.5 - 5.2 mmol/L    Chloride 103 98 - 107 mmol/L    CO2 24.0 22.0 - 29.0 mmol/L    Calcium 9.4 8.6 - 10.5 mg/dL    Total Protein 7.0 6.0 - 8.5 g/dL    Albumin 4.2 3.5 - 5.2 g/dL    ALT (SGPT) 16 1 - 33 U/L    AST (SGOT) 15 1 - 32 U/L    Alkaline Phosphatase 70 39 - 117 U/L    Total Bilirubin 0.2 0.0 - 1.2 mg/dL    Globulin 2.8 gm/dL    A/G Ratio 1.5 g/dL    BUN/Creatinine Ratio 28.6 (H) 7.0 - 25.0    Anion Gap 9.0 5.0 - 15.0 mmol/L    eGFR 96.7 >60.0 mL/min/1.73   Type & Screen    Collection Time: 05/17/23  2:06 PM    Specimen: Blood   Result Value Ref Range    ABO Type A     RH type Positive     Antibody Screen Negative     T&S Expiration Date 5/20/2023 11:59:59 PM    PREVIOUS HISTORY    Collection Time: 05/17/23  2:06 PM    Specimen: Blood   Result Value Ref Range    Previous History Patient needs ABO/RH on day of surgery.    ECG 12  Lead    Collection Time: 23  2:12 PM   Result Value Ref Range    QT Interval 418 ms    QTC Interval 466 ms   Type & Screen    Collection Time: 23  6:20 AM    Specimen: Blood   Result Value Ref Range    ABO Type A     RH type Positive     Antibody Screen Negative     T&S Expiration Date 2023 11:59:59 PM    PREVIOUS HISTORY    Collection Time: 23  6:20 AM    Specimen: Blood   Result Value Ref Range    Previous History Previous Record on File    TISSUE EXAM, [facility] ([facility])    Collection Time: 23  7:56 AM    Specimen: Large Intestine, Right / Ascending Colon; Tissue   Result Value Ref Range    Reference Lab Report       [facility]  [address] [place] [address]  Phone: [phone] or [phone]  Fax: [phone]  Naga Torres M.D., Medical Director    PATIENT NAME                           LABORATORY NO.  [id number]  JORDIN TAVARES                        [id number]  [id number]                         AGE              SEX  SSN           CLIENT REF #  [facility]           62      1960  F    [id number]   [id number]    [place]                       REQUESTING M.D.     ATTENDING M.D.     COPY TO.  [address] [place] [address] [place], [place] [address]             DATE COLLECTED      DATE RECEIVED      DATE REPORTED  2023    DIAGNOSIS:  COLON RESECTION FOR TUMOR, RIGHT:  Tubular adenomas, multiple  Sessile serrated adenomas, multiple  Vermiform appendix:  Sessile serrated adenoma, distal tip  Mesenteric lymph nodes (4):  Foreign material (tattoo dye)  Negative  for both high grade dysplasia and invasive carcinoma  Margins of excision free of neoplasia and dysplasia    JBS/pah    CLINICAL HISTORY:  Tubular adenoma of colon    SPECIMENS RECEIVED:  COLON RESECTION FOR TUMOR, RIGHT    MICROSCOPIC DESCRIPTION:  Tissue blocks are prepared and slides  "are examined microscopically on all  specimens. See diagnosis for details.    Professional interpretation rendered by Luis Saunders M.D. at [email or web address],  [facility], [address] [place] [address].    GROSS DESCRIPTION:  Received in formalin labeled \"right colon for adenomatous polyp\", is a right  hemicolectomy is received previously opened and has a minimal amount of  adherent adipose.  The specimen has the following measurements: Ascending  colon-17.1 cm in length by 6.3 cm in average open circumference, terminal  ileum-3.2 cm in length by 2.4 cm in diameter and appendix-8.2 cm in length by  0.8 cm in diameter.  The serosa is pink-tan, smooth, intact and significant for  focal black tattoo ink.   Opening the colon reveals scattered sessile polyps that  range from 0.1-1.5 cm.  There is a polyp located within 4.2 cm from the  proximal terminal ileal margin and a polyp located within 1.5 cm from the distal  colonic margin.  The serosa overlying the largest polyp is inked blue.  Sectioning  reveals the previously mentioned largest polyp having pink-tan, minimally  friable cut surfaces with a fibrovascular core and is located 0.5 cm from the blue  inked serosa.  These polyps are grossly confined to the mucosal surface. The  remaining mucosa is white-tan, normally folded and significant for focal black  tattoo ink.  The bowel wall thickness measures up to 0.2 cm.    The serosa overlying the appendix is inked orange and sectioning through the  appendix reveals pink-tan, edematous mucosa.  The appendiceal lumen measures  up to 0.4 cm and contains an abundant amount of clear, mucoid material.  No  fecaliths are identified.  The appendiceal wall thickness measures up to 0.1  cm.    Sectioning through the mesocolonic adipose reveals 4 candidate nodes ranging  from 0.3-1.3 cm.  Sectioning of the largest nodes reveals white-gray,  homogenous and glistening cut surfaces.  Representative sections are submitted  as " follows:    A1: Proximal terminal ileum margin, en face  A2: Distal colonic margin, en face  A3: Entire largest polyp  A4-A7: Remaining area of black tattoo ink  A8-A10: Remaining polyps  A11: Bisected appendiceal distal tip  A 12-A15: Appendix from distal to proximal  A16: Appendiceal orifice  A17: Largest candidate node, trisected  A18: 3 intact candidate nodes  MLA    REVIEWED, DIAGNOSED AND ELECTRONICALLY  SIGNED BY:    Luis Saunders M.D.  CPT CODES:  40475     Basic Metabolic Panel    Collection Time: 06/02/23  5:28 AM    Specimen: Blood   Result Value Ref Range    Glucose 116 (H) 65 - 99 mg/dL    BUN 6 (L) 8 - 23 mg/dL    Creatinine 0.53 (L) 0.57 - 1.00 mg/dL    Sodium 137 136 - 145 mmol/L    Potassium 3.7 3.5 - 5.2 mmol/L    Chloride 104 98 - 107 mmol/L    CO2 23.0 22.0 - 29.0 mmol/L    Calcium 9.0 8.6 - 10.5 mg/dL    BUN/Creatinine Ratio 11.3 7.0 - 25.0    Anion Gap 10.0 5.0 - 15.0 mmol/L    eGFR 103.4 >60.0 mL/min/1.73   CBC Auto Differential    Collection Time: 06/02/23  5:28 AM    Specimen: Blood   Result Value Ref Range    WBC 11.74 (H) 3.40 - 10.80 10*3/mm3    RBC 3.25 (L) 3.77 - 5.28 10*6/mm3    Hemoglobin 10.5 (L) 12.0 - 15.9 g/dL    Hematocrit 30.4 (L) 34.0 - 46.6 %    MCV 93.5 79.0 - 97.0 fL    MCH 32.3 26.6 - 33.0 pg    MCHC 34.5 31.5 - 35.7 g/dL    RDW 13.2 12.3 - 15.4 %    RDW-SD 45.4 37.0 - 54.0 fl    MPV 9.6 6.0 - 12.0 fL    Platelets 190 140 - 450 10*3/mm3    Neutrophil % 75.8 42.7 - 76.0 %    Lymphocyte % 17.0 (L) 19.6 - 45.3 %    Monocyte % 6.6 5.0 - 12.0 %    Eosinophil % 0.0 (L) 0.3 - 6.2 %    Basophil % 0.3 0.0 - 1.5 %    Immature Grans % 0.3 0.0 - 0.5 %    Neutrophils, Absolute 8.90 (H) 1.70 - 7.00 10*3/mm3    Lymphocytes, Absolute 2.00 0.70 - 3.10 10*3/mm3    Monocytes, Absolute 0.77 0.10 - 0.90 10*3/mm3    Eosinophils, Absolute 0.00 0.00 - 0.40 10*3/mm3    Basophils, Absolute 0.03 0.00 - 0.20 10*3/mm3    Immature Grans, Absolute 0.04 0.00 - 0.05 10*3/mm3    nRBC 0.0 0.0 - 0.2 /100  WBC   High Sensitivity Troponin T    Collection Time: 08/17/23  3:38 PM    Specimen: Blood   Result Value Ref Range    HS Troponin T 1,828 (C) <10 ng/L   Magnesium    Collection Time: 08/17/23  3:38 PM    Specimen: Blood   Result Value Ref Range    Magnesium 2.4 1.6 - 2.4 mg/dL   Comprehensive Metabolic Panel    Collection Time: 08/17/23  3:38 PM    Specimen: Blood   Result Value Ref Range    Glucose 106 (H) 65 - 99 mg/dL    BUN 11 8 - 23 mg/dL    Creatinine 0.53 (L) 0.57 - 1.00 mg/dL    Sodium 132 (L) 136 - 145 mmol/L    Potassium 4.3 3.5 - 5.2 mmol/L    Chloride 100 98 - 107 mmol/L    CO2 19.0 (L) 22.0 - 29.0 mmol/L    Calcium 9.1 8.6 - 10.5 mg/dL    Total Protein 7.7 6.0 - 8.5 g/dL    Albumin 3.9 3.5 - 5.2 g/dL    ALT (SGPT) 20 1 - 33 U/L    AST (SGOT) 68 (H) 1 - 32 U/L    Alkaline Phosphatase 81 39 - 117 U/L    Total Bilirubin 0.3 0.0 - 1.2 mg/dL    Globulin 3.8 gm/dL    A/G Ratio 1.0 g/dL    BUN/Creatinine Ratio 20.8 7.0 - 25.0    Anion Gap 13.0 5.0 - 15.0 mmol/L    eGFR 103.4 >60.0 mL/min/1.73   CBC Auto Differential    Collection Time: 08/17/23  3:38 PM    Specimen: Blood   Result Value Ref Range    WBC 10.51 3.40 - 10.80 10*3/mm3    RBC 3.86 3.77 - 5.28 10*6/mm3    Hemoglobin 12.3 12.0 - 15.9 g/dL    Hematocrit 36.9 34.0 - 46.6 %    MCV 95.6 79.0 - 97.0 fL    MCH 31.9 26.6 - 33.0 pg    MCHC 33.3 31.5 - 35.7 g/dL    RDW 13.5 12.3 - 15.4 %    RDW-SD 47.5 37.0 - 54.0 fl    MPV 9.1 6.0 - 12.0 fL    Platelets 317 140 - 450 10*3/mm3    Neutrophil % 88.9 (H) 42.7 - 76.0 %    Lymphocyte % 8.9 (L) 19.6 - 45.3 %    Monocyte % 1.4 (L) 5.0 - 12.0 %    Eosinophil % 0.0 (L) 0.3 - 6.2 %    Basophil % 0.3 0.0 - 1.5 %    Immature Grans % 0.5 0.0 - 0.5 %    Neutrophils, Absolute 9.34 (H) 1.70 - 7.00 10*3/mm3    Lymphocytes, Absolute 0.94 0.70 - 3.10 10*3/mm3    Monocytes, Absolute 0.15 0.10 - 0.90 10*3/mm3    Eosinophils, Absolute 0.00 0.00 - 0.40 10*3/mm3    Basophils, Absolute 0.03 0.00 - 0.20 10*3/mm3    Immature Grans,  Absolute 0.05 0.00 - 0.05 10*3/mm3    nRBC 0.0 0.0 - 0.2 /100 WBC   ECG 12 Lead Other; Post PCI    Collection Time: 08/17/23  3:55 PM   Result Value Ref Range    QT Interval 362 ms    QTC Interval 438 ms   High Sensitivity Troponin T 2Hr    Collection Time: 08/17/23  5:39 PM    Specimen: Blood   Result Value Ref Range    HS Troponin T 1,811 (C) <10 ng/L    Troponin T Delta -17 (L) >=-4 - <+4 ng/L   CBC (No Diff)    Collection Time: 08/18/23  5:55 AM    Specimen: Blood   Result Value Ref Range    WBC 9.12 3.40 - 10.80 10*3/mm3    RBC 3.62 (L) 3.77 - 5.28 10*6/mm3    Hemoglobin 11.6 (L) 12.0 - 15.9 g/dL    Hematocrit 33.8 (L) 34.0 - 46.6 %    MCV 93.4 79.0 - 97.0 fL    MCH 32.0 26.6 - 33.0 pg    MCHC 34.3 31.5 - 35.7 g/dL    RDW 13.7 12.3 - 15.4 %    RDW-SD 46.8 37.0 - 54.0 fl    MPV 9.2 6.0 - 12.0 fL    Platelets 351 140 - 450 10*3/mm3   Basic Metabolic Panel    Collection Time: 08/18/23  5:55 AM    Specimen: Blood   Result Value Ref Range    Glucose 118 (H) 65 - 99 mg/dL    BUN 16 8 - 23 mg/dL    Creatinine 0.77 0.57 - 1.00 mg/dL    Sodium 141 136 - 145 mmol/L    Potassium 4.1 3.5 - 5.2 mmol/L    Chloride 106 98 - 107 mmol/L    CO2 22.0 22.0 - 29.0 mmol/L    Calcium 9.5 8.6 - 10.5 mg/dL    BUN/Creatinine Ratio 20.8 7.0 - 25.0    Anion Gap 13.0 5.0 - 15.0 mmol/L    eGFR 86.3 >60.0 mL/min/1.73   Hemoglobin A1c    Collection Time: 08/18/23  5:55 AM    Specimen: Blood   Result Value Ref Range    Hemoglobin A1C 5.60 4.80 - 5.60 %   Lipid Panel    Collection Time: 08/18/23  5:55 AM    Specimen: Blood   Result Value Ref Range    Total Cholesterol 201 (H) 0 - 200 mg/dL    Triglycerides 154 (H) 0 - 150 mg/dL    HDL Cholesterol 50 40 - 60 mg/dL    LDL Cholesterol  124 (H) 0 - 100 mg/dL    VLDL Cholesterol 27 5 - 40 mg/dL    LDL/HDL Ratio 2.40    High Sensitivity Troponin T    Collection Time: 08/18/23  5:55 AM    Specimen: Blood   Result Value Ref Range    HS Troponin T 1,476 (C) <10 ng/L   Adult Transthoracic Echo Complete  w/ Color, Spectral and Contrast if Necessary Per Protocol    Collection Time: 08/18/23 10:36 AM   Result Value Ref Range    EF(MOD-bp) 46.6 %    LV GLOBAL STRAIN  -16.0 %    LVIDd 4.7 cm    LVIDs 2.7 cm    IVSd 0.89 cm    LVPWd 1.11 cm    FS 43.5 %    IVS/LVPW 0.80 cm    ESV(cubed) 19.1 ml    LV Sys Vol (BSA corrected) 23.4 cm2    EDV(cubed) 105.8 ml    LV Buchanan Vol (BSA corrected) 40.5 cm2    LVOT area 3.7 cm2    LV mass(C)d 165.5 grams    LVOT diam 2.16 cm    EDV(MOD-sp2) 79.2 ml    EDV(MOD-sp4) 65.9 ml    ESV(MOD-sp2) 38.3 ml    ESV(MOD-sp4) 38.1 ml    SV(MOD-sp2) 40.9 ml    SV(MOD-sp4) 27.8 ml    SI(MOD-sp2) 25.2 ml/m2    SI(MOD-sp4) 17.1 ml/m2    EF(MOD-sp2) 51.6 %    EF(MOD-sp4) 42.2 %    MV E max chuck 82.7 cm/sec    MV A max chuck 82.7 cm/sec    MV dec time 0.20 msec    MV E/A 1.00     LA ESV Index (BP) 28.7 ml/m2    Med Peak E' Chuck 9.2 cm/sec    Lat Peak E' Chuck 11.4 cm/sec    Avg E/e' ratio 8.03     SV(LVOT) 74.0 ml    RVIDd 2.14 cm    TAPSE (>1.6) 2.22 cm    LA dimension (2D)  3.0 cm    LV V1 max 84.3 cm/sec    LV V1 max PG 2.8 mmHg    LV V1 mean PG 1.27 mmHg    LV V1 VTI 20.2 cm    Ao pk chuck 141.0 cm/sec    Ao max PG 8.0 mmHg    Ao mean PG 4.0 mmHg    Ao V2 VTI 32.2 cm    LEON(I,D) 2.30 cm2    MV max PG 4.5 mmHg    MV mean PG 1.27 mmHg    MV V2 VTI 30.0 cm    MV P1/2t 82.5 msec    MVA(P1/2t) 2.7 cm2    MVA(VTI) 2.47 cm2    MV dec slope 369.0 cm/sec2    MR max chuck 325.8 cm/sec    MR max PG 42.5 mmHg    TR max chuck 205 cm/sec    TR max PG 17 mmHg    RVSP(TR) 25 mmHg    RAP systole 8 mmHg    PA V2 max 67.8 cm/sec    Ao root diam 3.5 cm    ACS 1.89 cm    Echo EF Estimated 63.0 %   ECG 12 Lead Rhythm Change    Collection Time: 08/18/23 12:54 PM   Result Value Ref Range    QT Interval 488 ms    QTC Interval 449 ms   ECG 12 Lead    Collection Time: 09/13/23  2:19 PM   Result Value Ref Range    QT Interval 434 ms    QTC Interval 444 ms   POCT hemoglobin    Collection Time: 09/13/23  2:42 PM    Specimen: Blood   Result  Value Ref Range    Hemoglobin 11.8 (A) 12.0 - 17.0 g/dL    Lot Number [id number]     Expiration Date 02/26/2024      Recent Results (from the past 24 hour(s))   ECG 12 Lead    Collection Time: 09/13/23  2:19 PM   Result Value Ref Range    QT Interval 434 ms    QTC Interval 444 ms   POCT hemoglobin    Collection Time: 09/13/23  2:42 PM    Specimen: Blood   Result Value Ref Range    Hemoglobin 11.8 (A) 12.0 - 17.0 g/dL    Lot Number [id number]     Expiration Date 02/26/2024        EKG: NSR with nonspecific ST abnormality.  Official cardiology read pending.     XR Chest 1 View    Result Date: 8/17/2023  1.  No acute intrathoracic process.     Chest X-Ray PA & Lateral    Result Date: 5/17/2023  No significant abnormality of the chest.         FINDINGS:  No fracture or malalignment.  There does appear to be remote posttraumatic  change involving the  proximal lateral left tibia.     There is moderate tricompartmental degenerative joint disease of the right knee  most pronounced in the medial compartment.     There is severe tricompartmental degenerative joint disease of the left knee  most pronounced in the medial compartment.   Electronically marked as preliminary by Ky Astudillo MD on 7/3/23 at 1114 CDT Electronically signed by Ky Astudillo MD on 7/3/23 at 1240 CDT     Assessment and Plan    Diagnoses and all orders for this visit:    1. Epigastric pain (Primary)  -     ECG 12 Lead  -     POCT hemoglobin  -     sucralfate (Carafate) 1 g tablet; Take 1 tablet by mouth 4 (Four) Times a Day for 14 days.  Dispense: 56 tablet; Refill: 0    2. Chest tightness  -     ECG 12 Lead    3. Coronary artery disease involving native coronary artery of native heart, unspecified whether angina present    4. Anxiety about health    5. Chronic pain of left knee    Currently denies chest pain/dyspnea/jaw pain/nausea, VSS and in no distress.  Has upcoming appt with cardiology scheduled and encouraged to keep appointment.  ER if chest  pain, dyspnea, nausea, jaw pain develop.   Continue with current medications as prescribed for CAD/HTN/Hyperlipidemia  Smoking cessation encouraged--has nicoderm patches    Rx for Carafate for epigastric pain as this has been helpful previously--dosing x 2 weeks, call if she needs to continue  Continue with omeprazole daily   Hemoglobin stable    Anxiety/stress about her health.  Discussed relaxation techniques.   Has Trazodone at home, but hasn't taken.    Has been given hydroxyzine in the past as well, but didn't take.     Wishes to wait on seeing orthopedic surgeon to discuss possible knee replacement until after cardiology appointment--will notify us when she is ready for this to be scheduled  Continue with knee braces as needed  Ice/elevation/muscle rubs PRN  Has Tramadol at home for pain, but doesn't take    See PCP for routine f/u visit and management of chronic medical conditions.  See cardiology as scheduled.     This document has been electronically signed by RODNEY Peña on September 13, 2023 18:16 CDT,.           I spent 45 minutes caring for Jo Ann on this date of service. This time includes time spent by me in the following activities:preparing for the visit, reviewing tests, performing a medically appropriate examination and/or evaluation , counseling and educating the patient/family/caregiver, ordering medications, tests, or procedures, and documenting information in the medical record

## 2023-09-14 LAB
QT INTERVAL: 434 MS
QTC INTERVAL: 444 MS

## 2023-09-15 ENCOUNTER — TELEPHONE (OUTPATIENT)
Dept: FAMILY MEDICINE CLINIC | Facility: CLINIC | Age: 63
End: 2023-09-15

## 2023-09-15 NOTE — TELEPHONE ENCOUNTER
Pt was seen in same day clinic and has questions about medication sucralfate. Pt states medication is making her feel nauseous and would like to know if that is a normal reaction. Pt would like a call back [phone]

## 2023-09-15 NOTE — TELEPHONE ENCOUNTER
Nausea is not a common reaction.  It can sometimes cause constipation.  If it is causing worsening symptoms, she can stop taking and try increasing her omeprazole to 40 mg instead.

## 2023-09-15 NOTE — TELEPHONE ENCOUNTER
Patient states she has been doing better with Carafate it was just today she thought it was causing her to be a little nauseated.  She thinks now it was something she ate.  Her Son come in and she seems better.

## 2023-09-25 NOTE — PROGRESS NOTES
Subjective   Jo Ann Lacy is a 62 y.o. female.     History of Present Illness  She presents today for her routine follow-up on chronic medical problems and for a hospital follow-up after recent admission for a heart attack.  She is tolerating her medications for the management of her blood pressure without side effect.  Her blood pressure is well controlled today in the office.  She has maintained her weight since her last office visit.  Her BMI is currently 24.3%.  She did have her colonoscopy performed and subsequently had a colon resection.  She reports that she has recovered well from this.  She continues to follow-up with cardiology as scheduled.  She reports that her insomnia symptoms continue to wax and wane.  She has been seeing orthopedics for her chronic left knee pain.  She does need a refill on her smoking cessation medications.  She is tolerating her vitamin D supplement without side effect.  She is otherwise without any other new complaints today in the office.     The following portions of the patient's history were reviewed and updated as appropriate: allergies, current medications, past family history, past medical history, past social history, past surgical history and problem list.    Review of Systems   Constitutional: Negative.    HENT: Negative.     Eyes: Negative.    Respiratory: Negative.     Cardiovascular: Negative.    Gastrointestinal: Negative.    Musculoskeletal:  Positive for arthralgias and myalgias.   Skin: Negative.    Allergic/Immunologic: Negative.    Neurological: Negative.    Hematological: Negative.    Psychiatric/Behavioral: Negative.       Objective   Physical Exam  Vitals reviewed.   Constitutional:       General: She is not in acute distress.     Appearance: She is well-developed. She is not diaphoretic.   HENT:      Head: Normocephalic.      Right Ear: External ear normal.      Left Ear: External ear normal.      Nose: Nose normal.   Eyes:      Pupils: Pupils are equal,  round, and reactive to light.   Neck:      Thyroid: No thyromegaly.      Vascular: No JVD.   Cardiovascular:      Rate and Rhythm: Normal rate and regular rhythm.      Heart sounds: No murmur heard.    No friction rub. No gallop.   Pulmonary:      Effort: Pulmonary effort is normal. No respiratory distress.      Breath sounds: Normal breath sounds. No wheezing or rales.   Musculoskeletal:         General: Normal range of motion.      Cervical back: Normal range of motion and neck supple.   Skin:     General: Skin is warm and dry.      Coloration: Skin is not pale.      Findings: No erythema or rash.   Neurological:      Mental Status: She is alert and oriented to person, place, and time.   Psychiatric:         Behavior: Behavior normal.         Thought Content: Thought content normal.         Judgment: Judgment normal.         Assessment & Plan   Diagnoses and all orders for this visit:    1. Coronary artery disease involving native coronary artery of native heart, unspecified whether angina present (Primary)  -     aspirin 81 MG EC tablet; Take 1 tablet by mouth Daily.  Dispense: 90 tablet; Refill: 1  -     lisinopril (PRINIVIL,ZESTRIL) 5 MG tablet; Take 1 tablet by mouth Daily.  Dispense: 90 tablet; Refill: 1  -     metoprolol tartrate (LOPRESSOR) 25 MG tablet; Take 1 tablet by mouth Every 12 (Twelve) Hours.  Dispense: 180 tablet; Refill: 1  -     ticagrelor (BRILINTA) 90 MG tablet tablet; Take 1 tablet by mouth 2 (Two) Times a Day.  Dispense: 180 tablet; Refill: 1    2. Encounter for screening mammogram for breast cancer  -     Mammo Screening Bilateral With CAD; Future    3. Screening for osteoporosis  -     DEXA Bone Density Axial; Future    4. Postmenopausal  -     DEXA Bone Density Axial; Future    5. Essential hypertension    6. Dyslipidemia    7. Vitamin D deficiency    Other orders  -     nicotine (NICODERM CQ) 21 MG/24HR patch; Place 1 patch on the skin as directed by provider Daily. Apply Patch to Clean,  "Dry, Hairless Area Daily Remove Old Patch Before Applying New Patch Rotate Patch Site Daily May Remove Patch at Bedtime to Prevent Insomnia Follow Other Instructions on Package  Dispense: 28 patch; Refill: 0  -     nicotine polacrilex (NICORETTE) 2 MG gum; Chew 1 each Every 1 (One) Hour As Needed for Smoking Cessation. Maximum 24 Pieces in 24 hours. Gum Should be Chewed Until it Tingles, Then \"Park\" Between Gums & Cheek.  When Tingling is Gone, Chew Again Until Tingling Returns & Then \"Park\" Between Gums & Cheek Repeat Until Tingling is No Longer Experienced & Then Discard Follow Other Instructions on Package (Patient not taking: Reported on 9/13/2023)  Dispense: 220 each; Refill: 0               BMI is within normal parameters. No other follow-up for BMI required.    Schedule for screening mammogram and bone density.  Continue current medications.  Follow up as scheduled for routine follow up.  Follow up sooner for problems/concerns.  Patient verbalized understanding and agreement with plan of care.        This document has been electronically signed by Spring Hollins DNP, APRN on September 24, 2023 22:00 CDT      "

## 2023-11-18 ENCOUNTER — NURSE TRIAGE (OUTPATIENT)
Dept: CALL CENTER | Facility: HOSPITAL | Age: 63
End: 2023-11-18
Payer: MEDICAID

## 2023-11-19 NOTE — TELEPHONE ENCOUNTER
Lilly missed her morning medications ans she wants to know if she should take them all right now or wait until the morning. She also wants to know how to reschedule her heart surgery with Dr Spivey.

## 2023-12-29 ENCOUNTER — NURSE TRIAGE (OUTPATIENT)
Dept: CALL CENTER | Facility: HOSPITAL | Age: 63
End: 2023-12-29
Payer: MEDICAID

## 2023-12-29 NOTE — TELEPHONE ENCOUNTER
Right foot is painful water got into her shoe. On ankle and top part of foot is sharp pain/    Has a fractured left knee. Was given a knee immoblizer.  Had a heart attack in August    Care advice per giudeline.  Reason for Disposition   Foot pain    Additional Information   Negative: Followed a foot injury   Negative: Diabetes mellitus   Negative: Toe pain is main symptom   Negative: Ankle pain is main symptom   Negative: Thigh or calf pain is main symptom   Negative: Entire foot is cool or blue in comparison to other foot   Negative: Purple or black skin on foot or toe   Negative: [1] Red area or streak AND [2] fever   Negative: [1] Swollen foot AND [2] fever   Negative: Patient sounds very sick or weak to the triager   Negative: [1] SEVERE pain (e.g., excruciating, unable to do any normal activities) AND [2] not improved after 2 hours of pain medicine   Negative: [1] Looks infected (spreading redness, pus) AND [2] large red area (> 2 in. or 5 cm)   Negative: Looks like a boil, infected sore, or deep ulcer   Negative: [1] Redness of the skin AND [2] no fever   Negative: [1] Swollen foot AND [2] no fever  (Exceptions: localized bump from bunions, calluses, insect bite, sting)   Negative: Weakness (i.e., loss of strength) of new-onset in foot or toes  (Exceptions: not truly weak, foot feels weak because of pain; weakness present > 2 weeks)   Negative: Numbness (i.e., loss of sensation) in foot or toes  (Exception: Just tingling; numbness present > 2 weeks.)   Negative: [1] MODERATE pain (e.g., interferes with normal activities, limping) AND [2] present > 3 days   Negative: [1] Weakness or numbness in foot or toes AND [2] present > 2 weeks   Negative: [1] Tingling in feet AND [2] new or increased   Negative: Pain in the big toe joint   Negative: [1] MILD pain (e.g., does not interfere with normal activities) AND [2] present > 7 days   Negative: Foot pain is a chronic symptom (recurrent or ongoing AND present > 4  "weeks)   Negative: Caused by known bunions, plantar wart, or flat feet    Answer Assessment - Initial Assessment Questions  1. ONSET: \"When did the pain start?\"       3pm  2. LOCATION: \"Where is the pain located?\"   Right foot  3. PAIN: \"How bad is the pain?\"    (Scale 1-10; or mild, moderate, severe)   - MILD (1-3): doesn't interfere with normal activities.    - MODERATE (4-7): interferes with normal activities (e.g., work or school) or awakens from sleep, limping.    - SEVERE (8-10): excruciating pain, unable to do any normal activities, unable to walk.      8/10 constant pain  Can walk on it but it hurts  4. WORK OR EXERCISE: \"Has there been any recent work or exercise that involved this part of the body?\"      Was walking outside today and her foot got wet  5. CAUSE: \"What do you think is causing the foot pain?\"     Not sure  6. OTHER SYMPTOMS: \"Do you have any other symptoms?\" (e.g., leg pain, rash, fever, numbness)   No discoloration. Foot is not cold to touch it is warm  7. PREGNANCY: \"Is there any chance you are pregnant?\" \"When was your last menstrual period?\"  na    Protocols used: Foot Pain-ADULT-AH    "

## 2024-08-21 ENCOUNTER — NURSE TRIAGE (OUTPATIENT)
Dept: CALL CENTER | Facility: HOSPITAL | Age: 64
End: 2024-08-21
Payer: MEDICAID

## 2024-08-22 NOTE — TELEPHONE ENCOUNTER
Reason for Disposition   Caller has medicine question only, adult not sick, AND triager answers question    Additional Information   Negative: [1] Intentional drug overdose AND [2] suicidal thoughts or ideas   Negative: Drug overdose and triager unable to answer question   Negative: Caller requesting a renewal or refill of a medicine patient is currently taking   Negative: Caller requesting information unrelated to medicine   Negative: Caller requesting information about COVID-19 Vaccine   Negative: Caller requesting information about Emergency Contraception   Negative: Caller requesting information about Combined Birth Control Pills   Negative: Caller requesting information about Progestin Birth Control Pills   Negative: Caller requesting information about Post-Op pain or medicines   Negative: Caller requesting a prescription antibiotic (such as Penicillin) for Strep throat and has a positive culture result   Negative: Caller requesting a prescription anti-viral med (such as Tamiflu) and has influenza (flu) symptoms   Negative: Immunization reaction suspected   Negative: Rash while taking a medicine or within 3 days of stopping it   Negative: [1] Asthma and [2] having symptoms of asthma (cough, wheezing, etc.)   Negative: [1] Symptom of illness (e.g., headache, abdominal pain, earache, vomiting) AND [2] more than mild   Negative: Breastfeeding questions about mother's medicines and diet   Negative: MORE THAN A DOUBLE DOSE of a prescription or over-the-counter (OTC) drug   Negative: [1] DOUBLE DOSE (an extra dose or lesser amount) of prescription drug AND [2] any symptoms (e.g., dizziness, nausea, pain, sleepiness)   Negative: [1] DOUBLE DOSE (an extra dose or lesser amount) of over-the-counter (OTC) drug AND [2] any symptoms (e.g., dizziness, nausea, pain, sleepiness)   Negative: Took another person's prescription drug   Negative: [1] DOUBLE DOSE (an extra dose or lesser amount) of prescription drug AND [2] NO  "symptoms  (Exception: A double dose of antibiotics.)   Negative: Diabetes drug error or overdose (e.g., took wrong type of insulin or took extra dose)   Negative: [1] Prescription not at pharmacy AND [2] was prescribed by PCP recently (Exception: Triager has access to EMR and prescription is recorded there. Go to Home Care and confirm for pharmacy.)   Negative: [1] Pharmacy calling with prescription question AND [2] triager unable to answer question   Negative: [1] Caller has URGENT medicine question about med that PCP or specialist prescribed AND [2] triager unable to answer question   Negative: Medicine patch causing local rash or itching   Negative: [1] Caller has medicine question about med NOT prescribed by PCP AND [2] triager unable to answer question (e.g., compatibility with other med, storage)   Negative: Prescription request for new medicine (not a refill)   Negative: [1] Caller has NON-URGENT medicine question about med that PCP prescribed AND [2] triager unable to answer question   Negative: Caller wants to use a complementary or alternative medicine   Negative: [1] Prescription prescribed recently is not at pharmacy AND [2] triager has access to patient's EMR AND [3] prescription is recorded in the EMR   Negative: [1] DOUBLE DOSE (an extra dose or lesser amount) of over-the-counter (OTC) drug AND [2] NO symptoms   Negative: [1] DOUBLE DOSE (an extra dose or lesser amount) of antibiotic drug AND [2] NO symptoms    Answer Assessment - Initial Assessment Questions  1. NAME of MEDICINE: \"What medicine(s) are you calling about?\"      Metoprolol  2. QUESTION: \"What is your question?\" (e.g., double dose of medicine, side effect)      Medication was increased from 25 to 50mg and patient did not get medicine until this evening. Wants to know if she should take medication now or wait and take in AM with normal routine. Advised to take tomorrow morning.   3. PRESCRIBER: \"Who prescribed the medicine?\" Reason: if " "prescribed by specialist, call should be referred to that group.      Spring Hollins  4. SYMPTOMS: \"Do you have any symptoms?\" If Yes, ask: \"What symptoms are you having?\"  \"How bad are the symptoms (e.g., mild, moderate, severe)      No  5. PREGNANCY:  \"Is there any chance that you are pregnant?\" \"When was your last menstrual period?\"      Na    Protocols used: Medication Question Call-ADULT-    "

## 2024-11-27 NOTE — CONSULTS
Adult Nutrition  Assessment/PES    Patient Name:  Jo Ann Lacy  YOB: 1960  MRN: [id number]  Admit Date:  8/17/2023    Assessment Date:  8/18/2023    Comments:  61yo female admit w/ CP and STEMI- s/p TR band and stent. Cardiac diet, no intakes available. # w. BMI 23.5. Pt states nkfa, no c/s problems. Good appetite PTA. No weight concerns. Discussed cardiac HH diet w/ pt r/t elev triglycerides and cholesterol levels. Will attach diet info to print w/ d/c papers. RD to follow hospital course.     Reason for Assessment       Row Name 08/18/23 1015          Reason for Assessment    Reason For Assessment identified at risk by screening criteria     Diagnosis cardiac disease     Identified At Risk by Screening Criteria MST SCORE 2+                    Nutrition/Diet History       Row Name 08/18/23 1015          Nutrition/Diet History    Typical Intake (Food/Fluid/EN/PN) Pt states nkfa, no c/s problems. Good appetite PTA. No weight concerns.     Food Preferences NO melon, fresh oranges                    Labs/Tests/Procedures/Meds       Row Name 08/18/23 1016          Labs/Procedures/Meds    Lab Results Reviewed reviewed     Lab Results Comments Glu 118, alb 3.9, elev chol/triglycerides        Diagnostic Tests/Procedures    Diagnostic Test/Procedure Reviewed reviewed     Diagnostic Test/Procedures Comments s/p TR band and stent        Medications    Pertinent Medications Reviewed reviewed     Pertinent Medications Comments IV solumedrol Qid                      Estimated/Assessed Needs - Anthropometrics       Row Name 08/18/23 1017 08/18/23 0600       Anthropometrics    Weight -- 60.3 kg (133 lb)    Weight for Calculation 60.3 kg (133 lb) --       Estimated/Assessed Needs    Additional Documentation Fluid Requirements (Group);Estimated Calorie Needs (Group);KCAL/KG (Group);Protein Requirements (Group) --       Estimated Calorie Needs    Estimated Calorie Requirement (kcal/day) 1600 --       KCAL/KG     KCAL/KG 25 Kcal/Kg (kcal) --    25 Kcal/Kg (kcal) 1508.2 --       Protein Requirements    Weight Used For Protein Calculations 60.3 kg (133 lb) --    Est Protein Requirement Amount (gms/kg) 0.8 gm protein --    Estimated Protein Requirements (gms/day) 48.26 --       Fluid Requirements    Fluid Requirements (mL/day) 1500 --    RDA Method (mL) 1500 --                   Nutrition Prescription Ordered       Row Name 08/18/23 1017          Nutrition Prescription PO    Current PO Diet Regular     Common Modifiers Cardiac                         Problem/Interventions:   Problem 1       Row Name 08/18/23 1017          Nutrition Diagnoses Problem 1    Problem 1 Knowledge Deficit     Etiology (related to) Medical Diagnosis     Cardiac CAD;Stent     Signs/Symptoms (evidenced by) Potential Information Deficit                          Intervention Goal       Row Name 08/18/23 1018          Intervention Goal    General Maintain nutrition;Provide information regarding MNT for treatment/condition;Reduce/improve symptoms;Meet nutritional needs for age/condition     PO Establish PO;Meet estimated needs     Weight Maintain weight                    Nutrition Intervention       Row Name 08/18/23 1018          Nutrition Intervention    RD/Tech Action Interview for preference;Encourage intake;Care plan reviewd;Follow Tx progress;Menu adjusted                      Education/Evaluation       Row Name 08/18/23 1018          Education    Education Education topics;Advised regarding habits/behavior     Education Topics Cardiac heart health     Advised Regarding Habits/Behavior Food choices;Label reading;Seasoning food;Eating out        Monitor/Evaluation    Monitor PO intake;Pertinent labs;Weight;Symptoms     Education Follow-up Reinforce PRN                     Electronically signed by:  Shreya Calix RD  08/18/23 10:18 CDT   negative alert and oriented x3

## 2025-05-30 ENCOUNTER — NURSE TRIAGE (OUTPATIENT)
Dept: CALL CENTER | Facility: HOSPITAL | Age: 65
End: 2025-05-30

## 2025-05-31 NOTE — TELEPHONE ENCOUNTER
Reason for Disposition   [1] Taking antibiotic > 72 hours (3 days) for UTI AND [2] painful urination or frequency is SAME (unchanged, not better)    Additional Information   Negative: Shock suspected (e.g., cold/pale/clammy skin, too weak to stand, low BP, rapid pulse)   Negative: Sounds like a life-threatening emergency to the triager   Negative: Followed a female genital area injury (e.g., labia, vagina, vulva)   Negative: Followed a male genital area injury (e.g., penis, scrotum)   Negative: Vaginal discharge   Negative: Pus (white, yellow) or bloody discharge from end of penis   [1] Taking antibiotic for urinary tract infection (UTI) AND [2] female   Negative: Shock suspected (e.g., cold/pale/clammy skin, too weak to stand, low BP, rapid pulse)   Negative: Sounds like a life-threatening emergency to the triager   Negative: Urinary tract infection suspected, but not taking antibiotics   Negative: [1] Unable to urinate (or only a few drops) > 4 hours AND [2] bladder feels very full (e.g., palpable bladder or strong urge to urinate)   Negative: Passing pure blood or large blood clots (i.e., size > a dime)  (Exceptions: Flecks, small strands, or pinkish-red color)   Negative: Fever > 103 F (39.4 C)   Negative: Patient sounds very sick or weak to the triager   Negative: [1] SEVERE pain (e.g., excruciating) AND [2] no improvement 2 hours after pain medications   Negative: [1] Fever > 100.0 F (37.8 C) AND [2] new-onset since starting antibiotics   Negative: [1] Side (flank) or lower back pain AND [2] new-onset since starting antibiotics   Negative: [1] Taking antibiotic > 24 hours for UTI AND [2] flank or lower back pain getting WORSE   Negative: [1] Vomiting 2 or more times AND [2] interferes with taking oral antibiotic   Negative: [1] Taking antibiotic > 24 hours for UTI (urinary tract or bladder infection) AND [2] fever persists (still has fever)    Answer Assessment - Initial Assessment Questions  1. SYMPTOM:  "\"What's the main symptom you're concerned about?\" (e.g., frequency, incontinence)      Urine is foamy, taking abx, wanting to know if abx is causing it  2. ONSET: \"When did the  this  start?\"      Started in March, but past few days been worse  3. PAIN: \"Is there any pain?\" If Yes, ask: \"How bad is it?\" (Scale: 1-10; mild, moderate, severe)      denied  4. CAUSE: \"What do you think is causing the symptoms?\"      Wanting to know fi abx could be causing  5. OTHER SYMPTOMS: \"Do you have any other symptoms?\" (e.g., blood in urine, fever, flank pain, pain with urination)      denied  6. PREGNANCY: \"Is there any chance you are pregnant?\" \"When was your last menstrual period?\"      N/a    Answer Assessment - Initial Assessment Questions  1. MAIN SYMPTOM: \"What is the main symptom you are concerned about?\" (e.g., painful urination, urine frequency)      Urine is foamy  2. BETTER-SAME-WORSE: \"Are you getting better, staying the same, or getting worse compared to how you felt at your last visit to the doctor (most recent medical visit)?\"      Better with pain, but more frequest at night  3. PAIN: \"How bad is the pain?\"  (e.g., Scale 1-10; mild, moderate, or severe)    - MILD (1-3): complains slightly about urination hurting    - MODERATE (4-7): interferes with normal activities      - SEVERE (8-10): excruciating, unwilling or unable to urinate because of the pain       denied  4. FEVER: \"Do you have a fever?\" If Yes, ask: \"What is it, how was it measured, and when did it start?\"      denied  5. OTHER SYMPTOMS: \"Do you have any other symptoms?\" (e.g., blood in the urine, flank pain, vaginal discharge)      N/a  6. DIAGNOSIS: \"When was the UTI diagnosed?\" \"By whom?\" \"Was it a kidney infection, bladder infection or both?\"      [facility], diagnosed on 5/24  7. ANTIBIOTIC: \"What antibiotic(s) are you taking?\" \"How many times per day?\"      levofloxacin  8. ANTIBIOTIC - START DATE: \"When did you start taking the antibiotic?\"      " 5/24    Protocols used: Urinary Symptoms-ADULT-, Urinary Tract Infection on Antibiotic Follow-up Call - Female-ADULT-

## 2025-06-03 ENCOUNTER — NURSE TRIAGE (OUTPATIENT)
Dept: CALL CENTER | Facility: HOSPITAL | Age: 65
End: 2025-06-03

## 2025-06-04 NOTE — TELEPHONE ENCOUNTER
Was seen at clinic on 3/28, states bladder, seen Urologist at [facility], was placed on antibiotics last week, have taken them all but 2 pills, started hurting in legs Friday hurting so bad can't hardly walk, Was to follow up with urologist 6 weeks from when went in past, but does not want to be seen with Urologist with [facility], requesting [facility]   Information given to patient to call PCP in am for further direction and refer her to [facility]

## 2025-06-04 NOTE — TELEPHONE ENCOUNTER
"                    [place] near [facility] area   Reason for Disposition   General information question, no triage required and triager able to answer question    Additional Information   Negative: [1] Caller is not with the adult (patient) AND [2] reporting urgent symptoms   Negative: Lab result questions   Negative: Medication questions   Negative: Caller can't be reached by phone   Negative: Caller has already spoken to PCP or another triager   Negative: RN needs further essential information from caller in order to complete triage   Negative: Requesting regular office appointment   Negative: [1] Caller requesting NON-URGENT health information AND [2] PCP's office is the best resource   Negative: Health Information question, no triage required and triager able to answer question   Negative: Question about upcoming scheduled test, no triage required and triager able to answer question   Negative: [1] Caller is not with the adult (patient) AND [2] probable NON-URGENT symptoms   Negative: [1] Follow-up call to recent contact AND [2] information only call, no triage required    Answer Assessment - Initial Assessment Questions  1. REASON FOR CALL or QUESTION: \"What is your reason for calling today?\" or \"How can I best help you?\" or \"What question do you have that I can help answer?\"   Was seen at clinic on 3/28, states bladder, seen Urologist at [facility], was placed on antibiotics last week, have taken them all but 2 pills, started hurting in legs Friday hurting so bad can't hardly walk, Was to follow up with urologist 6 weeks from when went in past, but does not want to be seen with Urologist with [facility], requesting [facility]    Protocols used: Information Only Call - No Triage-ADULT-    "

## (undated) DEVICE — PATIENT RETURN ELECTRODE, SINGLE-USE, CONTACT QUALITY MONITORING, ADULT, WITH 9FT CORD, FOR PATIENTS WEIGING OVER 33LBS. (15KG): Brand: MEGADYNE

## (undated) DEVICE — PROXIMATE SKIN STAPLERS (35 WIDE) CONTAINS 35 STAINLESS STEEL STAPLES (FIXED HEAD): Brand: PROXIMATE

## (undated) DEVICE — SINGLE USE INJECTOR: Brand: SINGLE USE INJECTOR

## (undated) DEVICE — MONOPOLAR METZENBAUM SCISSOR TIP, DISPOSABLE: Brand: MONOPOLAR METZENBAUM SCISSOR TIP, DISPOSABLE

## (undated) DEVICE — STERILE POLYISOPRENE POWDER-FREE SURGICAL GLOVES WITH EMOLLIENT COATING: Brand: PROTEXIS

## (undated) DEVICE — TREK CORONARY DILATATION CATHETER 2.50 MM X 12 MM / RAPID-EXCHANGE: Brand: TREK

## (undated) DEVICE — MODEL BT2000 P/N 700287-012KIT CONTENTS: MANIFOLD WITH SALINE AND CONTRAST PORTS, SALINE TUBING WITH SPIKE AND HAND SYRINGE, TRANSDUCER: Brand: BT2000 AUTOMATED MANIFOLD KIT

## (undated) DEVICE — SUT VIC 2/0 TIES 18IN J111T

## (undated) DEVICE — CVR PROB ULTRASND INTUIT RL 5.5X58IN STRL LF

## (undated) DEVICE — TOWEL,OR,DSP,ST,BLUE,DLX,8/PK,10PK/CS: Brand: MEDLINE

## (undated) DEVICE — TRENDELENBURG WINGPAD POSITIONING KIT DELUXE - WITHOUT BODY STRAP: Brand: SOULE MEDICAL

## (undated) DEVICE — ADHS LIQ MASTISOL 2/3ML

## (undated) DEVICE — ELECTRODE,RT,STRESS,FOAM,50PK: Brand: MEDLINE

## (undated) DEVICE — SUT VICRYL 3-0 SH-1 PO 18IN J772D

## (undated) DEVICE — GW PERIPH GUIDERIGHT STD/EXCHNG/J/TIP SS 0.035IN 5X260CM

## (undated) DEVICE — KT INTRO MINISTICK MAX W/GW NITNL/TUNG ECHO 4F 21G 7CM

## (undated) DEVICE — BALN PTCA TAKERU RX NC 2.25X15MM

## (undated) DEVICE — GLV SURG SIGNATURE ESSENTIAL PF LTX SZ7

## (undated) DEVICE — NET RESCUENET F/B RETRV

## (undated) DEVICE — TRAP SXN POLYP QUICKCATCH LF

## (undated) DEVICE — ENSEAL X1 TISSUE SEALER, CURVED JAW, 37 CM SHAFT LENGTH: Brand: ENSEAL

## (undated) DEVICE — BLUNT TIP TROCAR: Brand: AUTO SUTURE

## (undated) DEVICE — GLV SURG SIGNATURE ESSENTIAL PF LTX SZ6.5

## (undated) DEVICE — GLV SURG SIGNATURE ESSENTIAL PF LTX SZ7.5

## (undated) DEVICE — DEV INFL MONARCH 20ML

## (undated) DEVICE — GLV SURG SENSICARE PI ORTHO PF SZ7 LF STRL

## (undated) DEVICE — SUT MONOCRYL 4/0 PS2 27IN Y426H ETY426H

## (undated) DEVICE — CANN SMPL SOFTECH BIFLO ETCO2 A/M 7FT

## (undated) DEVICE — SYR LUERLOK 20CC BX/50

## (undated) DEVICE — PK CATH LAB 60

## (undated) DEVICE — SUT VIC 2/0 SH 27IN

## (undated) DEVICE — RUNTHROUGH NS EXTRA FLOPPY PTCA GUIDEWIRE: Brand: RUNTHROUGH

## (undated) DEVICE — CATH GUIDE LAUNCHER EBU3.5 6F 100CM

## (undated) DEVICE — ENDOPATH XCEL DILATING TIP TROCARS WITH STABILITY SLEEVES: Brand: ENDOPATH XCEL

## (undated) DEVICE — SOL IRR NACL 0.9PCT BT 1000ML

## (undated) DEVICE — COPILOT KIT INCLUDES BLEEDBACK CONTROL VALVE / GUIDE WIRE INTRODUCER / TORQUE DEVICE: Brand: ACCESSORIES

## (undated) DEVICE — WOUND RETRACTOR AND PROTECTOR: Brand: ALEXIS O WOUND PROTECTOR-RETRACTOR

## (undated) DEVICE — SUT PDS CLOSURE CT1 1/0 27IN Z341H

## (undated) DEVICE — Device: Brand: DISPOSABLE ELECTROSURGICAL SNARE

## (undated) DEVICE — TOTAL TRAY, 16FR 10ML SIL FOLEY, URN: Brand: MEDLINE

## (undated) DEVICE — TR BAND RADIAL ARTERY COMPRESSION DEVICE: Brand: TR BAND

## (undated) DEVICE — ADHS SKIN PREMIERPRO EXOFIN TOPICAL HI/VISC .5ML

## (undated) DEVICE — PENCL ES MEGADINE EZ/CLEAN BUTN W/HOLSTR 10FT

## (undated) DEVICE — TRAY,IRRIGATION,BULBSYRINGE,60ML,CSR,PVP: Brand: MEDLINE

## (undated) DEVICE — PK LAP CHOLE LF 60

## (undated) DEVICE — APPL CHLORAPREP HI/LITE 26ML ORNG

## (undated) DEVICE — NDL HYPO PRECISIONGLIDE/REG 18G 1IN PNK

## (undated) DEVICE — A2000 MULTI-USE SYRINGE KIT, P/N 701277-003KIT CONTENTS: 100ML CONTRAST RESERVOIR AND TUBING WITH CONTRAST SPIKE AND CLAMP: Brand: A2000 MULTI-USE SYRINGE KIT

## (undated) DEVICE — Device: Brand: ASAHI SION BLUE

## (undated) DEVICE — CATH DIAG EXPO M/ PK 6FR FL4/FR4 PIG 3PK

## (undated) DEVICE — MODEL AT P65, P/N 701554-001KIT CONTENTS: HAND CONTROLLER, 3-WAY HIGH-PRESSURE STOPCOCK WITH ROTATING END AND PREMIUM HIGH-PRESSURE TUBING: Brand: ANGIOTOUCH® KIT

## (undated) DEVICE — GLIDESHEATH SLENDER STAINLESS STEEL KIT: Brand: GLIDESHEATH SLENDER

## (undated) DEVICE — RADIFOCUS OPTITORQUE ANGIOGRAPHIC CATHETER: Brand: OPTITORQUE

## (undated) DEVICE — SINGLE-USE BIOPSY FORCEPS: Brand: RADIAL JAW 4

## (undated) DEVICE — PREVENA PEEL & PLACE SYSTEM KIT- 13 CM: Brand: PREVENA™ PEEL & PLACE™

## (undated) DEVICE — TREK CORONARY DILATATION CATHETER 3.0 MM X 12 MM / RAPID-EXCHANGE: Brand: TREK

## (undated) DEVICE — SPNG LAP 18X18IN LF STRL PK/5

## (undated) DEVICE — LAPAROVUE VISIBILITY SYSTEM LAPAROSCOPIC SOLUTIONS: Brand: LAPAROVUE

## (undated) DEVICE — INTRO SHEATH ART/FEM ENGAGE .038 6F12CM